# Patient Record
Sex: MALE | Race: WHITE | NOT HISPANIC OR LATINO | Employment: FULL TIME | URBAN - METROPOLITAN AREA
[De-identification: names, ages, dates, MRNs, and addresses within clinical notes are randomized per-mention and may not be internally consistent; named-entity substitution may affect disease eponyms.]

---

## 2018-05-08 ENCOUNTER — TRANSFERRED RECORDS (OUTPATIENT)
Dept: HEALTH INFORMATION MANAGEMENT | Facility: CLINIC | Age: 38
End: 2018-05-08

## 2018-05-10 ENCOUNTER — TRANSFERRED RECORDS (OUTPATIENT)
Dept: HEALTH INFORMATION MANAGEMENT | Facility: CLINIC | Age: 38
End: 2018-05-10

## 2018-10-18 ENCOUNTER — TRANSFERRED RECORDS (OUTPATIENT)
Dept: HEALTH INFORMATION MANAGEMENT | Facility: CLINIC | Age: 38
End: 2018-10-18

## 2019-01-24 ENCOUNTER — TRANSFERRED RECORDS (OUTPATIENT)
Dept: HEALTH INFORMATION MANAGEMENT | Facility: CLINIC | Age: 39
End: 2019-01-24

## 2019-06-19 ENCOUNTER — OFFICE VISIT (OUTPATIENT)
Dept: URGENT CARE | Facility: URGENT CARE | Age: 39
End: 2019-06-19
Payer: COMMERCIAL

## 2019-06-19 VITALS
RESPIRATION RATE: 17 BRPM | OXYGEN SATURATION: 97 % | HEART RATE: 104 BPM | BODY MASS INDEX: 26.06 KG/M2 | WEIGHT: 166 LBS | HEIGHT: 67 IN | SYSTOLIC BLOOD PRESSURE: 127 MMHG | DIASTOLIC BLOOD PRESSURE: 90 MMHG | TEMPERATURE: 98.9 F

## 2019-06-19 DIAGNOSIS — J06.9 VIRAL URI: Primary | ICD-10-CM

## 2019-06-19 DIAGNOSIS — J02.9 SORE THROAT: ICD-10-CM

## 2019-06-19 LAB
DEPRECATED S PYO AG THROAT QL EIA: NORMAL
SPECIMEN SOURCE: NORMAL

## 2019-06-19 PROCEDURE — 87880 STREP A ASSAY W/OPTIC: CPT | Performed by: PHYSICIAN ASSISTANT

## 2019-06-19 PROCEDURE — 99213 OFFICE O/P EST LOW 20 MIN: CPT | Performed by: PHYSICIAN ASSISTANT

## 2019-06-19 PROCEDURE — 87081 CULTURE SCREEN ONLY: CPT | Performed by: PHYSICIAN ASSISTANT

## 2019-06-19 ASSESSMENT — MIFFLIN-ST. JEOR: SCORE: 1631.72

## 2019-06-20 LAB
BACTERIA SPEC CULT: NORMAL
SPECIMEN SOURCE: NORMAL

## 2019-06-20 NOTE — PATIENT INSTRUCTIONS
Patient Education     Self-Care for Sore Throats    Sore throats happen for many reasons, such as colds, allergies, and infections caused by viruses or bacteria. In any case, your throat becomes red and sore. Your goal for self-care is to reduce your discomfort while giving your throat a chance to heal.  Moisten and soothe your throat  Tips include the following:    Try a sip of water first thing after waking up.    Keep your throat moist by drinking 6 or more glasses of clear liquids every day.    Run a cool-air humidifier in your room overnight.    Avoid cigarette smoke.     Suck on throat lozenges, cough drops, hard candy, ice chips, or frozen fruit-juice bars. Use the sugar-free versions if your diet or medical condition requires them.  Gargle to ease irritation  Gargling every hour or 2 can ease irritation. Try gargling with 1 of these solutions:    1/4 teaspoon of salt in 1/2 cup of warm water    An over-the-counter anesthetic gargle  Use medicine for more relief  Over-the-counter medicine can reduce sore throat symptoms. Ask your pharmacist if you have questions about which medicine to use:    Ease pain with anesthetic sprays. Aspirin or an aspirin substitute also helps. Remember, never give aspirin to anyone 18 or younger, or if you are already taking blood thinners.     For sore throats caused by allergies, try antihistamines to block the allergic reaction.    Remember: unless a sore throat is caused by a bacterial infection, antibiotics won t help you.  Prevent future sore throats  Prevention tips include the following:    Stop smoking or reduce contact with secondhand smoke. Smoke irritates the tender throat lining.    Limit contact with pets and with allergy-causing substances, such as pollen and mold.    When you re around someone with a sore throat or cold, wash your hands often to keep viruses or bacteria from spreading.    Don t strain your vocal cords.  Call your healthcare provider  Contact your  healthcare provider if you have:    A temperature over 101 F (38.3 C)    White spots on the throat    Great difficulty swallowing    Trouble breathing    A skin rash    Recent exposure to someone else with strep bacteria    Severe hoarseness and swollen glands in the neck or jaw   Date Last Reviewed: 8/1/2016 2000-2018 [employer]. [address] [place], [place] [id number]. All rights reserved. This information is not intended as a substitute for professional medical care. Always follow your healthcare professional's instructions.

## 2019-06-20 NOTE — PROGRESS NOTES
"Subjective     Alfredo Brewer is a 39 year old male who presents to clinic today for the following health issues:    HPI     Sore throat for 3 days. Staying the same. Painful to talk. Not drooling, swallowing well. No difficulty opening or closing his mouth. No fever. Mild body ache yesterday. Feels that the ears are full. He is getting more nasal drainage.          Review of Systems   ROS COMP: Constitutional, HEENT, cardiovascular, pulmonary, gi and gu systems are negative, except as otherwise noted.      Objective    /90   Pulse 104   Temp 98.9  F (37.2  C) (Oral)   Resp 17   Ht 1.71 m (5' 7.32\")   Wt 75.3 kg (166 lb)   SpO2 97%   BMI 25.75 kg/m    Body mass index is 25.75 kg/m .  Physical Exam   GENERAL: healthy, alert and no distress  HENT: normal cephalic/atraumatic, both ears: clear effusion, nose and mouth without ulcers or lesions, oropharynx clear, oral mucous membranes moist and tonsillar erythema  NECK: no adenopathy, no asymmetry, masses, or scars and thyroid normal to palpation  RESP: lungs clear to auscultation - no rales, rhonchi or wheezes  CV: regular rate and rhythm, normal S1 S2, no S3 or S4, no murmur, click or rub, no peripheral edema and peripheral pulses strong  MS: no gross musculoskeletal defects noted, no edema  SKIN: no suspicious lesions or rashes  NEURO: Normal strength and tone, mentation intact and speech normal    Diagnostic Test Results:  Results for orders placed or performed in visit on 06/19/19 (from the past 24 hour(s))   Strep, Rapid Screen   Result Value Ref Range    Specimen Description Throat     Rapid Strep A Screen       NEGATIVE: No Group A streptococcal antigen detected by immunoassay, await culture report.           Assessment & Plan     Alfredo was seen today for urgent care.    Diagnoses and all orders for this visit:    Viral URI    Sore throat  -     Strep, Rapid Screen  -     Beta strep group A culture    Home supportive care with warm saline gargle, " Tylenol/ibuprofen for pain as recommended dose without exceeding the recommended dose.  Mucinex for nasal congestion.  May use Flonase nasal spray once daily for nasal congestion.    I have discussed the patient's diagnosis and my plan of treatment with the patient. We went over any labs or imaging. Patient is aware to come back in with worsening symptoms or if no relief despite treatment plan.  Patient verbalizes understanding. All questions were addressed and answered.     Return in about 1 week (around 6/26/2019).    Moon Trimble PA-C  [facility]

## 2019-07-08 DIAGNOSIS — Z31.41 FERTILITY TESTING: Primary | ICD-10-CM

## 2019-07-12 DIAGNOSIS — Z31.41 FERTILITY TESTING: ICD-10-CM

## 2019-07-12 LAB
ABNORMAL SPERM: 97 MORPHOLOGY
ABSTINENCE DAYS: 5 DAYS (ref 2–7)
AGGLUTINATION: NO YES/NO
ANALYSIS TEMP - CENTIGRADE: 23.5 CENTIGRADE
CELL FRAGMENTS: ABNORMAL %
COLLECTION METHOD: ABNORMAL
COLLECTION SITE: ABNORMAL
CONSENT TO RELEASE TO PARTNER: YES
HEAD DEFECT: 97
IMMATURE SPERM: ABNORMAL %
IMMOTILE: 36 %
LAB RECEIPT TIME: ABNORMAL
LIQUEFIED: YES YES/NO
MIDPIECE DEFECT: 51
NON-PROGRESSIVE MOTILITY: 9 %
NORMAL SPERM: 3 % NORMAL FORMS (ref 4–?)
PROGRESSIVE MOTILITY: 55 % (ref 32–?)
ROUND CELLS: 0.4 MILLION/ML (ref ?–2)
SPECIMEN CONCENTRATION: 26 MILLION/ML (ref 15–?)
SPECIMEN PH: 8 PH (ref 7.2–?)
SPECIMEN TYPE: ABNORMAL
SPECIMEN VOL UR: 2.2 ML (ref 1.5–?)
TAIL DEFECT: 18
TIME OF ANALYSIS: ABNORMAL
TOTAL NUMBER: 57 MILLION (ref 39–?)
TOTAL PROGRESSIVE MOTILE: 31 MILLION (ref 15.6–?)
VISCOUS: NO YES/NO
VITALITY: ABNORMAL % (ref 58–?)
WBC SPECIMEN: ABNORMAL %

## 2019-07-12 PROCEDURE — 89322 SEMEN ANAL STRICT CRITERIA: CPT

## 2019-09-29 ENCOUNTER — HEALTH MAINTENANCE LETTER (OUTPATIENT)
Age: 39
End: 2019-09-29

## 2021-01-14 ENCOUNTER — HEALTH MAINTENANCE LETTER (OUTPATIENT)
Age: 41
End: 2021-01-14

## 2021-01-28 ENCOUNTER — TRANSFERRED RECORDS (OUTPATIENT)
Dept: HEALTH INFORMATION MANAGEMENT | Facility: CLINIC | Age: 41
End: 2021-01-28

## 2021-03-30 ENCOUNTER — TRANSCRIBE ORDERS (OUTPATIENT)
Dept: OTHER | Age: 41
End: 2021-03-30

## 2021-03-30 DIAGNOSIS — C92.00 ACUTE MYELOID LEUKEMIA NOT HAVING ACHIEVED REMISSION (H): Primary | ICD-10-CM

## 2021-04-06 ENCOUNTER — DOCUMENTATION ONLY (OUTPATIENT)
Dept: ONCOLOGY | Facility: CLINIC | Age: 41
End: 2021-04-06

## 2021-04-06 NOTE — PROGRESS NOTES
Action    Action Taken 4/6/2021 10:34AM   I called pt Alfredo - his records at [facility] are from the 1980s. Alfredo is going to work on signing an BELEN for his historical records. I sent an BELEN to his email: [email or web address]    4/8/2021 11:32AM   I called pt Alfredo to follow up on his BELEN - he is working on the BELEN right now.     I called the [facility] Phone: [phone]    Release of Info  Ph: [phone]  Fax: [phone]    4/14/2021 8:14AM   I called the [facility] to follow up on the fax requests. I left a detailed vm requesting a call back.     I also faxed over a second urgent request along with the BELEN Alfredo signed.     4/14/2021 10:24AM   I called [facility] again - they are waiting on the pt's paper chart. I asked them to fax over his recent records and they will do that soon.     4/15/2021 3:02pm   I called [facility] again - They have two different individuals named Alfredo in their system and the records are mixed together. They are trying to sort through the records to make sure they are sending us the correct info.     9:19AM  I checked in with nurse Marita- she got some records from a nurse at [facility].    I called [facility] again - they are having system issues and still have to sort through the records. They are aware of the request for records and are trying to send them over soon.

## 2021-04-14 ENCOUNTER — TRANSFERRED RECORDS (OUTPATIENT)
Dept: HEALTH INFORMATION MANAGEMENT | Facility: CLINIC | Age: 41
End: 2021-04-14

## 2021-05-07 ENCOUNTER — TELEPHONE (OUTPATIENT)
Dept: OTOLARYNGOLOGY | Facility: CLINIC | Age: 41
End: 2021-05-07

## 2021-05-07 ENCOUNTER — VIRTUAL VISIT (OUTPATIENT)
Dept: ONCOLOGY | Facility: CLINIC | Age: 41
End: 2021-05-07
Attending: PEDIATRICS
Payer: COMMERCIAL

## 2021-05-07 DIAGNOSIS — Z86.39 HISTORY OF HYPERLIPIDEMIA: ICD-10-CM

## 2021-05-07 DIAGNOSIS — T66.XXXS RADIATION INJURY OF BRAIN, SEQUELA: ICD-10-CM

## 2021-05-07 DIAGNOSIS — G62.0 DRUG-INDUCED POLYNEUROPATHY (H): ICD-10-CM

## 2021-05-07 DIAGNOSIS — G43.109 MIGRAINE WITH TYPICAL AURA: Primary | ICD-10-CM

## 2021-05-07 DIAGNOSIS — Z92.21 STATUS POST CHEMOTHERAPY: ICD-10-CM

## 2021-05-07 DIAGNOSIS — Z85.6 HISTORY OF MYELOID LEUKEMIA: ICD-10-CM

## 2021-05-07 DIAGNOSIS — E78.5 HYPERLIPIDEMIA, UNSPECIFIED HYPERLIPIDEMIA TYPE: ICD-10-CM

## 2021-05-07 DIAGNOSIS — Z92.3 HISTORY OF RADIATION TO HEAD AND NECK REGION: ICD-10-CM

## 2021-05-07 DIAGNOSIS — C92.01 AML (ACUTE MYELOID LEUKEMIA) IN REMISSION (H): ICD-10-CM

## 2021-05-07 DIAGNOSIS — R42 VERTIGO: ICD-10-CM

## 2021-05-07 DIAGNOSIS — Z86.79 HISTORY OF CARDIAC DISORDER: ICD-10-CM

## 2021-05-07 DIAGNOSIS — Z85.828 HISTORY OF BASAL CELL CARCINOMA: ICD-10-CM

## 2021-05-07 PROBLEM — R29.898 TMJ CLICK: Status: ACTIVE | Noted: 2021-05-07

## 2021-05-07 PROBLEM — R32 URINARY INCONTINENCE: Status: ACTIVE | Noted: 2021-05-07

## 2021-05-07 PROBLEM — Z98.890 H/O BASAL CELL CARCINOMA EXCISION: Status: ACTIVE | Noted: 2018-08-25

## 2021-05-07 PROCEDURE — 99205 OFFICE O/P NEW HI 60 MIN: CPT | Mod: GT | Performed by: PEDIATRICS

## 2021-05-07 PROCEDURE — 99417 PROLNG OP E/M EACH 15 MIN: CPT | Mod: GT | Performed by: PEDIATRICS

## 2021-05-07 PROCEDURE — 999N001193 HC VIDEO/TELEPHONE VISIT; NO CHARGE

## 2021-05-07 RX ORDER — ROSUVASTATIN CALCIUM 20 MG/1
20 TABLET, COATED ORAL
COMMUNITY
Start: 2020-05-22 | End: 2022-05-20

## 2021-05-07 NOTE — LETTER
"  5/7/2021      RE: Alfredo Brewer  [id number] [facility] [address]       Dear Dr. Purcell,    Below is a copy of my most recent visit note with Alfredo in our childhood Cancer Survivor Program (cCSP).  His Survivor Care Plan (SCP) is included below. Please let us know if there is anything that we can do to help and thank you for collaborating with us in his survivorship care.    Arti Pimentel MD, MPH, MSE  Director, Cancer Survivor Program  Pediatric Hematology/Oncology  [facility]    Childhood Cancer Survivor Program (cCS) Progress Note  Pediatric Oncology     Alfredo is a 41 year old who is being evaluated via a billable video visit.      How would you like to obtain your AVS? MyChart     If the video visit is dropped, the invitation should be resent by: Send to e-mail at: [email or web address]     Will anyone else be joining your video visit? No          Vitals - Patient Reported  Weight (Patient Reported): 76.7 kg (169 lb)  Height (Patient Reported): 171.5 cm (5' 7.5\")  BMI (Based on Pt Reported Ht/Wt): 26.08  Pain Score: No Pain (0)    Tai Sanford LPN    Video Start Time: 7:15  End Time: 8:05am    Video-Visit Details    Type of service:  Video Visit    Video End Time:8:59 AM    Originating Location (pt. Location): Home    Distant Location (provider location):  [facility]     Platform used for Video Visit: [email or web address]     Pediatric Hematology/Oncology Progress Note    Alfredo Brewer is a 41 year old male referred to establish care in our childhood Cancer Survivor Program (cCSP)      Review of systems: A complete and comprehensive review of systems was performed and was negative other than what is listed above in the HPI.    PMHx: Appendicitis (2006), Herniated cervical disc (C5/C6), Degenerative disc (C6/C7), Chronic pain, Eczema.  SurgHx: Appendectomy (12/11/2006) and Endoscopy with biopsy (12/26/2013)  FamHx: non-contributory "   SocialHx: works full time,  and interested in starting a family. More to be detailed in separate documentation by [facility] Senthil Bush.    Current Outpatient Medications   Medication     AMITRIPTYLINE HCL PO     meclizine (ANTIVERT) 25 MG tablet     Omeprazole (PRILOSEC PO)     rosuvastatin (CRESTOR) 20 MG tablet     No current facility-administered medications for this visit.        Physical Exam:   GENERAL: Healthy, alert and no distress  EYES: Eyes grossly normal to inspection.  No discharge or erythema, or obvious scleral/conjunctival abnormalities.  HENT: Normal cephalic/atraumatic.  External ears, nose and mouth without ulcers or lesions.  No nasal drainage visible.  NECK: No asymmetry, visible masses or scars  RESP: No audible wheeze, cough, or visible cyanosis.  No visible retractions or increased work of breathing.    CV: no peripheral edema  MS: No gross musculoskeletal defects noted.  Normal range of motion.  No visible edema.  SKIN: Visible skin clear. No significant rash, abnormal pigmentation or lesions.  NEURO: Cranial nerves grossly intact.  Mentation and speech appropriate for age.  PSYCH: Mentation appears normal, affect normal/bright, judgement and insight intact, normal speech and appearance well-groomed.    Labs: none    SURVIVOR CARE PLAN  Treatment History  Diagnosis: Acute Myeloid Leukemia (AML)  Date of Diagnosis: 8/28/80  Date Therapy Completed: 3/2/1983  Treatment:  1) Chemo: Doxorubicin (500mg/m2), Cyclophosphamide (4.7g/m2), 5-Azacytidine, Methotrexate, Carmustine (BCNU), 6-Mercaptopurine, 6-Thioguanine, Vincristine and Cytarabine.  2) Radiation: Whole Brain (1800 cGy, initiated on 1980)  3) Surgery: Skin and testicular biopsy (1980), testicular biopsy (3/2/1983)  4) Bone Marrow Transplant: None  5) Immunotherapy: None  Known Late-Effects  1. Urinary Incontinence (2007)  2. Chronic Pelvic Pain (2007)  3. Migraine Headaches  4. Vertigo  5. Heart Issues - now all  better/resolved (left ventricular dysfunction)  6. Hypertension (high blood pressure)  7. Hyperlipidemia (high cholesterol)  8. Basal Cell Carcinoma (head)  9. Pre cancerous skin lesions (multiple on/and around the head/scalp and R upper anterior shoulder)   New Late-Effects  1. None  Surveillance Plan  1. Second Cancers: There was a risk for second cancers of the blood such as AML or MDS due to chemo drugs such as Cyclophosphamide and Doxorubicin.  However, this risk was highest in the first 5-10 years after the exposure thus are no longer a concern.  In addition, close attention will be paid to this during our yearly history and physical for any concerning signs or symptoms (such as unexplained fevers, bleeding or bruising, extreme fatigue or enlarged glands/swollen lymph nodes). There is a risk of second cancers to the skin, soft tissues and bones within the field of prior radiation.  Thus any new skin findings in the field of radiation should be reported to your dermatologist immediately and any new and unexplained lumps or bumps in the field of radiation should be brought to the attention of a medical provider immediately. Close attention to sun protection is also strongly recommended (so please continue your regular follow-up with dermatology yearly). Any sudden or dramatic change in vision, hearing, taste or smell should be reported immediately to a health care provider who may evaluate for a second cancer of the brain.  This includes unexplained or persistently worsening of your headaches as well as any neurological change.  Awareness of the risk of thyroid cancer is also important in case the whole brain radiation scattered and effected your thyroid which is in your neck.    2. Liver, Kidney and Bladder Health: This risk is low but a possibility after 6-mercaptopurine exposure.  This will be evaluated with baseline labs (hepatic panel) in 2021.  If normal, these lab tests will only be needed in the future  on an as needed basis if you develop symptoms or signs of liver dysfunction. Due to the exposure to chemotherapy drugs that can affect the kidney and bladder, it will be necessary to obtain [facility] kidney health lab (BUN/Cr) at entry into the [facility] but thereafter, if normal, needs to be only sent on an as needed basis.  3. Heart Health: As a result of your exposure to a chemotherapy class of drugs called anthracyclines (such as Doxorubicin) it will be necessary to obtain echos every 2 years. These can be done on the same day as routine appointments with us in the cCSP. This was last done in 2013.  If you notice any new, changed or severe chest pain, shortness of breath or unexplained swelling, please notify a medical provider immediately. Encouraged regular physical activity and discussed the importance of maintaining a healthy weight/BMI and avoiding unhealthy lifestyle choices, such as smoking or excessive alcohol consumption.  4. Bone Health: The exposure to chemotherapy as a child (particularly methotrexate) results in a risk for long-term poor bone health such as decreased bone mineral density. This will need to be assessed with a baseline DEXA scan in the future which then can be repeated only on an as needed basis. We will check a yearly Vitamin D level as well.  5. Hormonal Health: As a result of the exposure to brain radiation and chemo such as cycophosphamide, there is a risk for hormonal abnormalities.  These will be screened for during our yearly cCSP visit with a thorough history and physical. In addition, yearly thyroid function tests should be ordered as well.  6. Mental Health: Many childhood cancer survivors are at risk for neurocognitive deficits as a result of the disruption to their early development for their necessary oncology treatments.  If any changes are noted regarding neurocognitive function (memory issues, information processing, comprehension, etc) or psychological health please let us  and your primary care provider know immediately.  7. Dental Health: As a result of the radiation and chemo exposure, there is risk for increased cavities.  Therefore, it is critical to have routine preventative dental care every 6 months.    8. Eye Health: As a result of the radiation to the brain, there is risk for scatter to the eyes leading to eye late-effects such as cataracts and vision changes.  Therefore, it is critical to have eyes examined yearly by an eye specialist who is aware of the radiation history.  9. Metabolic Health: There is increasing evidence in the childhood cancer survivor literature to suggest that survivors are at increased risk for things related to metabolic health such as obesity, high cholesterol, hormonal imbalance, high BP, poor renal function, etc.  Thus, a lipid panel for cholesterol and triglyceride screening should take place every 1-2 years and if any abnormality is found requiring intervention, we would recommend that the intervention occur immediately and aggressively.  10. Vaccine Health: Please confirm with your PCP that your vaccines are up to date, including HPV. You can now receive any vaccine that he/she recommends, there are no restrictions.  11. Hearing Health: As a result of brain radiation that could scatter to the inner ear, there is a risk for further hearing loss.   Therefore, it is critical to have your hearing checked if you notice a change.      Assessment and Plan:  42 yo M w/ h/o AML treated with chemo and whole brain radiation doing well with stable late -effects that need updated evaluations and treatments    PLAN:  1) CBC as per # 1 above - ordered as future lab to be done locally through lab-only visit  2) CMP as per #2 above - ordered as future lab to be done locally through lab-only visit  3) Echo as per #3 above - ordered as future test to be done locally  4) Lipid panel (cholesterol) and HgA1c (blood sugar) as per # 9 above - ordered as future lab to  be done locally through lab-only visit  5) TSH and free T4 as per # 5 above - ordered as future lab to be done locally through lab-only visit  6) Vit D level as per #4 above - ordered as future lab to be done locally through lab-only visit  7) ENT and PT (balance/vesitublar group) referrals for vertigo  8) Neurology referral for updated evaluation and treatment of migraines and input on how frequently he needs brain MRIs and thoughts on peripheral neuropathy evaluation and treatment  9) PM&R - Cancer Rehab referral with Dr. 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      Total time spent on the following services on the date of the encounter:  Preparing to see patient, chart review, review of outside records, Ordering medications, test, procedures, chemotherapy, Referring or communicating with other healthcare professionals, Performing a medically appropriate examination , Counseling and educating the patient/family/caregiver , Documenting clinical information in the electronic or other health record , Communicating results to the patient/family/caregiver , Care coordination  and Total time spent: 110 mins          Arti Pimentel MD

## 2021-05-07 NOTE — PROGRESS NOTES
"Alfredo is a 41 year old who is being evaluated via a billable video visit.      How would you like to obtain your AVS? MyChart     If the video visit is dropped, the invitation should be resent by: Send to e-mail at: [email or web address]     Will anyone else be joining your video visit? No          Vitals - Patient Reported  Weight (Patient Reported): 76.7 kg (169 lb)  Height (Patient Reported): 171.5 cm (5' 7.5\")  BMI (Based on Pt Reported Ht/Wt): 26.08  Pain Score: No Pain (0)    Tai Roman Reid LPN    Video Start Time: 7:15  End Time: 8:05am    Video-Visit Details    Type of service:  Video Visit    Video End Time:8:59 AM    Originating Location (pt. Location): Home    Distant Location (provider location):  [facility] CANCER [facility]     Platform used for Video Visit: LiveQoS     Pediatric Hematology/Oncology Progress Note    Alfredo Brewer is a 41 year old male referred to establish care in our childhood Cancer Survivor Program (cCSP)      Review of systems: A complete and comprehensive review of systems was performed and was negative other than what is listed above in the HPI.    PMHx: Appendicitis (2006), Herniated cervical disc (C5/C6), Degenerative disc (C6/C7), Chronic pain, Eczema.  SurgHx: Appendectomy (12/11/2006) and Endoscopy with biopsy (12/26/2013)  FamHx: non-contributory   SocialHx: works full time,  and interested in starting a family. More to be detailed in separate documentation by cCSP Senthil Bush.    Current Outpatient Medications   Medication     AMITRIPTYLINE HCL PO     meclizine (ANTIVERT) 25 MG tablet     Omeprazole (PRILOSEC PO)     rosuvastatin (CRESTOR) 20 MG tablet     No current facility-administered medications for this visit.        Physical Exam:   GENERAL: Healthy, alert and no distress  EYES: Eyes grossly normal to inspection.  No discharge or erythema, or obvious scleral/conjunctival abnormalities.  HENT: Normal cephalic/atraumatic.  External ears, nose " and mouth without ulcers or lesions.  No nasal drainage visible.  NECK: No asymmetry, visible masses or scars  RESP: No audible wheeze, cough, or visible cyanosis.  No visible retractions or increased work of breathing.    CV: no peripheral edema  MS: No gross musculoskeletal defects noted.  Normal range of motion.  No visible edema.  SKIN: Visible skin clear. No significant rash, abnormal pigmentation or lesions.  NEURO: Cranial nerves grossly intact.  Mentation and speech appropriate for age.  PSYCH: Mentation appears normal, affect normal/bright, judgement and insight intact, normal speech and appearance well-groomed.    Labs: none    SURVIVOR CARE PLAN  Treatment History  Diagnosis: Acute Myeloid Leukemia (AML)  Date of Diagnosis: 8/28/80  Date Therapy Completed: 3/2/1983  Treatment:  1) Chemo: Doxorubicin (500mg/m2), Cyclophosphamide (4.7g/m2), 5-Azacytidine, Methotrexate, Carmustine (BCNU), 6-Mercaptopurine, 6-Thioguanine, Vincristine and Cytarabine.  2) Radiation: Whole Brain (1800 cGy, initiated on 1980)  3) Surgery: Skin and testicular biopsy (1980), testicular biopsy (3/2/1983)  4) Bone Marrow Transplant: None  5) Immunotherapy: None  Known Late-Effects  1. Urinary Incontinence (2007)  2. Chronic Pelvic Pain (2007)  3. Migraine Headaches  4. Vertigo  5. Heart Issues - now all better/resolved (left ventricular dysfunction)  6. Hypertension (high blood pressure)  7. Hyperlipidemia (high cholesterol)  8. Basal Cell Carcinoma (head)  9. Pre cancerous skin lesions (multiple on/and around the head/scalp and R upper anterior shoulder)   New Late-Effects  1. None  Surveillance Plan  1. Second Cancers: There was a risk for second cancers of the blood such as AML or MDS due to chemo drugs such as Cyclophosphamide and Doxorubicin.  However, this risk was highest in the first 5-10 years after the exposure thus are no longer a concern.  In addition, close attention will be paid to this during our yearly  history and physical for any concerning signs or symptoms (such as unexplained fevers, bleeding or bruising, extreme fatigue or enlarged glands/swollen lymph nodes). There is a risk of second cancers to the skin, soft tissues and bones within the field of prior radiation.  Thus any new skin findings in the field of radiation should be reported to your dermatologist immediately and any new and unexplained lumps or bumps in the field of radiation should be brought to the attention of a medical provider immediately. Close attention to sun protection is also strongly recommended (so please continue your regular follow-up with dermatology yearly). Any sudden or dramatic change in vision, hearing, taste or smell should be reported immediately to a health care provider who may evaluate for a second cancer of the brain.  This includes unexplained or persistently worsening of your headaches as well as any neurological change.  Awareness of the risk of thyroid cancer is also important in case the whole brain radiation scattered and effected your thyroid which is in your neck.    2. Liver, Kidney and Bladder Health: This risk is low but a possibility after 6-mercaptopurine exposure.  This will be evaluated with baseline labs (hepatic panel) in 2021.  If normal, these lab tests will only be needed in the future on an as needed basis if you develop symptoms or signs of liver dysfunction. Due to the exposure to chemotherapy drugs that can affect the kidney and bladder, it will be necessary to obtain [facility] kidney health lab (BUN/Cr) at entry into the [facility] but thereafter, if normal, needs to be only sent on an as needed basis.  3. Heart Health: As a result of your exposure to a chemotherapy class of drugs called anthracyclines (such as Doxorubicin) it will be necessary to obtain echos every 2 years. These can be done on the same day as routine appointments with us in the [facility]. This was last done in 2013.  If you notice any new,  changed or severe chest pain, shortness of breath or unexplained swelling, please notify a medical provider immediately. Encouraged regular physical activity and discussed the importance of maintaining a healthy weight/BMI and avoiding unhealthy lifestyle choices, such as smoking or excessive alcohol consumption.  4. Bone Health: The exposure to chemotherapy as a child (particularly methotrexate) results in a risk for long-term poor bone health such as decreased bone mineral density. This will need to be assessed with a baseline DEXA scan in the future which then can be repeated only on an as needed basis. We will check a yearly Vitamin D level as well.  5. Hormonal Health: As a result of the exposure to brain radiation and chemo such as cycophosphamide, there is a risk for hormonal abnormalities.  These will be screened for during our yearly cCSP visit with a thorough history and physical. In addition, yearly thyroid function tests should be ordered as well.  6. Mental Health: Many childhood cancer survivors are at risk for neurocognitive deficits as a result of the disruption to their early development for their necessary oncology treatments.  If any changes are noted regarding neurocognitive function (memory issues, information processing, comprehension, etc) or psychological health please let us and your primary care provider know immediately.  7. Dental Health: As a result of the radiation and chemo exposure, there is risk for increased cavities.  Therefore, it is critical to have routine preventative dental care every 6 months.    8. Eye Health: As a result of the radiation to the brain, there is risk for scatter to the eyes leading to eye late-effects such as cataracts and vision changes.  Therefore, it is critical to have eyes examined yearly by an eye specialist who is aware of the radiation history.  9. Metabolic Health: There is increasing evidence in the childhood cancer survivor literature to suggest  that survivors are at increased risk for things related to metabolic health such as obesity, high cholesterol, hormonal imbalance, high BP, poor renal function, etc.  Thus, a lipid panel for cholesterol and triglyceride screening should take place every 1-2 years and if any abnormality is found requiring intervention, we would recommend that the intervention occur immediately and aggressively.  10. Vaccine Health: Please confirm with your PCP that your vaccines are up to date, including HPV. You can now receive any vaccine that he/she recommends, there are no restrictions.  11. Hearing Health: As a result of brain radiation that could scatter to the inner ear, there is a risk for further hearing loss.   Therefore, it is critical to have your hearing checked if you notice a change.      Assessment and Plan:  42 yo M w/ h/o AML treated with chemo and whole brain radiation doing well with stable late -effects that need updated evaluations and treatments    PLAN:  1) CBC as per # 1 above - ordered as future lab to be done locally through lab-only visit  2) CMP as per #2 above - ordered as future lab to be done locally through lab-only visit  3) Echo as per #3 above - ordered as future test to be done locally  4) Lipid panel (cholesterol) and HgA1c (blood sugar) as per # 9 above - ordered as future lab to be done locally through lab-only visit  5) TSH and free T4 as per # 5 above - ordered as future lab to be done locally through lab-only visit  6) Vit D level as per #4 above - ordered as future lab to be done locally through lab-only visit  7) ENT and PT (balance/vesitublar group) referrals for vertigo  8) Neurology referral for updated evaluation and treatment of migraines and input on how frequently he needs brain MRIs and thoughts on peripheral neuropathy evaluation and treatment  9) PM&R - Cancer Rehab referral with Dr. 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    Participation in the childhood Cancer Survivor Program database (IRB study # [id number] - Late Effects in Survivors of Cancer, Stem Cell Transplantation, or Blood Disorders) was reviewed with the participant and all risks and benefits were explained. I answered all questions and reviewed all information as listed on our written consent document. Participant is happy to consent and has no further questions.      Total time spent on the following services on the date of the encounter:  Preparing to see patient, chart review, review of outside records, Ordering medications, test, procedures, chemotherapy, Referring or communicating with other healthcare professionals, Performing a medically appropriate examination , Counseling and educating the patient/family/caregiver , Documenting clinical information in the electronic or other health record , Communicating results to the patient/family/caregiver , Care coordination  and Total time spent: 110 mins

## 2021-05-07 NOTE — TELEPHONE ENCOUNTER
[facility] Call Center    Phone Message    May a detailed message be left on voicemail: no     Reason for Call: Appointment Intake    Referring Provider Name: Arti Pimentel MD in  [facility] LONG TERM F/U  Diagnosis and/or Symptoms:   Migraine with typical aura [G43.109]  History of myeloid leukemia [Z85.6]  Hyperlipidemia, unspecified hyperlipidemia type [E78.5]  AML (acute myeloid leukemia) in remission (H) [C92.01]  Status post chemotherapy [Z92.21]  History of radiation to head and neck region [Z92.3]  Drug-induced polyneuropathy (H) [G62.0]  History of cardiac disorder [Z86.79]  History of basal cell carcinoma [Z85.828]  History of hyperlipidemia [Z86.39]  Vertigo [R42]  Radiation injury of brain, 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

## 2021-05-07 NOTE — LETTER
5/7/2021      RE: Alfredo Brewer  [id number] [address] [id number]     SURVIVOR CARE PLAN  Treatment History  Diagnosis: Acute Myeloid Leukemia (AML)  Date of Diagnosis: 8/28/80  Date Therapy Completed: 3/2/1983  Treatment:  1) Chemo: Doxorubicin (500mg/m2), Cyclophosphamide (4.7g/m2), 5-Azacytidine, Methotrexate, Carmustine (BCNU), 6-Mercaptopurine, 6-Thioguanine, Vincristine and Cytarabine.  2) Radiation: Whole Brain (1800 cGy, initiated on 1980)  3) Surgery: Skin and testicular biopsy (1980), testicular biopsy (3/2/1983)  4) Bone Marrow Transplant: None  5) Immunotherapy: None  Known Late-Effects  1. Urinary Incontinence (2007)  2. Chronic Pelvic Pain (2007)  3. Migraine Headaches  4. Vertigo  5. Heart Issues - now all better/resolved (left ventricular dysfunction)  6. Hypertension (high blood pressure)  7. Hyperlipidemia (high cholesterol)  8. Basal Cell Carcinoma (head)  9. Pre cancerous skin lesions (multiple on/and around the head/scalp and R upper anterior shoulder)   New Late-Effects  1. None  Surveillance Plan  1. Second Cancers: There was a risk for second cancers of the blood such as AML or MDS due to chemo drugs such as Cyclophosphamide and Doxorubicin.  However, this risk was highest in the first 5-10 years after the exposure thus are no longer a concern.  In addition, close attention will be paid to this during our yearly history and physical for any concerning signs or symptoms (such as unexplained fevers, bleeding or bruising, extreme fatigue or enlarged glands/swollen lymph nodes). There is a risk of second cancers to the skin, soft tissues and bones within the field of prior radiation.  Thus any new skin findings in the field of radiation should be reported to your dermatologist immediately and any new and unexplained lumps or bumps in the field of radiation should be brought to the attention of a medical provider immediately. Close attention to sun protection is also strongly  recommended (so please continue your regular follow-up with dermatology yearly). Any sudden or dramatic change in vision, hearing, taste or smell should be reported immediately to a health care provider who may evaluate for a second cancer of the brain.  This includes unexplained or persistently worsening of your headaches as well as any neurological change.  Awareness of the risk of thyroid cancer is also important in case the whole brain radiation scattered and effected your thyroid which is in your neck.    2. Liver, Kidney and Bladder Health: This risk is low but a possibility after 6-mercaptopurine exposure.  This will be evaluated with baseline labs (hepatic panel) in 2021.  If normal, these lab tests will only be needed in the future on an as needed basis if you develop symptoms or signs of liver dysfunction. Due to the exposure to chemotherapy drugs that can affect the kidney and bladder, it will be necessary to obtain [facility] kidney health lab (BUN/Cr) at entry into the [facility] but thereafter, if normal, needs to be only sent on an as needed basis.  3. Heart Health: As a result of your exposure to a chemotherapy class of drugs called anthracyclines (such as Doxorubicin) it will be necessary to obtain echos every 2 years. These can be done on the same day as routine appointments with us in the [facility]. This was last done in 2013.  If you notice any new, changed or severe chest pain, shortness of breath or unexplained swelling, please notify a medical provider immediately. Encouraged regular physical activity and discussed the importance of maintaining a healthy weight/BMI and avoiding unhealthy lifestyle choices, such as smoking or excessive alcohol consumption.  4. Bone Health: The exposure to chemotherapy as a child (particularly methotrexate) results in a risk for long-term poor bone health such as decreased bone mineral density. This will need to be assessed with a baseline DEXA scan in the future which then  can be repeated only on an as needed basis. We will check a yearly Vitamin D level as well.  5. Hormonal Health: As a result of the exposure to brain radiation and chemo such as cycophosphamide, there is a risk for hormonal abnormalities.  These will be screened for during our yearly cCSP visit with a thorough history and physical. In addition, yearly thyroid function tests should be ordered as well.  6. Mental Health: Many childhood cancer survivors are at risk for neurocognitive deficits as a result of the disruption to their early development for their necessary oncology treatments.  If any changes are noted regarding neurocognitive function (memory issues, information processing, comprehension, etc) or psychological health please let us and your primary care provider know immediately.  7. Dental Health: As a result of the radiation and chemo exposure, there is risk for increased cavities.  Therefore, it is critical to have routine preventative dental care every 6 months.    8. Eye Health: As a result of the radiation to the brain, there is risk for scatter to the eyes leading to eye late-effects such as cataracts and vision changes.  Therefore, it is critical to have eyes examined yearly by an eye specialist who is aware of the radiation history.  9. Metabolic Health: There is increasing evidence in the childhood cancer survivor literature to suggest that survivors are at increased risk for things related to metabolic health such as obesity, high cholesterol, hormonal imbalance, high BP, poor renal function, etc.  Thus, a lipid panel for cholesterol and triglyceride screening should take place every 1-2 years and if any abnormality is found requiring intervention, we would recommend that the intervention occur immediately and aggressively.  10. Vaccine Health: Please confirm with your PCP that your vaccines are up to date, including HPV. You can now receive any vaccine that he/she recommends, there are no  restrictions.  11. Hearing Health: As a result of brain radiation that could scatter to the inner ear, there is a risk for further hearing loss.   Therefore, it is critical to have your hearing checked if you notice a change.    PLAN:  1) CBC as per # 1 above - ordered as future lab to be done locally through lab-only visit  2) CMP as per #2 above - ordered as future lab to be done locally through lab-only visit  3) Echo as per #3 above - ordered as future test to be done locally  4) Lipid panel (cholesterol) and HgA1c (blood sugar) as per # 9 above - ordered as future lab to be done locally through lab-only visit  5) TSH and free T4 as per # 5 above - ordered as future lab to be done locally through lab-only visit  6) Vit D level as per #4 above - ordered as future lab to be done locally through lab-only visit  7) ENT and PT (balance/vesitublar group) referrals for vertigo  8) Neurology referral for updated evaluation and treatment of migraines and input on how frequently he needs brain MRIs and thoughts on peripheral neuropathy evaluation and treatment  9) PM&R - Cancer Rehab referral with Dr. 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

## 2021-05-07 NOTE — LETTER
"5/7/2021      RE: Alfredo Brewer  [address] [facility] [address]       Alfredo is a 41 year old who is being evaluated via a billable video visit.      How would you like to obtain your AVS? MyChart     If the video visit is dropped, the invitation should be resent by: Send to e-mail at: [email or web address]     Will anyone else be joining your video visit? No          Vitals - Patient Reported  Weight (Patient Reported): 76.7 kg (169 lb)  Height (Patient Reported): 171.5 cm (5' 7.5\")  BMI (Based on Pt Reported Ht/Wt): 26.08  Pain Score: No Pain (0)    Tai Sanford LPN    Video Start Time: 7:15  End Time: 8:05am    Video-Visit Details    Type of service:  Video Visit    Video End Time:8:59 AM    Originating Location (pt. Location): Home    Distant Location (provider location):  [facility]     Platform used for Video Visit: [facility]     Pediatric Hematology/Oncology Progress Note    Alfredo Brewer is a 41 year old male referred to establish care in our childhood Cancer Survivor Program (cCSP)      Review of systems: A complete and comprehensive review of systems was performed and was negative other than what is listed above in the HPI.    PMHx: Appendicitis (2006), Herniated cervical disc (C5/C6), Degenerative disc (C6/C7), Chronic pain, Eczema.  SurgHx: Appendectomy (12/11/2006) and Endoscopy with biopsy (12/26/2013)  FamHx: non-contributory   SocialHx: works full time,  and interested in starting a family. More to be detailed in separate documentation by cCSP Senthil Bush.    Current Outpatient Medications   Medication     AMITRIPTYLINE HCL PO     meclizine (ANTIVERT) 25 MG tablet     Omeprazole (PRILOSEC PO)     rosuvastatin (CRESTOR) 20 MG tablet     No current facility-administered medications for this visit.        Physical Exam:   GENERAL: Healthy, alert and no distress  EYES: Eyes grossly normal to inspection.  No discharge or erythema, or obvious " scleral/conjunctival abnormalities.  HENT: Normal cephalic/atraumatic.  External ears, nose and mouth without ulcers or lesions.  No nasal drainage visible.  NECK: No asymmetry, visible masses or scars  RESP: No audible wheeze, cough, or visible cyanosis.  No visible retractions or increased work of breathing.    CV: no peripheral edema  MS: No gross musculoskeletal defects noted.  Normal range of motion.  No visible edema.  SKIN: Visible skin clear. No significant rash, abnormal pigmentation or lesions.  NEURO: Cranial nerves grossly intact.  Mentation and speech appropriate for age.  PSYCH: Mentation appears normal, affect normal/bright, judgement and insight intact, normal speech and appearance well-groomed.    Labs: none    SURVIVOR CARE PLAN  Treatment History  Diagnosis: Acute Myeloid Leukemia (AML)  Date of Diagnosis: [birth year]/28[birth year]  Date Therapy Completed: 3/2/1983  Treatment:  1) Chemo: Doxorubicin (500mg/m2), Cyclophosphamide (4.7g/m2), 5-Azacytidine, Methotrexate, Carmustine (BCNU), 6-Mercaptopurine, 6-Thioguanine, Vincristine and Cytarabine.  2) Radiation: Whole Brain (1800 cGy, initiated on 1980)  3) Surgery: Skin and testicular biopsy (1980), testicular biopsy (3/2/1983)  4) Bone Marrow Transplant: None  5) Immunotherapy: None  Known Late-Effects  1. Urinary Incontinence (2007)  2. Chronic Pelvic Pain (2007)  3. Migraine Headaches  4. Vertigo  5. Heart Issues - now all better/resolved (left ventricular dysfunction)  6. Hypertension (high blood pressure)  7. Hyperlipidemia (high cholesterol)  8. Basal Cell Carcinoma (head)  9. Pre cancerous skin lesions (multiple on/and around the head/scalp and R upper anterior shoulder)   New Late-Effects  1. None  Surveillance Plan  1. Second Cancers: There was a risk for second cancers of the blood such as AML or MDS due to chemo drugs such as Cyclophosphamide and Doxorubicin.  However, this risk was highest in the first 5-10 years after the exposure thus are no  longer a concern.  In addition, close attention will be paid to this during our yearly history and physical for any concerning signs or symptoms (such as unexplained fevers, bleeding or bruising, extreme fatigue or enlarged glands/swollen lymph nodes). There is a risk of second cancers to the skin, soft tissues and bones within the field of prior radiation.  Thus any new skin findings in the field of radiation should be reported to your dermatologist immediately and any new and unexplained lumps or bumps in the field of radiation should be brought to the attention of a medical provider immediately. Close attention to sun protection is also strongly recommended (so please continue your regular follow-up with dermatology yearly). Any sudden or dramatic change in vision, hearing, taste or smell should be reported immediately to a health care provider who may evaluate for a second cancer of the brain.  This includes unexplained or persistently worsening of your headaches as well as any neurological change.  Awareness of the risk of thyroid cancer is also important in case the whole brain radiation scattered and effected your thyroid which is in your neck.    2. Liver, Kidney and Bladder Health: This risk is low but a possibility after 6-mercaptopurine exposure.  This will be evaluated with baseline labs (hepatic panel) in 2021.  If normal, these lab tests will only be needed in the future on an as needed basis if you develop symptoms or signs of liver dysfunction. Due to the exposure to chemotherapy drugs that can affect the kidney and bladder, it will be necessary to obtain [facility] kidney health lab (BUN/Cr) at entry into the [facility] but thereafter, if normal, needs to be only sent on an as needed basis.  3. Heart Health: As a result of your exposure to a chemotherapy class of drugs called anthracyclines (such as Doxorubicin) it will be necessary to obtain echos every 2 years. These can be done on the same day as routine  appointments with us in the cCSP. This was last done in 2013.  If you notice any new, changed or severe chest pain, shortness of breath or unexplained swelling, please notify a medical provider immediately. Encouraged regular physical activity and discussed the importance of maintaining a healthy weight/BMI and avoiding unhealthy lifestyle choices, such as smoking or excessive alcohol consumption.  4. Bone Health: The exposure to chemotherapy as a child (particularly methotrexate) results in a risk for long-term poor bone health such as decreased bone mineral density. This will need to be assessed with a baseline DEXA scan in the future which then can be repeated only on an as needed basis. We will check a yearly Vitamin D level as well.  5. Hormonal Health: As a result of the exposure to brain radiation and chemo such as cycophosphamide, there is a risk for hormonal abnormalities.  These will be screened for during our yearly cCSP visit with a thorough history and physical. In addition, yearly thyroid function tests should be ordered as well.  6. Mental Health: Many childhood cancer survivors are at risk for neurocognitive deficits as a result of the disruption to their early development for their necessary oncology treatments.  If any changes are noted regarding neurocognitive function (memory issues, information processing, comprehension, etc) or psychological health please let us and your primary care provider know immediately.  7. Dental Health: As a result of the radiation and chemo exposure, there is risk for increased cavities.  Therefore, it is critical to have routine preventative dental care every 6 months.    8. Eye Health: As a result of the radiation to the brain, there is risk for scatter to the eyes leading to eye late-effects such as cataracts and vision changes.  Therefore, it is critical to have eyes examined yearly by an eye specialist who is aware of the radiation history.  9. Metabolic Health:  There is increasing evidence in the childhood cancer survivor literature to suggest that survivors are at increased risk for things related to metabolic health such as obesity, high cholesterol, hormonal imbalance, high BP, poor renal function, etc.  Thus, a lipid panel for cholesterol and triglyceride screening should take place every 1-2 years and if any abnormality is found requiring intervention, we would recommend that the intervention occur immediately and aggressively.  10. Vaccine Health: Please confirm with your PCP that your vaccines are up to date, including HPV. You can now receive any vaccine that he/she recommends, there are no restrictions.  11. Hearing Health: As a result of brain radiation that could scatter to the inner ear, there is a risk for further hearing loss.   Therefore, it is critical to have your hearing checked if you notice a change.      Assessment and Plan:  42 yo M w/ h/o AML treated with chemo and whole brain radiation doing well with stable late -effects that need updated evaluations and treatments    PLAN:  1) CBC as per # 1 above - ordered as future lab to be done locally through lab-only visit  2) CMP as per #2 above - ordered as future lab to be done locally through lab-only visit  3) Echo as per #3 above - ordered as future test to be done locally  4) Lipid panel (cholesterol) and HgA1c (blood sugar) as per # 9 above - ordered as future lab to be done locally through lab-only visit  5) TSH and free T4 as per # 5 above - ordered as future lab to be done locally through lab-only visit  6) Vit D level as per #4 above - ordered as future lab to be done locally through lab-only visit  7) ENT and PT (balance/vesitublar group) referrals for vertigo  8) Neurology referral for updated evaluation and treatment of migraines and input on how frequently he needs brain MRIs and thoughts on peripheral neuropathy evaluation and treatment  9) PM&R - Cancer Rehab referral with Dr. Lackey for  caner-related peripheral neuropathy evaluation and treatment  10) Psychosocial recs per our cCSP  Senthil Oliva  11) return to see me in one year and in the meantime, as your test results come back in I'll send you a Sangart message with the results, what they mean and next steps.      Total time spent on the following services on the date of the encounter:  Preparing to see patient, chart review, review of outside records, Ordering medications, test, procedures, chemotherapy, Referring or communicating with other healthcare professionals, Performing a medically appropriate examination , Counseling and educating the patient/family/caregiver , Documenting clinical information in the electronic or other health record , Communicating results to the patient/family/caregiver , Care coordination  and Total time spent: 110 mins    Arti Pimentel MD

## 2021-05-10 ENCOUNTER — PATIENT OUTREACH (OUTPATIENT)
Dept: CARE COORDINATION | Facility: CLINIC | Age: 41
End: 2021-05-10

## 2021-05-10 NOTE — PROGRESS NOTES
Social Work Intervention  [facility]    Data/Intervention:    Patient Name:  Alfredo Brewer  /Age:  1980 (41 year old)    Visit Type: telephone  Referral Source: Dr. Pimentel   Reason for Referral:  Check in/ Brief Psychosocial     Collaborated With:    -pt    Intervention/Education/Resources Provided:  SW was asked to contact pt and complete a brief psychosocial assessment. SW called pt and introduced self, and explained the reason for calling. Pt asked if they can contact SW sometime tomorrow. SW provided pt with contact information and will wait for them to call.    Assessment/Plan:  SW will remain available as needed.  Provided patient/family with contact information and availability.    SERGE Titus,SADIE  Hematology/Oncology Social Worker  Phone:[phone] Pager: [phone]

## 2021-05-11 ENCOUNTER — TELEPHONE (OUTPATIENT)
Dept: OTOLARYNGOLOGY | Facility: CLINIC | Age: 41
End: 2021-05-11

## 2021-05-25 ENCOUNTER — HOSPITAL ENCOUNTER (OUTPATIENT)
Dept: PHYSICAL THERAPY | Facility: CLINIC | Age: 41
Setting detail: THERAPIES SERIES
End: 2021-05-25
Attending: PEDIATRICS
Payer: COMMERCIAL

## 2021-05-25 DIAGNOSIS — R42 VERTIGO: ICD-10-CM

## 2021-05-25 DIAGNOSIS — Z86.79 HISTORY OF CARDIAC DISORDER: ICD-10-CM

## 2021-05-25 DIAGNOSIS — Z92.21 STATUS POST CHEMOTHERAPY: ICD-10-CM

## 2021-05-25 DIAGNOSIS — Z85.6 HISTORY OF MYELOID LEUKEMIA: ICD-10-CM

## 2021-05-25 DIAGNOSIS — G43.109 MIGRAINE WITH TYPICAL AURA: ICD-10-CM

## 2021-05-25 DIAGNOSIS — Z86.39 HISTORY OF HYPERLIPIDEMIA: ICD-10-CM

## 2021-05-25 DIAGNOSIS — C92.01 AML (ACUTE MYELOID LEUKEMIA) IN REMISSION (H): ICD-10-CM

## 2021-05-25 DIAGNOSIS — E78.5 HYPERLIPIDEMIA, UNSPECIFIED HYPERLIPIDEMIA TYPE: ICD-10-CM

## 2021-05-25 DIAGNOSIS — T66.XXXS RADIATION INJURY OF BRAIN, SEQUELA: ICD-10-CM

## 2021-05-25 DIAGNOSIS — Z85.828 HISTORY OF BASAL CELL CARCINOMA: ICD-10-CM

## 2021-05-25 DIAGNOSIS — Z92.3 HISTORY OF RADIATION TO HEAD AND NECK REGION: ICD-10-CM

## 2021-05-25 DIAGNOSIS — G62.0 DRUG-INDUCED POLYNEUROPATHY (H): ICD-10-CM

## 2021-05-25 PROCEDURE — 97161 PT EVAL LOW COMPLEX 20 MIN: CPT | Mod: GP | Performed by: PHYSICAL THERAPIST

## 2021-05-25 PROCEDURE — 97112 NEUROMUSCULAR REEDUCATION: CPT | Mod: GP | Performed by: PHYSICAL THERAPIST

## 2021-05-25 NOTE — TELEPHONE ENCOUNTER
RECORDS STATUS - ALL OTHER DIAGNOSIS      RECORDS RECEIVED FROM: [facility]   DATE RECEIVED: 6/8/2021   NOTES STATUS DETAILS   OFFICE NOTE from referring provider Arti Thomas MD   OFFICE NOTE from medical oncologist Complete 5/7/2021 Virtual Visit- Migraine with typical aura (Primary Dx)     DISCHARGE SUMMARY from hospital N/A    DISCHARGE REPORT from the ER     OPERATIVE REPORT N/A    MEDICATION LIST Complete [facility]   CLINICAL TRIAL TREATMENTS TO DATE     LABS     PATHOLOGY REPORTS N/A    ANYTHING RELATED TO DIAGNOSIS Complete Labs last updated on 5/7/2021   GENONOMIC TESTING     TYPE:     IMAGING (NEED IMAGES & REPORT)     CT SCANS     MRI     MAMMO     ULTRASOUND     PET

## 2021-06-03 DIAGNOSIS — E78.5 HYPERLIPIDEMIA, UNSPECIFIED HYPERLIPIDEMIA TYPE: ICD-10-CM

## 2021-06-03 DIAGNOSIS — C92.01 AML (ACUTE MYELOID LEUKEMIA) IN REMISSION (H): ICD-10-CM

## 2021-06-03 DIAGNOSIS — G62.0 DRUG-INDUCED POLYNEUROPATHY (H): ICD-10-CM

## 2021-06-03 DIAGNOSIS — G43.109 MIGRAINE WITH TYPICAL AURA: ICD-10-CM

## 2021-06-03 DIAGNOSIS — Z86.39 HISTORY OF HYPERLIPIDEMIA: ICD-10-CM

## 2021-06-03 DIAGNOSIS — Z92.21 STATUS POST CHEMOTHERAPY: ICD-10-CM

## 2021-06-03 DIAGNOSIS — T66.XXXS RADIATION INJURY OF BRAIN, SEQUELA: ICD-10-CM

## 2021-06-03 DIAGNOSIS — R42 VERTIGO: ICD-10-CM

## 2021-06-03 DIAGNOSIS — Z86.79 HISTORY OF CARDIAC DISORDER: ICD-10-CM

## 2021-06-03 DIAGNOSIS — Z92.3 HISTORY OF RADIATION TO HEAD AND NECK REGION: ICD-10-CM

## 2021-06-03 DIAGNOSIS — Z85.6 HISTORY OF MYELOID LEUKEMIA: ICD-10-CM

## 2021-06-03 DIAGNOSIS — Z85.828 HISTORY OF BASAL CELL CARCINOMA: ICD-10-CM

## 2021-06-03 LAB
ALBUMIN SERPL-MCNC: 4.1 G/DL (ref 3.4–5)
ALP SERPL-CCNC: 71 U/L (ref 40–150)
ALT SERPL W P-5'-P-CCNC: 34 U/L (ref 0–70)
ANION GAP SERPL CALCULATED.3IONS-SCNC: 3 MMOL/L (ref 3–14)
AST SERPL W P-5'-P-CCNC: 23 U/L (ref 0–45)
BASOPHILS # BLD AUTO: 0.1 10E9/L (ref 0–0.2)
BASOPHILS NFR BLD AUTO: 0.9 %
BILIRUB SERPL-MCNC: 0.4 MG/DL (ref 0.2–1.3)
BUN SERPL-MCNC: 21 MG/DL (ref 7–30)
CALCIUM SERPL-MCNC: 9.1 MG/DL (ref 8.5–10.1)
CHLORIDE SERPL-SCNC: 104 MMOL/L (ref 94–109)
CHOLEST SERPL-MCNC: 187 MG/DL
CO2 SERPL-SCNC: 28 MMOL/L (ref 20–32)
CREAT SERPL-MCNC: 1.12 MG/DL (ref 0.66–1.25)
DEPRECATED CALCIDIOL+CALCIFEROL SERPL-MC: 33 UG/L (ref 20–75)
DIFFERENTIAL METHOD BLD: ABNORMAL
EOSINOPHIL # BLD AUTO: 0.3 10E9/L (ref 0–0.7)
EOSINOPHIL NFR BLD AUTO: 6.4 %
ERYTHROCYTE [DISTWIDTH] IN BLOOD BY AUTOMATED COUNT: 12.3 % (ref 10–15)
GFR SERPL CREATININE-BSD FRML MDRD: 81 ML/MIN/{1.73_M2}
GLUCOSE SERPL-MCNC: 91 MG/DL (ref 70–99)
HBA1C MFR BLD: 5.1 % (ref 0–5.6)
HCT VFR BLD AUTO: 39 % (ref 40–53)
HDLC SERPL-MCNC: 52 MG/DL
HGB BLD-MCNC: 13 G/DL (ref 13.3–17.7)
LDLC SERPL CALC-MCNC: 78 MG/DL
LYMPHOCYTES # BLD AUTO: 2.1 10E9/L (ref 0.8–5.3)
LYMPHOCYTES NFR BLD AUTO: 38.6 %
MCH RBC QN AUTO: 30.1 PG (ref 26.5–33)
MCHC RBC AUTO-ENTMCNC: 33.3 G/DL (ref 31.5–36.5)
MCV RBC AUTO: 90 FL (ref 78–100)
MONOCYTES # BLD AUTO: 0.6 10E9/L (ref 0–1.3)
MONOCYTES NFR BLD AUTO: 11.3 %
NEUTROPHILS # BLD AUTO: 2.3 10E9/L (ref 1.6–8.3)
NEUTROPHILS NFR BLD AUTO: 42.8 %
NONHDLC SERPL-MCNC: 135 MG/DL
PLATELET # BLD AUTO: 260 10E9/L (ref 150–450)
POTASSIUM SERPL-SCNC: 4.1 MMOL/L (ref 3.4–5.3)
PROT SERPL-MCNC: 7.9 G/DL (ref 6.8–8.8)
RBC # BLD AUTO: 4.32 10E12/L (ref 4.4–5.9)
SODIUM SERPL-SCNC: 135 MMOL/L (ref 133–144)
TRIGL SERPL-MCNC: 283 MG/DL
TSH SERPL DL<=0.005 MIU/L-ACNC: 1.93 MU/L (ref 0.4–4)
WBC # BLD AUTO: 5.3 10E9/L (ref 4–11)

## 2021-06-03 PROCEDURE — 80061 LIPID PANEL: CPT | Performed by: PEDIATRICS

## 2021-06-03 PROCEDURE — 36415 COLL VENOUS BLD VENIPUNCTURE: CPT | Performed by: PEDIATRICS

## 2021-06-03 PROCEDURE — 80050 GENERAL HEALTH PANEL: CPT | Performed by: PEDIATRICS

## 2021-06-03 PROCEDURE — 82306 VITAMIN D 25 HYDROXY: CPT | Performed by: PEDIATRICS

## 2021-06-03 PROCEDURE — 83036 HEMOGLOBIN GLYCOSYLATED A1C: CPT | Performed by: PEDIATRICS

## 2021-06-08 ENCOUNTER — PRE VISIT (OUTPATIENT)
Dept: ONCOLOGY | Facility: CLINIC | Age: 41
End: 2021-06-08

## 2021-06-08 ENCOUNTER — ONCOLOGY VISIT (OUTPATIENT)
Dept: ONCOLOGY | Facility: CLINIC | Age: 41
End: 2021-06-08
Attending: PEDIATRICS
Payer: COMMERCIAL

## 2021-06-08 VITALS
RESPIRATION RATE: 16 BRPM | BODY MASS INDEX: 26.07 KG/M2 | HEIGHT: 68 IN | SYSTOLIC BLOOD PRESSURE: 149 MMHG | WEIGHT: 172 LBS | HEART RATE: 101 BPM | DIASTOLIC BLOOD PRESSURE: 102 MMHG

## 2021-06-08 DIAGNOSIS — G62.0 DRUG-INDUCED POLYNEUROPATHY (H): ICD-10-CM

## 2021-06-08 DIAGNOSIS — C92.01 AML (ACUTE MYELOID LEUKEMIA) IN REMISSION (H): ICD-10-CM

## 2021-06-08 DIAGNOSIS — Z92.21 STATUS POST CHEMOTHERAPY: ICD-10-CM

## 2021-06-08 PROCEDURE — G0463 HOSPITAL OUTPT CLINIC VISIT: HCPCS

## 2021-06-08 PROCEDURE — 99205 OFFICE O/P NEW HI 60 MIN: CPT | Performed by: STUDENT IN AN ORGANIZED HEALTH CARE EDUCATION/TRAINING PROGRAM

## 2021-06-08 RX ORDER — LACTOBACILLUS RHAMNOSUS GG 10B CELL
1 CAPSULE ORAL DAILY
COMMUNITY

## 2021-06-08 ASSESSMENT — MIFFLIN-ST. JEOR: SCORE: 1651.75

## 2021-06-08 ASSESSMENT — PAIN SCALES - GENERAL: PAINLEVEL: NO PAIN (0)

## 2021-06-08 NOTE — LETTER
2021         RE: Alfredo Brewer  [address] [facility] [address]        Dear Colleague,    Thank you for referring your patient, Alfredo Brewer, to the [facility] [place]. Please see a copy of my visit note below.           PM&R Clinic Note     Patient Name: Alfredo Brewer : 1980 Medical Record: [id number]     Requesting Physician/clinician: Arti Pimentel MD           History of Present Illness:     Alfredo Brewer is a 41 year old male with history of AML (managed with chemotherapy and whole brain radiation and part of the Childhood Cancer Survivor Program) who presents to PM&R Clinic for evaluation and management of peripheral neuropathy symptoms.        Symptoms,  Patient was seen this morning in clinic, and complains of ongoing episodic peripheral neuropathy symptoms that have been present for several years.     He complains of burning, aching pain from his hip level bilaterally down to his ankles. He has these burning, aching episodes roughly once per month and they last a day or two when he has them. He has been taking amitriptyline for several years for management of migraines, and felt like at first this medication helped with his neuropathy symptoms, though with limited relief. He has also been prescribed gabapentin in the past, and takes this medication prn with good relief when he has symptoms.     He states that his neuropathy symptoms started around , and were originally started in his trunk radiating bilaterally to his knees. He has never had the burning, aching symptoms in his feet, and does complain of bilateral foot numbness, mainly over the plantar surface of his feet. He does not complain of ever having low back pain that radiates down his legs. And at the time his symptoms started, he was not on any chemotherapy regimen or maintenance medication regimen (chemotherapy was in ). He also denies having similar symptoms in childhood in the  when  he was receiving his chemotherapy.    Coincidentally, patient was diagnosed with a herniated disc at the C5-C6 level around 2007, and has undergone PT, chiropractic care, home traction and periodic epidural corticosteroid injections at [facility] for several years. His neck symptoms are still ongoing.     He denies bladder symptoms currently, however does endorse urinary incontinence from [id number] at the time he developed his neuropathic symptoms, and his providers at the time did not come to a definite diagnosis for his peripheral neuropathy symptoms or bladder incontinence. He reports having a few EMGs (one at [facility] and one with a provider in [place]) at that time which were negative. He also had a lumbar MRI done at that time which was negative for root or cord findings.    Patient complains of chronic constipation which is currently well managed with occasional senna and dietary modifications, and has had no other issues with bowel management.         Therapies/[facility],  Had PT for cervical herniated disc, has also had chiropractic care and does home traction for cervical pain.       Functionally,   Independent with mobilty, ADLs and IADLs             Past Medical and Surgical History:     No past medical history on file.  No past surgical history on file.         Social History:     Social History     Tobacco Use     Smoking status: Never Smoker     Smokeless tobacco: Never Used   Substance Use Topics     Alcohol use: Not on file       Living situation: Lives in [place], [place]  Vocational History: Works in insurance           Functional history:     Alfredo Brewer is independent with all aspects of his life.             Family History:     No family history on file.         Medications:     Current Outpatient Medications   Medication Sig Dispense Refill     AMITRIPTYLINE HCL PO Take by mouth At Bedtime       meclizine (ANTIVERT) 25 MG tablet Take 1 tablet (25 mg) by mouth every 6 hours as needed for dizziness  "30 tablet 1     Omeprazole (PRILOSEC PO) Take by mouth every morning       rosuvastatin (CRESTOR) 20 MG tablet Take 20 mg by mouth              Allergies:     Allergies   Allergen Reactions     Codeine      Nausea and vomiting     Erythromycin      Stomach pain     Prednisone      Stomach pain     Vicodin [Acetaminophen]      Stomach pain     Wheat Bran GI Disturbance              ROS:     A focused ROS is negative other than the symptoms noted above in the HPI.             Physical Examiniation:     VITAL SIGNS: There were no vitals taken for this visit.  BMI: Estimated body mass index is 25.75 kg/m  as calculated from the following:    Height as of 6/19/19: 1.71 m (5' 7.32\").    Weight as of 6/19/19: 75.3 kg (166 lb).    Gen: NAD, pleasant and cooperative   HEENT: Normocephalic, atraumatic, extraocular movements appear intact  Pulm: non-labored breathing in room air  Ext: no edema in BLE, no tenderness in calves  Neuro/MSK:   Orientation: Patient is oriented to person, place, time and situation. Exhibits good insight into his medical history and current treatments.  Motor: LE MMT- 4/5 with right hip flexion, 5/5 with right knee flexion/extension and ankle dorsiflexion, plantar flexion. 5/5 with left hip flexion, knee flexion/extension, ankle dorsiflexion/plantar flexion.  Sensory: Decreased sensation to light touch over L2, L3 dermatomes on right, otherwise sensation is intact bilaterally in all other dermatomes of bilateral lower extremities.  Reflexes: Patellar and Achilles are 2+ and symmetric bilaterally.  Back Exam: tenderness to palpation over left lower lumbar paraspinals, No tenderness to palpation over thoracic/lumbar spinous processes, bilateral SI joints or greater trochanters bilaterally. No exacerbation of pain with lumbar extension and lateral rotation or lumbar flexion.   Straight leg raise test negative bilaterally.            Laboratory/Imaging:     MR Cervical Spine (w/o Contrast) " 3/9/2019):  IMPRESSION:  1. There is degenerative change with right paracentral disc protrusion with annular tear slightly flattening the right hemicord at the C5-6 level. There may be some slight mass effect on the associated right nerve root.  2. At the C6-7 level there is probable moderate central spinal stenosis with potential moderately severe bilateral neural foraminal narrowing. Other degenerative changes are as described above.    MR Lumbar Spine (w/o Contrast 2/7/2009):  IMPRESSION:  1. No significant spinal canal or neural foraminal  stenosis.  2. Scattered foci of fatty marrow infiltration seen  throughout the lumbar spine and sacrum.         Assessment/Plan:   Alfredo Brewer is a 41 year old male with history of AML (managed with chemotherapy and whole brain radiation and part of the Childhood Cancer Survivor Program) who presents to PM&R Clinic for evaluation and management of peripheral neuropathy symptoms. As discussed with Alfredo, with his ongoing bilateral lower extremity neuropathic sensory symptoms and findings of asymmetric weakness with right hip flexion and decreased sensation in his right lower extremity on exam, it would be pertinent to further investigate for potential causes of his neuropathy. This will include imaging, labs and an EMG as he has not had any of these tests done in over a decade. Patient is in agreement with this plan. In regards to his symptoms, he is currently getting adequate relief with amitriptyline and gabapentin, and we also discussed non-medication modalities for treatment options with OT, though patient would like to wait on this with the number of appointments that he has.    1. Patient education: In depth discussion and education was provided about the assessment and implications of each of the below recommendations for management. Patient indicated readiness to learn, all questions were answered and understanding of material presented was  confirmed.  2. Work-up:  1. Labs ordered, further peripheral neuropathy work up- B6, B12, SPEP, folate, CK. TSH and HbA1c ordered by Dr. Pimentel are WNL.  2. Lumbar MRI ordered to further evaluate potential lumbar etiology for symptoms with lumbar pain, RLE weakness and RLE sensory findings on exam.  3. Repeat EMG ordered (last EMG was on 2007), with new weakness in RLE on exam and ongoing neuropathic symptoms.  3. Therapy/equipment/braces:  1. OT management modalities were discussed, however patient would like to hold off for now and we can re-explore these at a future visit if needed.  4. Medications:  1. Continue amitriptyline and gabapentin, as these have been giving adequate relief of symptoms.  5. Follow up: 2-3 months    Allyson Lackey MD  Physical Medicine & Rehabilitation    I appreciate the opportunity to participate in the care of your patient.                     Again, thank you for allowing me to participate in the care of your patient.        Sincerely,        Allyson Lackey MD

## 2021-06-08 NOTE — PATIENT INSTRUCTIONS
1. Labs have been ordered to further evaluate your peripheral neuropathy symptoms.  2. A Lumbar MRI has been ordered to further evaluate your ongoing symptoms and weakness on exam.  3. An repeat EMG has been ordered.  4. Continue amitriptyline and gabapentin for symptoms relief.  5. Follow up with Dr. Lackey in clinic in 2-3 months.

## 2021-06-08 NOTE — PROGRESS NOTES
PM&R Clinic Note     Patient Name: Alfredo Brewer : 1980 Medical Record: [id number]     Requesting Physician/clinician: Arti Pimentel MD           History of Present Illness:     Alfredo Brewer is a 41 year old male with history of AML (managed with chemotherapy and whole brain radiation and part of the Childhood Cancer Survivor Program) who presents to [facility] for evaluation and management of peripheral neuropathy symptoms.        Symptoms,  Patient was seen this morning in clinic, and complains of ongoing episodic peripheral neuropathy symptoms that have been present for several years.     He complains of burning, aching pain from his hip level bilaterally down to his ankles. He has these burning, aching episodes roughly once per month and they last a day or two when he has them. He has been taking amitriptyline for several years for management of migraines, and felt like at first this medication helped with his neuropathy symptoms, though with limited relief. He has also been prescribed gabapentin in the past, and takes this medication prn with good relief when he has symptoms.     He states that his neuropathy symptoms started around , and were originally started in his trunk radiating bilaterally to his knees. He has never had the burning, aching symptoms in his feet, and does complain of bilateral foot numbness, mainly over the plantar surface of his feet. He does not complain of ever having low back pain that radiates down his legs. And at the time his symptoms started, he was not on any chemotherapy regimen or maintenance medication regimen (chemotherapy was in ). He also denies having similar symptoms in childhood in the  when he was receiving his chemotherapy.    Coincidentally, patient was diagnosed with a herniated disc at the C5-C6 level around , and has undergone PT, chiropractic care, home traction and periodic epidural corticosteroid injections at [facility] for  several years. His neck symptoms are still ongoing.     He denies bladder symptoms currently, however does endorse urinary incontinence from [id number] at the time he developed his neuropathic symptoms, and his providers at the time did not come to a definite diagnosis for his peripheral neuropathy symptoms or bladder incontinence. He reports having a few EMGs (one at [facility] and one with a provider in [place]) at that time which were negative. He also had a lumbar MRI done at that time which was negative for root or cord findings.    Patient complains of chronic constipation which is currently well managed with occasional senna and dietary modifications, and has had no other issues with bowel management.         Therapies/HEP,  Had PT for cervical herniated disc, has also had chiropractic care and does home traction for cervical pain.       Functionally,   Independent with mobilty, ADLs and IADLs             Past Medical and Surgical History:     No past medical history on file.  No past surgical history on file.         Social History:     Social History     Tobacco Use     Smoking status: Never Smoker     Smokeless tobacco: Never Used   Substance Use Topics     Alcohol use: Not on file       Living situation: Lives in [place], [place]  Vocational History: Works in [employer]           Functional history:     Alfredo Brewer is independent with all aspects of his life.             Family History:     No family history on file.         Medications:     Current Outpatient Medications   Medication Sig Dispense Refill     AMITRIPTYLINE HCL PO Take by mouth At Bedtime       meclizine (ANTIVERT) 25 MG tablet Take 1 tablet (25 mg) by mouth every 6 hours as needed for dizziness 30 tablet 1     Omeprazole (PRILOSEC PO) Take by mouth every morning       rosuvastatin (CRESTOR) 20 MG tablet Take 20 mg by mouth              Allergies:     Allergies   Allergen Reactions     Codeine      Nausea and vomiting     Erythromycin       "Stomach pain     Prednisone      Stomach pain     Vicodin [Acetaminophen]      Stomach pain     Wheat Bran GI Disturbance              ROS:     A focused ROS is negative other than the symptoms noted above in the HPI.             Physical Examiniation:     VITAL SIGNS: There were no vitals taken for this visit.  BMI: Estimated body mass index is 25.75 kg/m  as calculated from the following:    Height as of 6/19/19: 1.71 m (5' 7.32\").    Weight as of 6/19/19: 75.3 kg (166 lb).    Gen: NAD, pleasant and cooperative   HEENT: Normocephalic, atraumatic, extraocular movements appear intact  Pulm: non-labored breathing in room air  Ext: no edema in BLE, no tenderness in calves  Neuro/MSK:   Orientation: Patient is oriented to person, place, time and situation. Exhibits good insight into his medical history and current treatments.  Motor: LE MMT- 4/5 with right hip flexion, 5/5 with right knee flexion/extension and ankle dorsiflexion, plantar flexion. 5/5 with left hip flexion, knee flexion/extension, ankle dorsiflexion/plantar flexion.  Sensory: Decreased sensation to light touch over L2, L3 dermatomes on right, otherwise sensation is intact bilaterally in all other dermatomes of bilateral lower extremities.  Reflexes: Patellar and Achilles are 2+ and symmetric bilaterally.  Back Exam: tenderness to palpation over left lower lumbar paraspinals, No tenderness to palpation over thoracic/lumbar spinous processes, bilateral SI joints or greater trochanters bilaterally. No exacerbation of pain with lumbar extension and lateral rotation or lumbar flexion.   Straight leg raise test negative bilaterally.            Laboratory/Imaging:     MR Cervical Spine (w/o Contrast) 3/9/2019):  IMPRESSION:  1. There is degenerative change with right paracentral disc protrusion with annular tear slightly flattening the right hemicord at the C5-6 level. There may be some slight mass effect on the associated right nerve root.  2. At the C6-7 " level there is probable moderate central spinal stenosis with potential moderately severe bilateral neural foraminal narrowing. Other degenerative changes are as described above.    MR Lumbar Spine (w/o Contrast 2/7/2009):  IMPRESSION:  1. No significant spinal canal or neural foraminal  stenosis.  2. Scattered foci of fatty marrow infiltration seen  throughout the lumbar spine and sacrum.         Assessment/Plan:   Alfredo Brewer is a 41 year old male with history of AML (managed with chemotherapy and whole brain radiation and part of the Childhood Cancer Survivor Program) who presents to [facility]R Clinic for evaluation and management of peripheral neuropathy symptoms. As discussed with Alfredo, with his ongoing bilateral lower extremity neuropathic sensory symptoms and findings of asymmetric weakness with right hip flexion and decreased sensation in his right lower extremity on exam, it would be pertinent to further investigate for potential causes of his neuropathy. This will include imaging, labs and an EMG as he has not had any of these tests done in over a decade. Patient is in agreement with this plan. In regards to his symptoms, he is currently getting adequate relief with amitriptyline and gabapentin, and we also discussed non-medication modalities for treatment options with OT, though patient would like to wait on this with the number of appointments that he has.    1. Patient education: In depth discussion and education was provided about the assessment and implications of each of the below recommendations for management. Patient indicated readiness to learn, all questions were answered and understanding of material presented was confirmed.  2. Work-up:  1. Labs ordered, further peripheral neuropathy work up- B6, B12, SPEP, folate, CK. TSH and HbA1c ordered by Dr. Pimentel are WNL.  2. Lumbar MRI ordered to further evaluate potential lumbar etiology for symptoms with lumbar pain, RLE weakness and RLE sensory findings  on exam.  3. Repeat EMG ordered (last EMG was on 2007), with new weakness in RLE on exam and ongoing neuropathic symptoms.  3. Therapy/equipment/braces:  1. OT management modalities were discussed, however patient would like to hold off for now and we can re-explore these at a future visit if needed.  4. Medications:  1. Continue amitriptyline and gabapentin, as these have been giving adequate relief of symptoms.  5. Follow up: 2-3 months    Allyson Lackey MD  Physical Medicine & Rehabilitation    I appreciate the opportunity to participate in the care of your patient.

## 2021-07-02 NOTE — TELEPHONE ENCOUNTER
1. Have you noticed any changes in hearing? Yes  2. Do you have ringing, buzzing, or other sounds in your ears or head, this is also referred to as Tinnitus? Yes  3. When and where was your last [facility] test? [facility] [address]   4. Do you feel lightheaded or foggy? Yes  5. Do you have a spinning sensation? Yes  6. Is there any specific position that can bring on dizziness? random  7. Does looking up cause dizziness? Yes  8. Does getting in and our of bed cause dizziness? Sometimes: sometimes   9. Does turning over in bed increase or cause dizziness? Sometimes: sometimes   10. Does bending over cause dizziness? Sometimes: sometimes   11. Is there anything that you can do to prevent the dizziness? Being more cautious   12. Has the dizziness gotten better with time? No  13. Have you seen Physical Therapy for dizziness? (Please indicate clinic and as much of the location as possible): Yes, If yes, where? [facility] , Also [facility] if yes, who?   14. Are you being referred to a specific physician? No  15. Have you been evaluated/treated for your dizziness at any other location?  (If yes,obtian as much clinic/provider/locaiton as possible) Yes. (If yes answer the following questions:)   Have you seen any ENT, Neurology, or other providers for these symptoms?             Yes, If yes, where? [facility], [place], [facility]   if yes, who?    Have you had any balance or Audiology testing? [facility] Have you had an MRI or CT scan of your head or neck? Yes, If yes, where? [facility] ([facility] or [facility])            [facility] if yes, who?     Would you like to receive your Release of Information by mail or e-mail?  e-mail

## 2021-07-05 NOTE — TELEPHONE ENCOUNTER
FUTURE VISIT INFORMATION      FUTURE VISIT INFORMATION:    Date: 9/21/2021    Time: 3:30PM    Location: [facility]  REFERRAL INFORMATION:    Referring provider:  Arti Pimentel MD    Referring providers clinic:  [facility]     Reason for visit/diagnosis  Vertigo- Referred by  Referred by Arti Pimentel MD in  [facility] LONG TERM F/U    RECORDS REQUESTED FROM:       Clinic name Comments Records Status Imaging Status   [facility] 5/25/2021 vestibular eval with Terrie Jiménez, PT [facility]    [facility] [place]  5/8/2018 audiogram  Emailed BELEN 7/5/21 - req 7/20/21 - received     [facility]  10/18/2018 audiogram   1/24/2019, 10/31/2018 PT note from Monie Hahn PT   11/26/2018, 11/19/2018 PT note from Gina De Santiago PT  11/15/2018 PT note from Kate Camarillo PT   11/8/2018 PT note from Calista Shaw PT  10/26/2018, 9/24/2018 note from Rosanne Jeffries PA    10/8/2018 audiology testing with Marlin Berg (no raw data)   9/25/2018 PT note from Marivel Khanna PT    Emailed BELEN 7/5/21 - req 7/20/21, 7/22/21 - received 7/27/21    [facility]  *checked with [facility], no head/brain imaging, most recent MRI was done in 2016 for cervical spine N/A N/A    [facility]  5/7/2021 note and referral from Arti Pimentel MD [facility] imaging  6/22/2019 MR Pituitary  [facility]  req 7/27/21 - PACS     7/5/2021 10:22AM emailed BELEN to patient [email or web address] - Amay   7/13/2021 4:01PM patient will try to get signed BELEN to me by the end of the week - Amay   7/20/2021 9:17AM received signed ROIs from patient, sent a fax for recs - Amay   *[facility] received, sent to scan, waiting for [facility] recs - Amay   7/22/2021 11:42AM sent a 2nd request to [facility] for recs - Amay   7/26/2021 9AM called and left [facility] med recs a voicemail with my direct number and fax on VM. Notified [facility] that we have not received recs from req sent on 7/20 and 7/22. If I do not hear from them today, I will call them back tomorrow - Amay    7/27/2021 9:48AM called [facility], spoke with Savannah from medical records to check on request status. Savannah will fax the records today - Amay   *10:44AM recs received from [facility], sent to scan. SEnt a fax to [facility] for images.  Delayed message sent to audioligy to review - Amay   8/10/2021 9:34AM images received from [facility] - Amay

## 2021-07-13 NOTE — TELEPHONE ENCOUNTER
Spoke with patient who confirmed that he received the ROIs via email on 7/5. Patient will work on getting those ROIs to me before the end of the week. Relayed to patient that I will be out for the rest of the week and will work on collecting his records next week once his signed ROIs have been received.     Genevieve   Clinic      7/20/2021 9:17AM update  Received signed BELEN from patient via email - Genevieve

## 2021-07-28 NOTE — TELEPHONE ENCOUNTER
Requesting orders for hearing test and VNG/rotary chair testing prior to ENT appointment with Dr. Nissen.      Per Chart Review: Patient referred from [facility]. Patient has a history of chemotherapy/radiation treatment for acute myeloid leukemia. Per recent PT note, dizziness seems to be correlated with migraines. Gets migraines about 2x/month, no room spinning, but bobble head. On PT exam, spontaneous right beating nystagmus was noted, and nystagmus during Rody-Hallpike testing which was not consistent with BPPV.     [facility] records: VNG testing completed in 2018 reported 60% right unilateral vestibular hypofunction.    Hearing test from 2018 showed normal hearing bilaterally.      Terri Mcadams.  Licensed Audiologist  [place] # [id number]

## 2021-07-29 DIAGNOSIS — R42 DIZZINESS: Primary | ICD-10-CM

## 2021-08-19 ENCOUNTER — TELEPHONE (OUTPATIENT)
Dept: AUDIOLOGY | Facility: CLINIC | Age: 41
End: 2021-08-19

## 2021-09-08 ENCOUNTER — TELEPHONE (OUTPATIENT)
Dept: AUDIOLOGY | Facility: CLINIC | Age: 41
End: 2021-09-08

## 2021-09-08 NOTE — TELEPHONE ENCOUNTER
"\"I m calling from the Audiology and Balance Testing department at the . This is just a call to remind you of your upcoming Balance Testing appointment on [Date], and to see if you have any questions or concerns regarding the balance testing you'll be doing. You should have received an itinerary via mail or via "[employer]", if you are active, that goes over what to expect and explains the dos and don ts both 48 hours before, and the day of. There is a list of medications for you to review on the itinerary that we would like you to stop taking beforehand. If you didn t receive the itinerary or you still have questions, please give our clinic a call at [phone]. Otherwise, we will see you on [Date] starting at [Time].\"    Please send encounter if patient would like to reschedule.  "

## 2021-09-15 NOTE — PROGRESS NOTES
"AUDIOLOGY REPORT-BALANCE ASSESSMENT    SUBJECTIVE: Alfredo Brewer, 41 year old, was seen in Audiology at the [facility] on 9/17/2021, for videonystagmography (VNG) and rotational chair testing referred by Rick Nissen, M.D. Patient is accompanied to today's visit by his wife. Patient presents with chief complaints of episodic vertigo, dizziness, imbalance and headache.    Patient reports his migraine headaches onset in 2007. In 2011/2012, he began to experience episodic vertigo, some blurred vision, dizziness and imbalance with his headaches. Patient describes his vertigo as the room spinning. Episodes typically lasts hours to days in duration. He reports his longest episode occurred in 2018 and lasted 2.5 days in duration. Between his episodes, patient reports occasional dizziness following cleaning his ears with a towel. He describes this brief dizziness as a \"bobble head\" feeling which can last from minutes to approximately 1/2-hour. During his episodes, patient reports lying on his left side and closing his eyes helps calm his system. Patient reports experiencing symptoms upon rolling over in bed a few times in the past but not frequently. He denies symptoms being provoked by busy visual environments (grocery store, shopping, watching TV/scrolling on phone), looking up or bending down. He reports no symptoms while driving.     Patient reports taking amitriptyline has helped prevent his migraines. He describes his migraine headaches as head pressure localized to his forehead. Over this past summer, he reports experiencing his episodes once a month. Patient reports some increased sensitivity to bright lights. He denies increased sensitivity to loud sounds. Patient denies dizziness with coughing, sneezing, blowing his nose, lifting heavy items and bearing down. He denies autophony (eye blinks). Patient denies history of head injuries and concussions. Patient reports " history of chemotherapy/radiation treatment for acute myeloid leukemia from 1980 to the first quarter of 1983. Patient reports his balance is good day-to-day; however, will worsen during his episodes. He is unsure whether he favors or veers to one side while walking during his episodes. Patient denies any falls to date. He denies concerns for future falls.    Patient's most recent hearing evaluation performed 2018 at an outside clinic revealed normal hearing bilaterally. Currently, patient reports some slight decline in hearing in both ears. He denies fluctuations or changes in hearing with his episodes. He reports occasional tinnitus, typically in either ear. Patient reports occasional aural fullness which he contributes to possible sinus issues. He reports experiencing two sinus colds per year. Patient denies otalgia, otorrhea, and history of otologic surgeries. Family history is positive for presbycusis/possible noise induced hearing loss from power tool use (father and grandfather) and negative for migraines and vertigo. Patient denies consumption of caffeinated beverages, nicotine, alcoholic substances, or use of medications with known vestibular interactions within the past 48 hours.    OBJECTIVE:  Rotational chair testing:  Sinusoidal harmonic acceleration test: 0.01, 0.02, 0.04, 0.08, 0.16, 0.32, and 0.64 Hz.  Spontaneous nystagmus: Absent  Phase: Abnormal low-mid frequency phase lead (0.01-0.08 Hz) sloping to normal high frequency phase lead (0.16-0.64 Hz)  Gain: Normal gain  Symmetry: Normal  Spectral Purity: Good  Overall rotational chair test: Normal gain, abnormal low-mid frequency phase lead (0.01-0.08 Hz) sloping to normal high frequency phase lead (0.16-0.64 Hz), normal symmetry, and good spectral purity; pattern suggests a previous peripheral lesion and a compensated peripheral vestibular system.    Videonystagmography (VNG) testing:  Prescreening:  Tympanograms: Normal eardrum mobility bilaterally.  Note: this test is completed to determine the status of the middle ear before irrigations are completed. *Patient denies dizziness with tympanometry.   Ocular range of motion and ocular counter roll: Normal  Cross/cover: Normal  Head Thrust: Positive to right, negative to left.    Nystagmus Tests:  Gaze-Horizontal with Fixation:   Center: Normal   Right: Normal   Left: Normal  Gaze-Vertical with Fixation:   Up: Normal   Down: Normal  Gaze with Fixation Denied   Center: Normal   Right: 1 degree/s right beating nystagmus, likely endpoint\nonsignificant.   Left: 1 degree/s left beating nystagmus, likely endpoint\nonsignificant.   Up: Normal  High Frequency Headshake:   Horizontal: Positive. 7 degrees/s right beating nystagmus, no symptoms.   Vertical: Negative. Few slight 2 degree/s left beats, no symptoms.    Rody-Hallpike and Roll Test:  Rody-Hallpike Head Right: Negative for PC BPPV. No nystagmus or symptoms in supine. Patient reports mild brief dizziness upon rising to seated position, no nystagmus.  [place]-Hallpike Head Left: (following additional review) Possible Positive for PC BPPV. Slight left and up torsional nystagmus increasing to 2-3 degrees/s left and up torsional nystagmus at 20 seconds for 20 seconds before fatiguing to slight non-fatiguing 1-2 degrees/s left and up torsional nystagmus, some dizziness/spinning timed to increase of nystagmus. No reversal in seated.  Positional Head Hanging/Repeat Positional Head Hanging (Left Epley progression): Same as left [place]-Hallpike supine but possible fatigue after 60-70 seconds, no nystagmus in second position, slight right and down torsional nystagmus increasing to 3-4 degrees/s right and down torsional nystagmus with corresponding mild dizziness/spinning at 15 seconds for 20 seconds and then fatiguing to 1-2 degrees/s right and down torsional nystagmus in third position with possible full fatigue by 90 seconds. No nystagmus\no reversal or symptoms with final  position.  Repeat Rody-Hallpike Head Left: Same as initial left Thorpe-Hallpike; presentation may suggest possible BPPV or possible central indications if non-responsive to additional BPPV treatments.   Roll Test Head Right: Negative for HC BPPV. No nystagmus or symptoms.  Roll Test Head Left: Negative for HC BPPV. 1-2 degrees/s left and up beating nystagmus, unable to suppress with fixation, no symptoms.    Positional Testing:  Positionals: Supine: Intermittent 1-2 degrees/s right beating nystagmus, saccadic intrusions with fixation, no symptoms.  Positionals: Body Right: 1 degree/s right beating nystagmus, unable to fully suppress with fixation, no symptoms.  Positionals: Body Left: 10 degrees/s left and 4 degrees/s up beating nystagmus, decreases but unable to fully suppress with fixation, symptoms reported during turn to the left but none in position.  Positionals: Pre-Caloric: 2 degrees/s right beating nystagmus mixed with rotary down beating nystagmus, decreases but unable to fully suppress with fixation, no symptoms.    Oculomotor Tests:  Saccades: Essentially normal normal  Anti-saccades: Normal, patient can complete task.  Pursuit: Normal    Calorics:  (Tested at 44 degrees and 30 degrees Celsius for 30 seconds for warm and cool water, respectively):  Right Warm Eye Speed: 0 degrees per second/no response.  Left Warm Eye Speed: 16 degrees per second left beating  Right Cool Eye Speed: 6 degrees per second left beating  Left Cool Eye Speed: 10 degrees per second right beating  Difference between ear: 62% right hypofunction. (Greater than 25% considered clinically significant.) *As compared to previous 2018 VNG results from outside clinic (60% right hypofunction), no significant change today.   Fixation Index: 0.06 Normal  Overall caloric test: Abnormal     ASSESSMENT:  1. Indications of central vestibular system involvement noted on today's exam were as follows:     -Left and up beating nystagmus (10 & 4  "degrees/s), decreasing but unable to fully suppress with fixation, evident in Body Left Positional and at a lower degree (1-2 degrees/s) in Roll Test Head Left.    -Mild right mixed with down rotary nystagmus (2 degrees/s), decreasing but unable to fully suppress with fixation, evident in Pre-Caloric Positional.    2. Indications of peripheral vestibular system involvement noted on today's exam were as follows:     -Positive Right Head Thrust and Positive Horizontal High Frequency Headshake.      -Abnormal Calorics: Bithermal water irrigations revealed a significant 62% right hypofunction.    -Rotary Chair testing: pattern suggests a previous peripheral lesion and a compensated peripheral vestibular system.    -Possible Positive for Left posterior canal benign paroxysmal positional vertigo (PC BPPV); see \"Rody-Hallpike and Roll Test\" section above.    PLAN:  Recommend referral to vestibular physical therapy for BPPV recheck pending further review by the referring provider. Proceed with hearing evaluation as scheduled on 9/21/2021. Follow-up with Dr. Rick Nissen regarding today's results and for medical management on 9/21/2021. Please call this clinic at [phone] with questions regarding these results or recommendations.       Terri Vuong. CCC-A  Licensed Audiologist   [place] #[id number]    "

## 2021-09-17 ENCOUNTER — OFFICE VISIT (OUTPATIENT)
Dept: AUDIOLOGY | Facility: CLINIC | Age: 41
End: 2021-09-17
Payer: COMMERCIAL

## 2021-09-17 DIAGNOSIS — R51.9 HEADACHE: ICD-10-CM

## 2021-09-17 DIAGNOSIS — R42 DIZZINESS AND GIDDINESS: Primary | ICD-10-CM

## 2021-09-17 PROCEDURE — 92546 SINUSOIDAL ROTATIONAL TEST: CPT | Performed by: AUDIOLOGIST

## 2021-09-17 PROCEDURE — 92567 TYMPANOMETRY: CPT | Performed by: AUDIOLOGIST

## 2021-09-17 PROCEDURE — 92542 POSITIONAL NYSTAGMUS TEST: CPT | Mod: 59 | Performed by: AUDIOLOGIST

## 2021-09-17 PROCEDURE — 92541 SPONTANEOUS NYSTAGMUS TEST: CPT | Performed by: AUDIOLOGIST

## 2021-09-17 PROCEDURE — 92545 OSCILLATING TRACKING TEST: CPT | Mod: 59 | Performed by: AUDIOLOGIST

## 2021-09-17 PROCEDURE — 92537 CALORIC VSTBLR TEST W/REC: CPT | Performed by: AUDIOLOGIST

## 2021-09-20 DIAGNOSIS — R42 DIZZINESS: Primary | ICD-10-CM

## 2021-09-20 NOTE — PATIENT INSTRUCTIONS
1. You were seen in the ENT Clinic today by Dr. Nissen.  If you have any questions or concerns after your appointment, please call   - Option 1: ENT Clinic: [phone]   - Option 2: Monalisa (Dr. Nissen's Nurse): [phone]                   Bettie(Dr. Nissen's Nurse): [phone]      2.   Plan to return to clinic in 4 months with hearing test    3. Dyazide- once daily    Monalisa Johnson LPN  [facility] - Otolaryngology

## 2021-09-21 ENCOUNTER — PRE VISIT (OUTPATIENT)
Dept: OTOLARYNGOLOGY | Facility: CLINIC | Age: 41
End: 2021-09-21

## 2021-09-21 ENCOUNTER — OFFICE VISIT (OUTPATIENT)
Dept: AUDIOLOGY | Facility: CLINIC | Age: 41
End: 2021-09-21
Attending: OTOLARYNGOLOGY
Payer: COMMERCIAL

## 2021-09-21 ENCOUNTER — OFFICE VISIT (OUTPATIENT)
Dept: OTOLARYNGOLOGY | Facility: CLINIC | Age: 41
End: 2021-09-21
Payer: COMMERCIAL

## 2021-09-21 VITALS — WEIGHT: 177.25 LBS | HEIGHT: 68 IN | BODY MASS INDEX: 26.86 KG/M2

## 2021-09-21 DIAGNOSIS — H61.23 EXCESSIVE CERUMEN IN BOTH EAR CANALS: ICD-10-CM

## 2021-09-21 DIAGNOSIS — G43.009 MIGRAINE WITHOUT AURA AND WITHOUT STATUS MIGRAINOSUS, NOT INTRACTABLE: ICD-10-CM

## 2021-09-21 DIAGNOSIS — R42 DIZZINESS: Primary | ICD-10-CM

## 2021-09-21 DIAGNOSIS — R42 DIZZINESS: ICD-10-CM

## 2021-09-21 PROCEDURE — 92565 STENGER TEST PURE TONE: CPT | Performed by: AUDIOLOGIST

## 2021-09-21 PROCEDURE — 92550 TYMPANOMETRY & REFLEX THRESH: CPT | Performed by: AUDIOLOGIST

## 2021-09-21 PROCEDURE — 99204 OFFICE O/P NEW MOD 45 MIN: CPT | Mod: 25 | Performed by: OTOLARYNGOLOGY

## 2021-09-21 PROCEDURE — 92504 EAR MICROSCOPY EXAMINATION: CPT | Performed by: OTOLARYNGOLOGY

## 2021-09-21 PROCEDURE — 92557 COMPREHENSIVE HEARING TEST: CPT | Performed by: AUDIOLOGIST

## 2021-09-21 RX ORDER — TRIAMTERENE AND HYDROCHLOROTHIAZIDE 37.5; 25 MG/1; MG/1
1 CAPSULE ORAL DAILY
Qty: 30 CAPSULE | Refills: 6 | Status: SHIPPED | OUTPATIENT
Start: 2021-09-21 | End: 2021-09-21

## 2021-09-21 RX ORDER — TRIAMTERENE AND HYDROCHLOROTHIAZIDE 37.5; 25 MG/1; MG/1
1 CAPSULE ORAL DAILY
Qty: 120 CAPSULE | Refills: 2 | Status: SHIPPED | OUTPATIENT
Start: 2021-09-21 | End: 2022-05-20

## 2021-09-21 ASSESSMENT — PAIN SCALES - GENERAL: PAINLEVEL: NO PAIN (0)

## 2021-09-21 ASSESSMENT — MIFFLIN-ST. JEOR: SCORE: 1675.56

## 2021-09-21 NOTE — PROGRESS NOTES
Dear Robin Johnson:    I had the pleasure of meeting Alfredo Brewer in consultation today at the [facility] at your request.    CHIEF COMPLAINT: Dizziness    HISTORY OF PRESENT ILLNESS: Patient is a 41-year-old in today for assessment of dizziness.  He has been having dizziness for almost 9 years.  Gradually getting worse.  Said initially was getting maybe 1 or 2 episodes a year.  Over the past couple years now has been getting for 5 episodes a year.  He describes 2 types of episodes.  1 where it less severe and he describes just an off-balance feeling that he is able to work through.  More severe when he has spinning with nausea and has to go to bed for several hours.  He does not notice any tinnitus in either ear, is not aware of any hearing fluctuation.  He feels his hearing is equal and symmetrical.  There is no associated pressure or pressure feeling in his ears at all, denies any dysphagia, hoarseness, facial paresthesias.  He did have assessment in 2018 had vestibular testing at that time which showed 60% weakness in the right ear on caloric testing.  He went to physical therapy at that time no other explanation or discussion that he can remember.  Underwent a recent vestibular testing through the [place] here and had 60% weakness in the right ear.  He has been diagnosed with migraines and takes amitriptyline at bedtime since 2008 which has seemed to help that.  He has maybe 2 migraines a year, occasionally will have an attack with migraine he thinks may be 25% at times.  He is on cholesterol medication and Prilosec in addition to the amitriptyline.  Of interest is he was diagnosed with leukemia at 6 months of age and has had chemotherapy for that.  Had an excellent response and and has had occasional MRIs since then, he thinks the last one was about 2017.  He has had contrast given and although I do not have those available, would have to assume there is no  retrocochlear etiology found.    ALLERGIES:    Allergies   Allergen Reactions     Codeine      Nausea and vomiting     Erythromycin      Stomach pain     Prednisone      Stomach pain     Vicodin [Acetaminophen]      Stomach pain     Wheat Bran GI Disturbance       HABITS: Social History    Substance and Sexual Activity      Alcohol use: Not on file     History   Smoking Status     Never Smoker   Smokeless Tobacco     Never Used         PAST MEDICAL HISTORY: Please see today's intake form (for the remainder of the [facility]H) which I reviewed and signed.  History reviewed. No pertinent past medical history.    FAMILY HISTORY/SOCIAL HISTORY: History reviewed. No pertinent family history.   Social History     Socioeconomic History     Marital status:      Spouse name: Not on file     Number of children: Not on file     Years of education: Not on file     Highest education level: Not on file   Occupational History     Not on file   Tobacco Use     Smoking status: Never Smoker     Smokeless tobacco: Never Used   Substance and Sexual Activity     Alcohol use: Not on file     Drug use: Not on file     Sexual activity: Not on file   Other Topics Concern     Not on file   Social History Narrative     Not on file     Social Determinants of Health     Financial Resource Strain:      Difficulty of Paying Living Expenses:    Food Insecurity:      Worried About Running Out of Food in the Last Year:      Ran Out of Food in the Last Year:    Transportation Needs:      Lack of Transportation (Medical):      Lack of Transportation (Non-Medical):    Physical Activity:      Days of Exercise per Week:      Minutes of Exercise per Session:    Stress:      Feeling of Stress :    Social Connections:      Frequency of Communication with Friends and Family:      Frequency of Social Gatherings with Friends and Family:      Attends Mu-ism Services:      Active Member of Clubs or Organizations:      Attends Club or Organization Meetings:       Marital Status:    Intimate Partner Violence:      Fear of Current or Ex-Partner:      Emotionally Abused:      Physically Abused:      Sexually Abused:        REVIEW OF SYSTEMS: [unfilled]    The remainder of the 10 point ROS is negative    PHYSICIAL EXAMINATION:  Constitutional: The patient was well-groomed and in no acute distress.   Skin: Warm and pink.  Psychiatric: The patient's affect was calm, cooperative, and appropriate.   Respiratory: Breathing comfortably without stridor or exertion of accessory muscles.  Eyes: Pupils were equal and reactive. Extraocular movement intact.   Head: Normocephalic and atraumatic. No lesions or scars.  Ears: Patient placed under the microscope for microscopic evaluation and cleaning of cerumen which was obscuring full visualization and complete assessment of both TMs. Under high power magnification, the right ear was examined and cleaned of cerumen using curet, alligator forceps, and suction.  After cleaning, TM is fully visualized and has normal position with normal middle ear aeration. The left ear was then cleaned and inspected using microscope, instruments and similar techniques. After cleaning of cerumen, the TM has normal position with normal aeration to middle ear.  Nose: Sinuses were nontender. Anterior rhinoscopy revealed midline septum and absence of purulence or polyps.  Oral Cavity: Normal tongue, floor of mouth, buccal mucosa, and palate. No lesions or masses on inspection or palpation. No abnormal lymph tissue in the oropharynx.   Neck: The parotid is soft without masses. Supple with normal laryngeal and tracheal landmarks.   Lymphatic: There is no palpable lymphadenopathy or other masses in the neck.   Neurologic: Alert and oriented x 3. Cranial nerves III-XI within normal limits. Voice quality normal.  Cerebellar Function Tests:  Grossly normal    Audiogram: Audiogram performed shows normal hearing in both ears through all frequencies.  Excellent  discrimination at 100% in each ear.  Normal type A tympanograms bilaterally.    VNG testing completed shows 60% right hypofunction rotational chair shows compensated peripheral vestibular system.      IMPRESSION AND PLAN:   1. Dizziness: Discussed with him possible Ménière's with the description of episodic vertigo.  Does not have any unilateral hearing loss or noticed any fluctuating hearing loss.  There is no associated tinnitus or ear pressure.  Cannot give him definitive diagnosis for Ménière's but possibilities by the description of symptoms, may be just vestibular Ménière's.  Also discussed could be just migraine associated vertigo.  Multiple questions answered, long discussion.  We decided to go ahead and treat him as possible Ménière's with diuretic.  Discussed salt restrictions.  He is going to watch for any tinnitus or hearing fluctuation with any episodes.  Gave him 4 months once a day Dyazide, will follow-up in 4 months with audio and reassess.  2. Excessive cerumen: Cleaned today, no further treatment needed, monitor.  3. Migraines: Continue treatment with amitriptyline, monitor.    Thank you very much for the opportunity to participate in the care of your patient.    Rick L Nissen MD

## 2021-09-21 NOTE — LETTER
9/21/2021       RE: Alfredo Brewer  [id number] [facility] [id number]     Dear Colleague,    Thank you for referring your patient, Alfredo Brewer, to the [facility] [place] [facility]. Please see a copy of my visit note below.    Dear Robin Johnson:    I had the pleasure of meeting Alfredo Brewer in consultation today at the [facility] at your request.    CHIEF COMPLAINT: Dizziness    HISTORY OF PRESENT ILLNESS: Patient is a 41-year-old in today for assessment of dizziness.  He has been having dizziness for almost 9 years.  Gradually getting worse.  Said initially was getting maybe 1 or 2 episodes a year.  Over the past couple years now has been getting for 5 episodes a year.  He describes 2 types of episodes.  1 where it less severe and he describes just an off-balance feeling that he is able to work through.  More severe when he has spinning with nausea and has to go to bed for several hours.  He does not notice any tinnitus in either ear, is not aware of any hearing fluctuation.  He feels his hearing is equal and symmetrical.  There is no associated pressure or pressure feeling in his ears at all, denies any dysphagia, hoarseness, facial paresthesias.  He did have assessment in 2018 had vestibular testing at that time which showed 60% weakness in the right ear on caloric testing.  He went to physical therapy at that time no other explanation or discussion that he can remember.  Underwent a recent vestibular testing through the [place] here and had 60% weakness in the right ear.  He has been diagnosed with migraines and takes amitriptyline at bedtime since 2008 which has seemed to help that.  He has maybe 2 migraines a year, occasionally will have an attack with migraine he thinks may be 25% at times.  He is on cholesterol medication and Prilosec in addition to the amitriptyline.   Of interest is he was diagnosed with leukemia at 6 months of age and has had chemotherapy for that.  Had an excellent response and and has had occasional MRIs since then, he thinks the last one was about 2017.  He has had contrast given and although I do not have those available, would have to assume there is no retrocochlear etiology found.    ALLERGIES:    Allergies   Allergen Reactions     Codeine      Nausea and vomiting     Erythromycin      Stomach pain     Prednisone      Stomach pain     Vicodin [Acetaminophen]      Stomach pain     Wheat Bran GI Disturbance       HABITS: Social History    Substance and Sexual Activity      Alcohol use: Not on file     History   Smoking Status     Never Smoker   Smokeless Tobacco     Never Used         PAST MEDICAL HISTORY: Please see today's intake form (for the remainder of the PMH) which I reviewed and signed.  History reviewed. No pertinent past medical history.    FAMILY HISTORY/SOCIAL HISTORY: History reviewed. No pertinent family history.   Social History     Socioeconomic History     Marital status:      Spouse name: Not on file     Number of children: Not on file     Years of education: Not on file     Highest education level: Not on file   Occupational History     Not on file   Tobacco Use     Smoking status: Never Smoker     Smokeless tobacco: Never Used   Substance and Sexual Activity     Alcohol use: Not on file     Drug use: Not on file     Sexual activity: Not on file   Other Topics Concern     Not on file   Social History Narrative     Not on file     Social Determinants of Health     Financial Resource Strain:      Difficulty of Paying Living Expenses:    Food Insecurity:      Worried About Running Out of Food in the Last Year:      Ran Out of Food in the Last Year:    Transportation Needs:      Lack of Transportation (Medical):      Lack of Transportation (Non-Medical):    Physical Activity:      Days of Exercise per Week:      Minutes of Exercise  per Session:    Stress:      Feeling of Stress :    Social Connections:      Frequency of Communication with Friends and Family:      Frequency of Social Gatherings with Friends and Family:      Attends [employer] Services:      Active Member of Clubs or Organizations:      Attends Club or Organization Meetings:      Marital Status:    Intimate Partner Violence:      Fear of Current or Ex-Partner:      Emotionally Abused:      Physically Abused:      Sexually Abused:        REVIEW OF SYSTEMS: [unfilled]    The remainder of the 10 point ROS is negative    PHYSICIAL EXAMINATION:  Constitutional: The patient was well-groomed and in no acute distress.   Skin: Warm and pink.  Psychiatric: The patient's affect was calm, cooperative, and appropriate.   Respiratory: Breathing comfortably without stridor or exertion of accessory muscles.  Eyes: Pupils were equal and reactive. Extraocular movement intact.   Head: Normocephalic and atraumatic. No lesions or scars.  Ears: Patient placed under the microscope for microscopic evaluation and cleaning of cerumen which was obscuring full visualization and complete assessment of both TMs. Under high power magnification, the right ear was examined and cleaned of cerumen using curet, alligator forceps, and suction.  After cleaning, TM is fully visualized and has normal position with normal middle ear aeration. The left ear was then cleaned and inspected using microscope, instruments and similar techniques. After cleaning of cerumen, the TM has normal position with normal aeration to middle ear.  Nose: Sinuses were nontender. Anterior rhinoscopy revealed midline septum and absence of purulence or polyps.  Oral Cavity: Normal tongue, floor of mouth, buccal mucosa, and palate. No lesions or masses on inspection or palpation. No abnormal lymph tissue in the oropharynx.   Neck: The parotid is soft without masses. Supple with normal laryngeal and tracheal landmarks.   Lymphatic: There is no  palpable lymphadenopathy or other masses in the neck.   Neurologic: Alert and oriented x 3. Cranial nerves III-XI within normal limits. Voice quality normal.  Cerebellar Function Tests:  Grossly normal    Audiogram: Audiogram performed shows normal hearing in both ears through all frequencies.  Excellent discrimination at 100% in each ear.  Normal type A tympanograms bilaterally.    VNG testing completed shows 60% right hypofunction rotational chair shows compensated peripheral vestibular system.      IMPRESSION AND PLAN:   1. Dizziness: Discussed with him possible Ménière's with the description of episodic vertigo.  Does not have any unilateral hearing loss or noticed any fluctuating hearing loss.  There is no associated tinnitus or ear pressure.  Cannot give him definitive diagnosis for Ménière's but possibilities by the description of symptoms, may be just vestibular Ménière's.  Also discussed could be just migraine associated vertigo.  Multiple questions answered, long discussion.  We decided to go ahead and treat him as possible Ménière's with diuretic.  Discussed salt restrictions.  He is going to watch for any tinnitus or hearing fluctuation with any episodes.  Gave him 4 months once a day Dyazide, will follow-up in 4 months with audio and reassess.  2. Excessive cerumen: Cleaned today, no further treatment needed, monitor.  3. Migraines: Continue treatment with amitriptyline, monitor.    Thank you very much for the opportunity to participate in the care of your patient.    Rick L Nissen MD

## 2021-09-21 NOTE — PROGRESS NOTES
AUDIOLOGY REPORT    SUMMARY: Audiology visit completed. See audiogram for results.      RECOMMENDATIONS: Follow-up with ENT.      Terri Bond.  Licensed Audiologist  [place] #[id number]

## 2021-11-29 ENCOUNTER — IMMUNIZATION (OUTPATIENT)
Dept: NURSING | Facility: CLINIC | Age: 41
End: 2021-11-29
Payer: COMMERCIAL

## 2021-11-29 PROCEDURE — 91306 COVID-19,PF,MODERNA (18+ YRS BOOSTER .25ML): CPT

## 2021-11-29 PROCEDURE — 0064A COVID-19,PF,MODERNA (18+ YRS BOOSTER .25ML): CPT

## 2021-12-31 NOTE — TELEPHONE ENCOUNTER
FUTURE VISIT INFORMATION      FUTURE VISIT INFORMATION:    Date: 1/26/2022    Time: 9am    [place] [facility] [place]ERRAL INFORMATION:    Referring provider:  Dr. Lackey     Referring providers clinic:  [facility]     Reason for visit/diagnosis  AML     RECORDS REQUESTED FROM:       Clinic name Comments Records Status Imaging Status   Internal Dr. Lackey-6/8/2021    [facility]-5/7/2021 Epic N/A          [facility]  MR Pituitary-6/22/2019    MR Cervical Spine-6/9/2019 Care Everywhere Requested to PACS                       1/19/2022-Request for images faxed to [facility]-MR @ 758am    1/25/2022-[facility] Images now in PACS-MR @ 7am

## 2022-01-11 ENCOUNTER — E-VISIT (OUTPATIENT)
Dept: URGENT CARE | Facility: CLINIC | Age: 42
End: 2022-01-11

## 2022-01-11 DIAGNOSIS — Z20.822 SUSPECTED COVID-19 VIRUS INFECTION: ICD-10-CM

## 2022-01-11 DIAGNOSIS — J02.9 SORE THROAT: ICD-10-CM

## 2022-01-11 PROCEDURE — 99421 OL DIG E/M SVC 5-10 MIN: CPT | Performed by: EMERGENCY MEDICINE

## 2022-01-11 NOTE — PATIENT INSTRUCTIONS
Alfredo,    Your symptoms show that you may have coronavirus (COVID-19). This illness can cause fever, cough and trouble breathing. Many people get a mild case and get better on their own. Some people can get very sick.    Because you also reported sore throat, I would like to also test you for Strep Throat to determine if we need to treat you for that as well.    What should I do?  We would like to test you for COVID-19 virus and Strep Throat. I have placed orders for these tests. To schedule: go to your [employer] home page and scroll down to the section that says  You have an appointment that needs to be scheduled  and click the large green button that says  Schedule Now  and follow the steps to find the next available openings. It is important that when you are asked what the reason for your appointment is that you mention you need BOTH COVID and Strep tests.    If you are unable to complete these [employer] scheduling steps, please call [phone] to schedule your testing.     Return to work/school/ guidance:   Please let your workplace manager and staffing office know when your isolation ends.       If you receive a positive COVID-19 test result, follow the guidance of the those who are giving you the results. Usually the return to work is 10 days from symptom onset or positive test date (or in some cases 20 days if you are immunocompromised). If your symptoms started after your positive test, the 10 days should start when your symptoms started.   o If you work at [employer] [place], you must also be cleared by Employee Occupational Health and Safety to return to work.      If you receive a negative COVID-19 test result and did not have a high risk exposure to someone with a known positive COVID-19 test, you can return to work once you're free of fever for 24 hours without fever-reducing medication and your symptoms are improving or resolved.    If you receive a negative COVID-19 test and if you had a high  risk exposure to someone who has tested positive for COVID-19 then you can return to work 14 days after your last contact with the positive individual. Follow quarantine guidance given by your doctor or public health officials.    Sign up for [employer].   We know it's scary to hear that you might have COVID-19. We want to track your symptoms to make sure you're okay over the next 2 weeks. Please look for an email from [employer]--this is a free, online program that we'll use to keep in touch. To sign up, follow the link in the email you will receive. Learn more at [email or web address]    How can I take care of myself?  Over the counter medications may help with your symptoms like congestion, cough, chills, or fever.There are not many effective prescription treatments for early COVID-19. Hydroxychloroquine, ivermectin, and azithromycin are not effective or recommended for COVID-19.    If your symptoms started in the last 10 days, you may be able to receive a treatment with monoclonal antibodies. This treatment can lower your risk of severe illness and going to the hospital. It is given through an IV or under your skin (subcutaneous) and must be given at an infusion center. You must be 12 or older, weight at least 88 pounds, and have a positive COVID-19 test.      If you would like to sign up to be considered to receive the monoclonal antibody medicine, please complete a participation form through the [facility] here: MNRAP ([email or web address].[email or web address]/[email or web address]/[email or web address]/[email or web address]). You may also call the [facility] COVID-19 Public Hotline at 1-992.996.5862 (open Mon-Fri: 9am-7pm and Sat: 10am-6pm).     Not all people who are eligible will receive the medicine, since supply is limited. You will be contacted in the next 1 to 2 business days only if you are selected. If you do not receive a call, you have not been selected to receive the medicine. If you have any questions about this  medication, please contact your primary care provider. For more information, see [email or web address]/[email or web address]      Get lots of rest. Drink extra fluids (unless a doctor has told you not to)    Take Tylenol (acetaminophen) or ibuprofen for fever or pain. If you have liver or kidney problems, ask your family doctor if it's okay to take Tylenol or ibuprofen    Take over the counter medications for your symptoms, as directed by your doctor. You may also talk to your pharmacist.      If you have other health problems (like cancer, heart failure, an organ transplant or severe kidney disease): Call your specialty clinic if you don't feel better in the next 2 days.    Know when to call 911. Emergency warning signs include:  o Trouble breathing or shortness of breath  o Pain or pressure in the chest that doesn't go away  o Feeling confused like you haven't felt before, or not being able to wake up  o Bluish-colored lips or face    Where can I get more information?    [facility] - About COVID-19: [email or web address]     CDC - What to Do If You're Sick:   www.cdc.gov/coronavirus/2019-ncov/about/steps-when-sick.html    CDC - Ending Home Isolation:  https://www.cdc.gov/coronavirus/2019-ncov/your-health/quarantine-isolation.html    CDC - Caring for Someone:  www.cdc.gov/coronavirus/2019-ncov/if-you-are-sick/care-for-someone.html    [facility] clinical trials (COVID-19 research studies): clinicalaffairs.[facility]/n-clinical-trials    Below are the COVID-19 hotlines at the [facility] of Health ([facility]). Interpreters are available.  o For health questions: Call 342-575-5208 or 1-649.806.7111 (7 a.m. to 7 p.m.)  o For questions about schools and childcare: Call 863-538-9520 or 1-975.790.4128 (7 a.m. to 7 p.m.)  January 11, 2022  RE:  Alfredo Brewer                                                                                                                   [id number] [address] [address]      To whom it may concern:    I evaluated Alfredo Brewer on January 11, 2022. Alfredo Brewer should be excused from work/school.     They should let their workplace manager and staffing office know when their quarantine ends.    We can not give an exact date as it depends on the information below. They can calculate this on their own or work with their manager/staffing office to calculate this. (For example if they were exposed on 10/04, they would have to quarantine for 14 full days. That would be through 10/18. They could return on 10/19.)    Quarantine Guidelines:      If patient receives a positive COVID-19 test result, they should follow the guidance of those who are giving the results. Usually the return to work is 10 (or in some cases 20 days from symptom onset.) If they work at [employer], they must be cleared by Employee Occupational Health and Safety to return to work.        If patient receives a negative COVID-19 test result and did not have a high risk exposure to someone with a known positive COVID-19 test, they can return to work once they're free of fever for 24 hours without fever-reducing medication and their symptoms are improving or resolved.      If patient receives a negative COVID-19 test and if they had a high risk exposure to someone who has tested positive for COVID-19 then they can return to work 14 days after their last contact with the positive individual    Note: If there is ongoing exposure to the covid positive person, this quarantine period may be longer than 14 days. (For example, if they are continually exposed to their child, who tested positive and cannot isolate from them, then the quarantine of 7-14 days can't start until their child is no longer contagious. This is typically 10 days from onset to the child's symptoms. So the total duration may be 17-24 days in this case.)     Sincerely,  Mynor Solis MD          o

## 2022-01-24 ASSESSMENT — ENCOUNTER SYMPTOMS
SINUS PAIN: 0
DISTURBANCES IN COORDINATION: 1
TREMORS: 0
WEAKNESS: 0
SPEECH CHANGE: 0
DIARRHEA: 0
NAUSEA: 0
CONSTIPATION: 1
ABDOMINAL PAIN: 0
TASTE DISTURBANCE: 0
PARALYSIS: 0
SMELL DISTURBANCE: 0
SEIZURES: 0
RECTAL PAIN: 0
LOSS OF CONSCIOUSNESS: 0
VOMITING: 0
SINUS CONGESTION: 0
HEADACHES: 1
HEARTBURN: 0
JAUNDICE: 0
DIZZINESS: 1
TROUBLE SWALLOWING: 0
NECK MASS: 0
HOARSE VOICE: 0
SORE THROAT: 1
TINGLING: 0
NUMBNESS: 0
BOWEL INCONTINENCE: 0
MEMORY LOSS: 0
BLOOD IN STOOL: 0
BLOATING: 1

## 2022-01-26 ENCOUNTER — PRE VISIT (OUTPATIENT)
Dept: NEUROLOGY | Facility: CLINIC | Age: 42
End: 2022-01-26

## 2022-01-26 ENCOUNTER — OFFICE VISIT (OUTPATIENT)
Dept: NEUROLOGY | Facility: CLINIC | Age: 42
End: 2022-01-26
Attending: PEDIATRICS
Payer: COMMERCIAL

## 2022-01-26 VITALS
BODY MASS INDEX: 27.62 KG/M2 | DIASTOLIC BLOOD PRESSURE: 108 MMHG | HEART RATE: 104 BPM | WEIGHT: 179 LBS | OXYGEN SATURATION: 97 % | RESPIRATION RATE: 16 BRPM | SYSTOLIC BLOOD PRESSURE: 157 MMHG

## 2022-01-26 DIAGNOSIS — G43.109 MIGRAINE WITH TYPICAL AURA: Primary | ICD-10-CM

## 2022-01-26 DIAGNOSIS — R42 DIZZINESS: ICD-10-CM

## 2022-01-26 PROCEDURE — 99205 OFFICE O/P NEW HI 60 MIN: CPT | Performed by: PSYCHIATRY & NEUROLOGY

## 2022-01-26 RX ORDER — ALBUTEROL SULFATE 90 UG/1
1-2 AEROSOL, METERED RESPIRATORY (INHALATION) PRN
COMMUNITY
Start: 2021-08-05

## 2022-01-26 ASSESSMENT — PAIN SCALES - GENERAL: PAINLEVEL: NO PAIN (0)

## 2022-01-26 NOTE — LETTER
1/26/2022       RE: Alfredo Brewer  [address] [facility] [address]     Dear Colleague,    Thank you for referring your patient, Alfredo Brewer, to the [facility] NEUROLOGY CLIN[facility] [place] at [facility]. Please see a copy of my visit note below.    [facility] Neurology Consultation    Alfredo Brewer MRN# [id number]   Age: 41 year old YOB: 1980     Requesting physician: Robin Johnson     Reason for Consultation: migraine      History of Presenting Symptoms:   Alfredo Brewer is a 41 year old male who presents today for evaluation of migraine.  The patient has a pertinent medical history childhood acute myeloid leukemia  (Dx 8/1980, therapies completed 1983, Chemo: doxorubicin, cycophosphamide, 6-Azacytidine, Methotrexate, Carmustine, 6-Mercaptopurine, Vincisint, Cytarabine. Rads: whole brain 1800 cGy 1980).  The patient had a neurology referral placed by his oncologist for migraine management as well as peripheral neuropathy 5/2021.    Upon chart review, the patient was seen with PM&R provider, Dr. Valadez, 6/2021, and reported neuropathy of his trunk to knees and into his feet since 2007, as well as low back pain.  He also had a diagnosis of herniated disc at C5-6 in 2007 requring epidural steroid injections, PT, traction.  The patient also reported he had a migraine history, and was taking amitriptyline and gabapentin in the past for both his neuropathy and as a migraine prophylactic.  It was reported he had an EMG in 2009 which showed no evidence for neuropathy. Lumbar MRI, serum studies for neuropathy, and EMG were to be done given a reported finding of new RLE weakness.    The patient was seen with a neurologist, Dr. Pelletier of [facility], in 8/15/2007 for chronic daily headache with rebound worsened by anxiety/depression.  He was then seen with Dr. Fischer of [facility] 8/14/2009 and 9/2/2009 for leg discomfort,  dysesthesias, which were mostly at his posterior thighs and upper 1/3 of his calves. He was to trial nortriptyline for his symptoms.    Today, the patient describes that his primary concern is that of vertigo occurring since 2011.  He has two types. He gets dizzy when he sits up in the AM (lays on his side, sits up) or when he is bending over and stands up quickly.  The dizziness is a general feeling of his head is bobbling and his head is cloudy.  His other event is that of a room spinning sensation when he wakes up, occurring about 1-2 times per month. He gets nauseated with the severe spinning episode and it is helped with meclizine.  He has [facility]  Recent audiology evaluation 9/17/2021 indicated both central and peripheral dysfunction and recommendations for vestibular rehab were made.    Another issue the patient wishes to discuss is migraines occurring since 2008, happening once a month, and for which he takes amitriptyline.  Before being diagnosed with migraines, he does indicate having left cheek numbness prior to his migraine/headache onset (noted with 2007 [facility] evaluation).  Otherwise he has no weakness or sensory loss beyond his migraines.      When describing his neuropathy, he actually states b/l tightness and pain in his b/l buttocks and thighs (as mentioned in PM&R evaluation 2009). There is no longstanding numbness/parethesias.  His events often occur during high periods of stress.  He doesn't note any lack of weakness.      Social History:   Drinks 8-10 alcoholic beverages a week.  He drinks an energy drink or coffee once per day.     Medications:     Current Outpatient Medications   Medication Sig     AMITRIPTYLINE HCL PO Take by mouth At Bedtime     FISH OIL-VITAMIN D PO      lactobacillus rhamnosus, GG, (CULTURELL) capsule Take 1 capsule by mouth daily     meclizine (ANTIVERT) 25 MG tablet Take 1 tablet (25 mg)  by mouth every 6 hours as needed for dizziness     Omeprazole (PRILOSEC PO) Take by mouth every morning     rosuvastatin (CRESTOR) 20 MG tablet Take 20 mg by mouth     triamterene-HCTZ (DYAZIDE) 37.5-25 MG capsule Take 1 capsule by mouth daily      Physical Exam:   Vitals: BP (!) 157/108   Pulse 104   Resp 16   Wt 81.2 kg (179 lb)   SpO2 97%   BMI 27.62 kg/m     General: Seated comfortably in no acute distress.  HEENT: Neck supple with normal range of motion. No paracervical muscle tenderness or tightness.    Skin: No rashes  Neurologic:     Mental Status: Fully alert, attentive and oriented. Speech clear and fluent, no paraphasic errors. MiniCog score of 5/5     Cranial Nerves: Visual fields intact. PERRL. EOMI with normal smooth pursuit. Facial sensation intact/symmetric. Facial movements symmetric. Hearing not formally tested but intact to conversation. Palate elevation symmetric, uvula midline. No dysarthria. Shoulder shrug strong bilaterally. Tongue protrusion midline.     Motor: No tremors or other abnormal movements observed. Muscle tone normal throughout. No pronator drift. Normal/symmetric rapid finger tapping. Strength 5/5 throughout upper and lower extremities.     Deep Tendon Reflexes: 1+/symmetric throughout upper and lower extremities. No clonus. Toes downgoing bilaterally.     Sensory: Intact/symmetric to light touch, pinprick, temperature, vibration (full 20+ seconds in finger and distal toes) and proprioception throughout upper and lower extremities. Negative Romberg.      Coordination: Finger-nose-finger and heel-shin intact without dysmetria. Rapid alternating movements intact/symmetric with normal speed and rhythm.     Gait: Normal, steady casual gait. Able to walk on toes, heels and tandem without difficulty.         Data: Pertinent prior to visit   Imaging:  MRI cervical spine: 3/9/2019  IMPRESSION:   1. There is degenerative change with right paracentral disc protrusion with annular tear  slightly flattening the right hemicord at the C5-6 level. There may be some slight mass effect on the associated right nerve root.   2. At the C6-7 level there is probable moderate central spinal stenosis with potential moderately severe bilateral neural foraminal narrowing. Other degenerative changes are as described above.    MRI brain: 3/8/2011  FINDINGS: Comparison is made with the patient's previous MRI of the   brain from 09/03/2009.  There is no definite evidence of an acute   infarct or cytotoxic edema on the diffusion images.  Partially empty   sella is noted.  Probable small to moderate sized incidental   arachnoid cyst is seen within the superior vermian cistern.  Small   subdural hygromas or extension of the arachnoid cyst within the   superior vermian cistern is seen over the tentorium.  This is best   appreciated on the postcontrast coronal images.  This appears   unchanged in appearance.  Probable small incidental arachnoid cysts   are seen over the frontoparietal convexities.  Mild mucosal membrane   thickening is seen within the paranasal sinuses.  The mastoid air   cells appear unopacified.  Normal flow-voids are seen within the   major arterial and venous structures.  No definite signal   abnormalities are seen involving the brain parenchyma on the T2 and   FLAIR sequences.  The post contrast images appear unremarkable.  A   defect is seen within the scalp within the frontal area on the left.   This appears unchanged.  This may be related to a previous injury.    MRI lumbar spine: 2/7/2009  IMPRESSION:  1. No significant spinal canal or neural foraminal stenosis.  2. Scattered foci of fatty marrow infiltration seen throughout the lumbar spine and sacrum.    Procedures:  EMG 9/1/2009: [facility]  ASSESSMENT:   ICD-9: 729.5 This electrodiagnostic study is normal in both legs with the  exception of a absent H. reflexes of uncertain significance. The study fails  to define motor radiculopathies,  "plexopathy or peripheral neuropathy.. The  cause of the patient's complaints are therefore not definitively defined by  this study. Patient counseled regarding test results. Formal NCS - EMG  report available in [employer].    9/17/2021:  ASSESSMENT:  1. Indications of central vestibular system involvement noted on today's exam were as follows:   -Left and up beating nystagmus (10 & 4 degrees/s), decreasing but unable to fully suppress with fixation, evident in Body Left Positional and at a lower degree (1-2 degrees/s) in Roll Test Head Left.  -Mild right mixed with down rotary nystagmus (2 degrees/s), decreasing but unable to fully suppress with fixation, evident in Pre-Caloric Positional.  2. Indications of peripheral vestibular system involvement noted on today's exam were as follows:   -Positive Right Head Thrust and Positive Horizontal High Frequency Headshake.    -Abnormal Calorics: Bithermal water irrigations revealed a significant 62% right hypofunction.  -Rotary Chair testing: pattern suggests a previous peripheral lesion and a compensated peripheral vestibular system.  -Possible Positive for Left posterior canal benign paroxysmal positional vertigo (PC BPPV); see \"[place]-Hallpike and Roll Test\" section above.  PLAN:  Recommend referral to vestibular physical therapy for BPPV recheck pending further review by the referring provider. Proceed with hearing evaluation as scheduled on 9/21/2021.          Assessment and Plan:   Assessment:  Hx of whole brain radiation  Central and peripheral vertigo  Migraine w/out aura, well controlled    The patient has a relatively normal exam, and I do not think he has an active neuropathy present.  His b/l leg symptoms sound more akin to a stress reaction versus localized radiculopathy/lumbar stenosis/myelopathic issue.  His cognitive screen today and his general function in life is not necessarily concerning for radiation induced dementia, and I would not necessarily recommend " neuropsychological testing at this time.  Given his ongoing but longstanding central and peripheral vertigo without association with migraine or other brainstem dysfunction, I would ask that he obtain an MRI brain given his cancer history and elevated blood pressures, which could lead to a TIA of the brain-stem that may be misinterpreted by the patient as chronic vestibular symptoms.  He should continue to follow with a vestibular therapist otherwise.  His migraine is well managed at this time with minimal dosing of amitriptyline and I wouldn't necessarily recommend any changes to his regimen unless his symptoms changed in presentation/severity/frequency.     Plan:  - MRI brain w/wout  - Vestibular therapy should continue  - Can continue with amitriptyline and meclizine PRN    Follow up in Neurology clinic in 1 year or should new concerns arise.    NISREEN Sherman D.O.   of Neurology    Total time  today (70 min) in this patient encounter was spent on pre-charting, counseling and/or coordination of care. We reviewed diagnostic results, impressions, and discussed other possible tests if symptoms do not improve. We discussed the implications of the diagnosis, as well as risks and benefits of management options. We reviewed treatment instructions and our scheduled follow-up as specified in the discharge plan. We also discussed the importance of compliance with the chosen course of treatment. The patient is in agreement with this plan and has no further questions.        Again, thank you for allowing me to participate in the care of your patient.      Sincerely,    Claude Sherman, DO

## 2022-01-26 NOTE — PROGRESS NOTES
[facility] Neurology Consultation    Alfredo Brewer MRN# [id number]   Age: 41 year old YOB: 1980     Requesting physician: Robin Johnson     Reason for Consultation: migraine      History of Presenting Symptoms:   Alfredo Brewer is a 41 year old male who presents today for evaluation of migraine.  The patient has a pertinent medical history childhood acute myeloid leukemia  (Dx 8/1980, therapies completed 1983, Chemo: doxorubicin, cycophosphamide, 6-Azacytidine, Methotrexate, Carmustine, 6-Mercaptopurine, Vincisint, Cytarabine. Rads: whole brain 1800 cGy 1980).  The patient had a neurology referral placed by his oncologist for migraine management as well as peripheral neuropathy 5/2021.    Upon chart review, the patient was seen with PM&R provider, Dr. Valadez, 6/2021, and reported neuropathy of his trunk to knees and into his feet since 2007, as well as low back pain.  He also had a diagnosis of herniated disc at C5-6 in 2007 requring epidural steroid injections, PT, traction.  The patient also reported he had a migraine history, and was taking amitriptyline and gabapentin in the past for both his neuropathy and as a migraine prophylactic.  It was reported he had an EMG in 2009 which showed no evidence for neuropathy. Lumbar MRI, serum studies for neuropathy, and EMG were to be done given a reported finding of new RLE weakness.    The patient was seen with a neurologist, Dr. Pelletier of [facility], in 8/15/2007 for chronic daily headache with rebound worsened by anxiety/depression.  He was then seen with Dr. Fischer of [facility] 8/14/2009 and 9/2/2009 for leg discomfort, dysesthesias, which were mostly at his posterior thighs and upper 1/3 of his calves. He was to trial nortriptyline for his symptoms.    Today, the patient describes that his primary concern is that of vertigo occurring since 2011.  He has two types. He gets dizzy when he sits up in the AM (lays on his side, sits up)  or when he is bending over and stands up quickly.  The dizziness is a general feeling of his head is bobbling and his head is cloudy.  His other event is that of a room spinning sensation when he wakes up, occurring about 1-2 times per month. He gets nauseated with the severe spinning episode and it is helped with meclizine.  He has [facility]  Recent audiology evaluation 9/17/2021 indicated both central and peripheral dysfunction and recommendations for vestibular rehab were made.    Another issue the patient wishes to discuss is migraines occurring since 2008, happening once a month, and for which he takes amitriptyline.  Before being diagnosed with migraines, he does indicate having left cheek numbness prior to his migraine/headache onset ([facility]  Otherwise he has no weakness or sensory loss beyond his migraines.      When describing his neuropathy, he actually states b/l tightness and pain in his b/l buttocks and thighs (as mentioned in PM&R evaluation 2009). There is no longstanding numbness/parethesias.  His events often occur during high periods of stress.  He doesn't note any lack of weakness.      Social History:   Drinks 8-10 alcoholic beverages a week.  He drinks an energy drink or coffee once per day.     Medications:     Current Outpatient Medications   Medication Sig     AMITRIPTYLINE HCL PO Take by mouth At Bedtime     FISH OIL-VITAMIN D PO      lactobacillus rhamnosus, GG, (CULTURELL) capsule Take 1 capsule by mouth daily     meclizine (ANTIVERT) 25 MG tablet Take 1 tablet (25 mg) by mouth every 6 hours as needed for dizziness     Omeprazole (PRILOSEC PO) Take by mouth every morning     rosuvastatin (CRESTOR) 20 MG tablet Take 20 mg by mouth     triamterene-HCTZ (DYAZIDE) 37.5-25 MG capsule Take 1 capsule by mouth daily      Physical Exam:   Vitals: BP (!) 157/108   Pulse 104   Resp 16   Wt  81.2 kg (179 lb)   SpO2 97%   BMI 27.62 kg/m     General: Seated comfortably in no acute distress.  HEENT: Neck supple with normal range of motion. No paracervical muscle tenderness or tightness.    Skin: No rashes  Neurologic:     Mental Status: Fully alert, attentive and oriented. Speech clear and fluent, no paraphasic errors. MiniCog score of 5/5     Cranial Nerves: Visual fields intact. PERRL. EOMI with normal smooth pursuit. Facial sensation intact/symmetric. Facial movements symmetric. Hearing not formally tested but intact to conversation. Palate elevation symmetric, uvula midline. No dysarthria. Shoulder shrug strong bilaterally. Tongue protrusion midline.     Motor: No tremors or other abnormal movements observed. Muscle tone normal throughout. No pronator drift. Normal/symmetric rapid finger tapping. Strength 5/5 throughout upper and lower extremities.     Deep Tendon Reflexes: 1+/symmetric throughout upper and lower extremities. No clonus. Toes downgoing bilaterally.     Sensory: Intact/symmetric to light touch, pinprick, temperature, vibration (full 20+ seconds in finger and distal toes) and proprioception throughout upper and lower extremities. Negative Romberg.      Coordination: Finger-nose-finger and heel-shin intact without dysmetria. Rapid alternating movements intact/symmetric with normal speed and rhythm.     Gait: Normal, steady casual gait. Able to walk on toes, heels and tandem without difficulty.         Data: Pertinent prior to visit   Imaging:  MRI cervical spine: 3/9/2019  IMPRESSION:   1. There is degenerative change with right paracentral disc protrusion with annular tear slightly flattening the right hemicord at the C5-6 level. There may be some slight mass effect on the associated right nerve root.   2. At the C6-7 level there is probable moderate central spinal stenosis with potential moderately severe bilateral neural foraminal narrowing. Other degenerative changes are as described  above.    MRI brain: 3/8/2011  FINDINGS: Comparison is made with the patient's previous MRI of the   brain from 09/03/2009.  There is no definite evidence of an acute   infarct or cytotoxic edema on the diffusion images.  Partially empty   sella is noted.  Probable small to moderate sized incidental   arachnoid cyst is seen within the superior vermian cistern.  Small   subdural hygromas or extension of the arachnoid cyst within the   superior vermian cistern is seen over the tentorium.  This is best   appreciated on the postcontrast coronal images.  This appears   unchanged in appearance.  Probable small incidental arachnoid cysts   are seen over the frontoparietal convexities.  Mild mucosal membrane   thickening is seen within the paranasal sinuses.  The mastoid air   cells appear unopacified.  Normal flow-voids are seen within the   major arterial and venous structures.  No definite signal   abnormalities are seen involving the brain parenchyma on the T2 and   FLAIR sequences.  The post contrast images appear unremarkable.  A   defect is seen within the scalp within the frontal area on the left.   This appears unchanged.  This may be related to a previous injury.    MRI lumbar spine: 2/7/2009  IMPRESSION:  1. No significant spinal canal or neural foraminal stenosis.  2. Scattered foci of fatty marrow infiltration seen throughout the lumbar spine and sacrum.    Procedures:  [facility] 9/1/2009: [facility]  ASSESSMENT:   ICD-9: 729.5 This electrodiagnostic study is normal in both legs with the  exception of a absent H. reflexes of uncertain significance. The study fails  to define motor radiculopathies, plexopathy or peripheral neuropathy.. The  cause of the patient's complaints are therefore not definitively defined by  this study. Patient counseled regarding test results. Formal NCS - EMG  report available in [employer].    9/17/2021:  ASSESSMENT:  1. Indications of central vestibular system involvement noted on  "today's exam were as follows:   -Left and up beating nystagmus (10 & 4 degrees/s), decreasing but unable to fully suppress with fixation, evident in Body Left Positional and at a lower degree (1-2 degrees/s) in Roll Test Head Left.  -Mild right mixed with down rotary nystagmus (2 degrees/s), decreasing but unable to fully suppress with fixation, evident in Pre-Caloric Positional.  2. Indications of peripheral vestibular system involvement noted on today's exam were as follows:   -Positive Right Head Thrust and Positive Horizontal High Frequency Headshake.    -Abnormal Calorics: Bithermal water irrigations revealed a significant 62% right hypofunction.  -Rotary Chair testing: pattern suggests a previous peripheral lesion and a compensated peripheral vestibular system.  -Possible Positive for Left posterior canal benign paroxysmal positional vertigo (PC BPPV); see \"[place]-Hallpike and Roll Test\" section above.  PLAN:  Recommend referral to vestibular physical therapy for BPPV recheck pending further review by the referring provider. Proceed with hearing evaluation as scheduled on 9/21/2021.          Assessment and Plan:   Assessment:  Hx of whole brain radiation  Central and peripheral vertigo  Migraine w/out aura, well controlled    The patient has a relatively normal exam, and I do not think he has an active neuropathy present.  His b/l leg symptoms sound more akin to a stress reaction versus localized radiculopathy/lumbar stenosis/myelopathic issue.  His cognitive screen today and his general function in life is not necessarily concerning for radiation induced dementia, and I would not necessarily recommend neuropsychological testing at this time.  Given his ongoing but longstanding central and peripheral vertigo without association with migraine or other brainstem dysfunction, I would ask that he obtain an MRI brain given his cancer history and elevated blood pressures, which could lead to a TIA of the brain-stem " that may be misinterpreted by the patient as chronic vestibular symptoms.  He should continue to follow with a vestibular therapist otherwise.  His migraine is well managed at this time with minimal dosing of amitriptyline and I wouldn't necessarily recommend any changes to his regimen unless his symptoms changed in presentation/severity/frequency.     Plan:  - MRI brain w/wout  - Vestibular therapy should continue  - Can continue with amitriptyline and meclizine PRN    Follow up in Neurology clinic in 1 year or should new concerns arise.    NISREEN Sherman D.O.   of Neurology    Total time  today (70 min) in this patient encounter was spent on pre-charting, counseling and/or coordination of care. We reviewed diagnostic results, impressions, and discussed other possible tests if symptoms do not improve. We discussed the implications of the diagnosis, as well as risks and benefits of management options. We reviewed treatment instructions and our scheduled follow-up as specified in the discharge plan. We also discussed the importance of compliance with the chosen course of treatment. The patient is in agreement with this plan and has no further questions.

## 2022-01-26 NOTE — PATIENT INSTRUCTIONS
I will order a brain MRI to trend your prior whole brain radiation treatment and see if there is any changes compared to a decade ago, and/or if any new issues have occurred (stroke in the brainstem, regions of injury).    Otherwise I have no further recommendations regarding your dizziness which at this time is well investigated.      I also do not feel strongly that you have a neuropathy at this time given your exam today and prior EMG findings.

## 2022-01-26 NOTE — LETTER
1/26/2022      RE: Alfredo Brewer  [address] [facility] [address]       [facility] Neurology Consultation    Alfredo Brewer MRN# [id number]   Age: 41 year old YOB: 1980     Requesting physician: Robin Johnson     Reason for Consultation: migraine      History of Presenting Symptoms:   Alfredo Brewer is a 41 year old male who presents today for evaluation of migraine.  The patient has a pertinent medical history childhood acute myeloid leukemia  (Dx 8/1980, therapies completed 1983, Chemo: doxorubicin, cycophosphamide, 6-Azacytidine, Methotrexate, Carmustine, 6-Mercaptopurine, Vincisint, Cytarabine. Rads: whole brain 1800 cGy 1980).  The patient had a neurology referral placed by his oncologist for migraine management as well as peripheral neuropathy 5/2021.    Upon chart review, the patient was seen with PM&R provider, Dr. Valadez, 6/2021, and reported neuropathy of his trunk to knees and into his feet since 2007, as well as low back pain.  He also had a diagnosis of herniated disc at C5-6 in 2007 requring epidural steroid injections, PT, traction.  The patient also reported he had a migraine history, and was taking amitriptyline and gabapentin in the past for both his neuropathy and as a migraine prophylactic.  It was reported he had an EMG in 2009 which showed no evidence for neuropathy. Lumbar MRI, serum studies for neuropathy, and EMG were to be done given a reported finding of new RLE weakness.    The patient was seen with a neurologist, Dr. Pelletier of [facility], in 8/15/2007 for chronic daily headache with rebound worsened by anxiety/depression.  He was then seen with Dr. Fischer of [facility] 8/14/2009 and 9/2/2009 for leg discomfort, dysesthesias, which were mostly at his posterior thighs and upper 1/3 of his calves. He was to trial nortriptyline for his symptoms.    Today, the patient describes that his primary concern is that of vertigo occurring since 2011.  He  has two types. He gets dizzy when he sits up in the AM (lays on his side, sits up) or when he is bending over and stands up quickly.  The dizziness is a general feeling of his head is bobbling and his head is cloudy.  His other event is that of a room spinning sensation when he wakes up, occurring about 1-2 times per month. He gets nauseated with the severe spinning episode and it is helped with meclizine.  He has attended [facility] the [facility]  Recent audiology evaluation 9/17/2021 indicated both central and peripheral dysfunction and recommendations for vestibular rehab were made.    Another issue the patient wishes to discuss is migraines occurring since 2008, happening once a month, and for which he takes amitriptyline.  Before being diagnosed with migraines, he does indicate having left cheek numbness prior to his migraine/headache onset (noted with 2007 [facility] evaluation).  Otherwise he has no weakness or sensory loss beyond his migraines.      When describing his neuropathy, he actually states b/l tightness and pain in his b/l buttocks and thighs (as mentioned in PM&R evaluation 2009). There is no longstanding numbness/parethesias.  His events often occur during high periods of stress.  He doesn't note any lack of weakness.      Social History:   Drinks 8-10 alcoholic beverages a week.  He drinks an energy drink or coffee once per day.     Medications:     Current Outpatient Medications   Medication Sig     AMITRIPTYLINE HCL PO Take by mouth At Bedtime     FISH OIL-VITAMIN D PO      lactobacillus rhamnosus, GG, (CULTURELL) capsule Take 1 capsule by mouth daily     meclizine (ANTIVERT) 25 MG tablet Take 1 tablet (25 mg) by mouth every 6 hours as needed for dizziness     Omeprazole (PRILOSEC PO) Take by mouth every morning     rosuvastatin (CRESTOR) 20 MG tablet Take 20 mg by mouth     triamterene-HCTZ (DYAZIDE) 37.5-25 MG capsule Take 1 capsule by  mouth daily      Physical Exam:   Vitals: BP (!) 157/108   Pulse 104   Resp 16   Wt 81.2 kg (179 lb)   SpO2 97%   BMI 27.62 kg/m     General: Seated comfortably in no acute distress.  HEENT: Neck supple with normal range of motion. No paracervical muscle tenderness or tightness.    Skin: No rashes  Neurologic:     Mental Status: Fully alert, attentive and oriented. Speech clear and fluent, no paraphasic errors. MiniCog score of 5/5     Cranial Nerves: Visual fields intact. PERRL. EOMI with normal smooth pursuit. Facial sensation intact/symmetric. Facial movements symmetric. Hearing not formally tested but intact to conversation. Palate elevation symmetric, uvula midline. No dysarthria. Shoulder shrug strong bilaterally. Tongue protrusion midline.     Motor: No tremors or other abnormal movements observed. Muscle tone normal throughout. No pronator drift. Normal/symmetric rapid finger tapping. Strength 5/5 throughout upper and lower extremities.     Deep Tendon Reflexes: 1+/symmetric throughout upper and lower extremities. No clonus. Toes downgoing bilaterally.     Sensory: Intact/symmetric to light touch, pinprick, temperature, vibration (full 20+ seconds in finger and distal toes) and proprioception throughout upper and lower extremities. Negative Romberg.      Coordination: Finger-nose-finger and heel-shin intact without dysmetria. Rapid alternating movements intact/symmetric with normal speed and rhythm.     Gait: Normal, steady casual gait. Able to walk on toes, heels and tandem without difficulty.         Data: Pertinent prior to visit   Imaging:  MRI cervical spine: 3/9/2019  IMPRESSION:   1. There is degenerative change with right paracentral disc protrusion with annular tear slightly flattening the right hemicord at the C5-6 level. There may be some slight mass effect on the associated right nerve root.   2. At the C6-7 level there is probable moderate central spinal stenosis with potential moderately  severe bilateral neural foraminal narrowing. Other degenerative changes are as described above.    MRI brain: 3/8/2011  FINDINGS: Comparison is made with the patient's previous MRI of the   brain from 09/03/2009.  There is no definite evidence of an acute   infarct or cytotoxic edema on the diffusion images.  Partially empty   sella is noted.  Probable small to moderate sized incidental   arachnoid cyst is seen within the superior vermian cistern.  Small   subdural hygromas or extension of the arachnoid cyst within the   superior vermian cistern is seen over the tentorium.  This is best   appreciated on the postcontrast coronal images.  This appears   unchanged in appearance.  Probable small incidental arachnoid cysts   are seen over the frontoparietal convexities.  Mild mucosal membrane   thickening is seen within the paranasal sinuses.  The mastoid air   cells appear unopacified.  Normal flow-voids are seen within the   major arterial and venous structures.  No definite signal   abnormalities are seen involving the brain parenchyma on the T2 and   FLAIR sequences.  The post contrast images appear unremarkable.  A   defect is seen within the scalp within the frontal area on the left.   This appears unchanged.  This may be related to a previous injury.    MRI lumbar spine: 2/7/2009  IMPRESSION:  1. No significant spinal canal or neural foraminal stenosis.  2. Scattered foci of fatty marrow infiltration seen throughout the lumbar spine and sacrum.    Procedures:  EMG 9/1/2009: [facility]  ASSESSMENT:   ICD-9: 729.5 This electrodiagnostic study is normal in both legs with the  exception of a absent H. reflexes of uncertain significance. The study fails  to define motor radiculopathies, plexopathy or peripheral neuropathy.. The  cause of the patient's complaints are therefore not definitively defined by  this study. Patient counseled regarding test results. Formal NCS - EMG  report available in  "[employer].    9/17/2021:  ASSESSMENT:  1. Indications of central vestibular system involvement noted on today's exam were as follows:   -Left and up beating nystagmus (10 & 4 degrees/s), decreasing but unable to fully suppress with fixation, evident in Body Left Positional and at a lower degree (1-2 degrees/s) in Roll Test Head Left.  -Mild right mixed with down rotary nystagmus (2 degrees/s), decreasing but unable to fully suppress with fixation, evident in Pre-Caloric Positional.  2. Indications of peripheral vestibular system involvement noted on today's exam were as follows:   -Positive Right Head Thrust and Positive Horizontal High Frequency Headshake.    -Abnormal Calorics: Bithermal water irrigations revealed a significant 62% right hypofunction.  -Rotary Chair testing: pattern suggests a previous peripheral lesion and a compensated peripheral vestibular system.  -Possible Positive for Left posterior canal benign paroxysmal positional vertigo (PC BPPV); see \"Rody-Hallpike and Roll Test\" section above.  PLAN:  Recommend referral to vestibular physical therapy for BPPV recheck pending further review by the referring provider. Proceed with hearing evaluation as scheduled on 9/21/2021.          Assessment and Plan:   Assessment:  Hx of whole brain radiation  Central and peripheral vertigo  Migraine w/out aura, well controlled    The patient has a relatively normal exam, and I do not think he has an active neuropathy present.  His b/l leg symptoms sound more akin to a stress reaction versus localized radiculopathy/lumbar stenosis/myelopathic issue.  His cognitive screen today and his general function in life is not necessarily concerning for radiation induced dementia, and I would not necessarily recommend neuropsychological testing at this time.  Given his ongoing but longstanding central and peripheral vertigo without association with migraine or other brainstem dysfunction, I would ask that he obtain an MRI brain " given his cancer history and elevated blood pressures, which could lead to a TIA of the brain-stem that may be misinterpreted by the patient as chronic vestibular symptoms.  He should continue to follow with a vestibular therapist otherwise.  His migraine is well managed at this time with minimal dosing of amitriptyline and I wouldn't necessarily recommend any changes to his regimen unless his symptoms changed in presentation/severity/frequency.     Plan:  - MRI brain w/wout  - Vestibular therapy should continue  - Can continue with amitriptyline and meclizine PRN    Follow up in Neurology clinic in 1 year or should new concerns arise.    NISREEN Sherman D.O.   of Neurology    Total time  today (70 min) in this patient encounter was spent on pre-charting, counseling and/or coordination of care. We reviewed diagnostic results, impressions, and discussed other possible tests if symptoms do not improve. We discussed the implications of the diagnosis, as well as risks and benefits of management options. We reviewed treatment instructions and our scheduled follow-up as specified in the discharge plan. We also discussed the importance of compliance with the chosen course of treatment. The patient is in agreement with this plan and has no further questions.

## 2022-02-09 ENCOUNTER — HOSPITAL ENCOUNTER (OUTPATIENT)
Dept: MRI IMAGING | Facility: CLINIC | Age: 42
Discharge: HOME | End: 2022-02-09
Attending: PSYCHIATRY & NEUROLOGY | Admitting: PSYCHIATRY & NEUROLOGY
Payer: COMMERCIAL

## 2022-02-09 DIAGNOSIS — G43.109 MIGRAINE WITH TYPICAL AURA: ICD-10-CM

## 2022-02-09 DIAGNOSIS — R42 DIZZINESS: ICD-10-CM

## 2022-02-09 PROCEDURE — 70553 MRI BRAIN STEM W/O & W/DYE: CPT

## 2022-02-09 PROCEDURE — 255N000002 HC RX 255 OP 636: Performed by: PSYCHIATRY & NEUROLOGY

## 2022-02-09 PROCEDURE — A9585 GADOBUTROL INJECTION: HCPCS | Performed by: PSYCHIATRY & NEUROLOGY

## 2022-02-09 RX ORDER — GADOBUTROL 604.72 MG/ML
10 INJECTION INTRAVENOUS ONCE
Status: COMPLETED | OUTPATIENT
Start: 2022-02-09 | End: 2022-02-09

## 2022-02-09 RX ADMIN — GADOBUTROL 8 ML: 604.72 INJECTION INTRAVENOUS at 07:12

## 2022-02-12 ENCOUNTER — HEALTH MAINTENANCE LETTER (OUTPATIENT)
Age: 42
End: 2022-02-12

## 2022-03-01 DIAGNOSIS — E23.7 PITUITARY ABNORMALITY (H): Primary | ICD-10-CM

## 2022-05-04 DIAGNOSIS — Z92.21 STATUS POST CHEMOTHERAPY: ICD-10-CM

## 2022-05-04 DIAGNOSIS — C92.01 AML (ACUTE MYELOID LEUKEMIA) IN REMISSION (H): ICD-10-CM

## 2022-05-04 DIAGNOSIS — Z85.6 HISTORY OF MYELOID LEUKEMIA: Primary | ICD-10-CM

## 2022-05-04 NOTE — PROGRESS NOTES
Order-only encounter to place cardio-oncology referral and echo order (see [employer] messages from 5/22).

## 2022-05-09 ASSESSMENT — ENCOUNTER SYMPTOMS
MEMORY LOSS: 0
NIGHT SWEATS: 0
POLYPHAGIA: 0
WEIGHT GAIN: 1
FATIGUE: 1
SKIN CHANGES: 0
SEIZURES: 0
SYNCOPE: 0
LIGHT-HEADEDNESS: 0
BLOATING: 1
MYALGIAS: 0
STIFFNESS: 0
JAUNDICE: 0
LOSS OF CONSCIOUSNESS: 0
RECTAL PAIN: 0
ORTHOPNEA: 0
HYPERTENSION: 1
HYPOTENSION: 0
HALLUCINATIONS: 0
POOR WOUND HEALING: 0
LEG PAIN: 0
ABDOMINAL PAIN: 0
HEADACHES: 1
INCREASED ENERGY: 0
WEAKNESS: 0
BOWEL INCONTINENCE: 0
ARTHRALGIAS: 0
NAUSEA: 0
CHILLS: 0
VOMITING: 0
SLEEP DISTURBANCES DUE TO BREATHING: 0
DECREASED APPETITE: 0
MUSCLE CRAMPS: 0
DIARRHEA: 0
FEVER: 0
PALPITATIONS: 0
DISTURBANCES IN COORDINATION: 0
CONSTIPATION: 1
BLOOD IN STOOL: 0
TINGLING: 0
SPEECH CHANGE: 0
WEIGHT LOSS: 0
PARALYSIS: 0
BACK PAIN: 0
DIZZINESS: 1
TREMORS: 0
ALTERED TEMPERATURE REGULATION: 0
HEARTBURN: 1
NECK PAIN: 1
EXERCISE INTOLERANCE: 0
JOINT SWELLING: 0
NAIL CHANGES: 0
NUMBNESS: 0
POLYDIPSIA: 0
MUSCLE WEAKNESS: 0

## 2022-05-10 ENCOUNTER — VIRTUAL VISIT (OUTPATIENT)
Dept: ENDOCRINOLOGY | Facility: CLINIC | Age: 42
End: 2022-05-10
Attending: PSYCHIATRY & NEUROLOGY
Payer: COMMERCIAL

## 2022-05-10 DIAGNOSIS — E23.7 PITUITARY ABNORMALITY (H): Primary | ICD-10-CM

## 2022-05-10 PROCEDURE — 99205 OFFICE O/P NEW HI 60 MIN: CPT | Mod: GT | Performed by: INTERNAL MEDICINE

## 2022-05-10 NOTE — LETTER
"    5/10/2022         RE: Alfredo Brewer  [address] [facility] [address]        Dear Colleague,    Thank you for referring your patient, Alfredo Brewer, to the [facility] [place]. Please see a copy of my visit note below.      Video-Visit Details    Type of service:  Video Visit  Video Start Time: 9:10  Video End Time:9:53  Originating Location (pt. Location): [place], [place]  Distant Location (provider location):  Home  Platform used for Video Visit: Zoom    Bettie Vega MD      Alfredo Brewer is a 42 year old yo male who presents today via billable video visit for evaluation of pituitary abnormality. OF note, he has past medical history of childhood adult leukemia s/p chemo/XRT in remission since age 3, also vertigo, chronic headaches, HLD.  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 Now following with Dr Pimentel oncology for monitoring/maintenance.   At that time, received chemotherapy, brain radiation and now off treatment for >40 years. Monitoring completed     HPA-  Notes some vertigo like symptoms (10-11 years), fatigue/lethergy especially in the morning (able to complete all ADLs and iADLs, denies nausea/vomiting, denies weight loss/weight gain (weight stable), denies sugar/salt craving (occasional salt craving nothing profound)   - Vertigo first thin in AM, as roll over in bed put one foot on floor and feels \"off\", treats with meclizine, worse during periods of high stress, been evaluated in [facility] center for this. Has two different feelings of dizziness- bobblehead feeling vs severe vertigo (requires bed rest x2 days)  HPT- notes some dry/brittle nails on hands, eczema on hands (daily topical treatment), notes constipation (irregular off/on since youth), fatigue, notes occasional cold intolerance. 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 Drinks 24oz x4-5 during work day  GH- notes some fatigue.   Prolactin- notes some fatigue    Denies vision changes including blurry vision, double vision or change in peripheral vision. Notes cranial headaches since 2007 takes amitriptyline.       August 23, 2019 ([facility] testing, Care[facility]where)  Prolactin 5.2  Testosterone Total 304 (200-745)  Free Testosterone 9.1 (3.1-12.8)  Bioavailable Testo 214.0 (71.7-300.0)  SHBG 13 (16-70)  LH- 3  Estradiol- 13  FSH- 7.7    5/7/2019  Testosterone 172        Active diagnoses this visit:  Pituitary abnormality (H)     ROS: 10 point ROS neg other than the symptoms noted above in the HPI.      Medical, surgical, social, and family histories, medications and allergies reviewed and updated.    Objective:  Physical Exam (visual exam)  VS:  no vital signs taken for video visit  CONSTITUTIONAL: healthy, alert and NAD, responding appropriately  ENT: normocephalic, no visual evidence of trauma, normal nose and oral mucosa  EYES: conjunctivae and " sclerae normal, no exophthalmos or proptosis  THYROID:  no visualized nodules or goiter  LUNGS: no audible wheeze, cough or visible cyanosis, no visible retractions or increased work of breathing  EXTREMITIES: no hand tremors  NEUROLOGY: cranial nerves grossly intact with no obvious deficit.  SKIN:  no visualized skin lesions or rash, no edema visualized  PSYCH: mentation appears normal, normal judgement        Lab Results   Component Value Date/Time    TSH 1.93 06/03/2021 07:40 AM       Last Comprehensive Metabolic Panel:  Sodium   Date Value Ref Range Status   06/03/2021 135 133 - 144 mmol/L Final     Potassium   Date Value Ref Range Status   06/03/2021 4.1 3.4 - 5.3 mmol/L Final     Chloride   Date Value Ref Range Status   06/03/2021 104 94 - 109 mmol/L Final     Carbon Dioxide   Date Value Ref Range Status   06/03/2021 28 20 - 32 mmol/L Final     Anion Gap   Date Value Ref Range Status   06/03/2021 3 3 - 14 mmol/L Final     Glucose   Date Value Ref Range Status   06/03/2021 91 70 - 99 mg/dL Final     Urea Nitrogen   Date Value Ref Range Status   06/03/2021 21 7 - 30 mg/dL Final     Creatinine   Date Value Ref Range Status   06/03/2021 1.12 0.66 - 1.25 mg/dL Final     GFR Estimate   Date Value Ref Range Status   06/03/2021 81 >60 mL/min/[1.73_m2] Final     Comment:     Non  GFR Calc  Starting 12/18/2018, serum creatinine based estimated GFR (eGFR) will be   calculated using the Chronic Kidney Disease Epidemiology Collaboration   (CKD-EPI) equation.       Calcium   Date Value Ref Range Status   06/03/2021 9.1 8.5 - 10.1 mg/dL Final     Bilirubin Total   Date Value Ref Range Status   06/03/2021 0.4 0.2 - 1.3 mg/dL Final     Alkaline Phosphatase   Date Value Ref Range Status   06/03/2021 71 40 - 150 U/L Final     ALT   Date Value Ref Range Status   06/03/2021 34 0 - 70 U/L Final     AST   Date Value Ref Range Status   06/03/2021 23 0 - 45 U/L Final     MRI Brain w/w/o contrast 2/9/2022:  FINDINGS: No  abnormal intracranial restricted diffusion identified to  suggest acute infarct. The ventricles are normal in size and  configuration. A few scattered punctate nonspecific foci of T2 FLAIR  hyperintense signal are seen in the cerebral white matter (for  example, see series 4, images 17 and 21), not appearing significantly  changed. Although nonspecific, these can represent early sequela of  chronic small vessel ischemic change, and this extent would be  considered within normal limits for a patient of this age. Unchanged  borderline small size of the pituitary gland for age with pituitary  height of approximately 3.5 mm (series 7 image 13). Otherwise, the  morphology, volume, and signal intensity of the brain parenchyma are  within normal limits for age. The major arterial flow voids of the  skull base are grossly maintained. No evidence for intracranial  hemorrhage, extra-axial fluid collection, mass lesion, or abnormal  intracranial postcontrast enhancement.     The orbits appear within normal limits, accounting for limitations of  technique. The calvarium, skull base and mid face otherwise appear  grossly unremarkable.                                                                      IMPRESSION:  1. No acute intracranial process. No significant change compared to  brain MRI of 6/21/2019. Specifically, no intracranial hemorrhage, mass  lesion, infarct, or abnormal enhancement identified.  2. A few punctate nonspecific foci of T2 FLAIR hyperintense signal  noted in the cerebral white matter.  3. Borderline small size of the pituitary gland for the patient's age.  This can be a normal variant, but also can be related to pituitary  atrophy or hypoplasia. Given the patient's history of whole brain  radiation, this could represent pituitary atrophy. Recommend  clinical/laboratory correlation for any evidence to suggest  hypothalamic/pituitary endocrine dysfunction.  4. Otherwise unremarkable brain  MRI.        ASSESSMENT / PLAN:  (E23.7) Pituitary abnormality (H)      1) Pituitary Gland possible atrophy post brain XRT  - No clear ROS triggering s/s of hormonal deficit  - Will check ACTH/Cortisol, TSH/FT4, LH/FSH/Testosterone, urine specific gravity, IGF-1. Prolactin previously checked within normal limits. Check AM fasting labs  - If levels normal, no need for routine testing but only in event of symptoms.      Orders Placed This Encounter   Procedures     Adrenal corticotropin     Cortisol     TSH     T4, free     Luteinizing Hormone, Adult     Follicle stimulating hormone     Specific gravity urine     Insulin Growth Factor 1 by Immunoassay     Testosterone Free and Total        Return to clinic PRN    A total of 60 minutes were spent today 05/10/22 on this visit including chart review, history and counseling, documentation and other activities as detailed above.   Answers for HPI/ROS submitted by the patient on 5/9/2022  General Symptoms: Yes  Skin Symptoms: Yes  HENT Symptoms: No  EYE SYMPTOMS: No  HEART SYMPTOMS: Yes  LUNG SYMPTOMS: No  INTESTINAL SYMPTOMS: Yes  URINARY SYMPTOMS: No  REPRODUCTIVE SYMPTOMS: No  SKELETAL SYMPTOMS: Yes  BLOOD SYMPTOMS: No  NERVOUS SYSTEM SYMPTOMS: Yes  MENTAL HEALTH SYMPTOMS: No  Fever: No  Loss of appetite: No  Weight loss: No  Weight gain: Yes  Fatigue: Yes  Night sweats: No  Chills: No  Increased stress: Yes  Excessive hunger: No  Excessive thirst: No  Feeling hot or cold when others believe the temperature is normal: No  Loss of height: No  Post-operative complications: No  Surgical site pain: No  Hallucinations: No  Change in or Loss of Energy: No  Hyperactivity: No  Confusion: No  Changes in hair: No  Changes in moles/birth marks: No  Itching: Yes  Rashes: Yes  Changes in nails: No  Acne: No  Change in facial hair: No  Warts: No  Non-healing sores: No  Scarring: No  Flaking of skin: No  Color changes of hands/feet in cold : No  Sun sensitivity: No  Skin thickening:  No  Chest pain or pressure: No  Fast or irregular heartbeat: No  Pain in legs with walking: No  Trouble breathing while lying down: No  Fingers or toes appear blue: No  High blood pressure: Yes  Low blood pressure: No  Fainting: No  Murmurs: No  Pacemaker: No  Varicose veins: No  Edema or swelling: No  Wake up at night with shortness of breath: No  Light-headedness: No  Exercise intolerance: No  Heart burn or indigestion: Yes  Nausea: No  Vomiting: No  Abdominal pain: No  Bloating: Yes  Constipation: Yes  Diarrhea: No  Blood in stool: No  Black stools: No  Rectal or Anal pain: No  Fecal incontinence: No  Yellowing of skin or eyes: No  Vomit with blood: No  Change in stools: No  Back pain: No  Muscle aches: No  Neck pain: Yes  Swollen joints: No  Joint pain: No  Bone pain: No  Muscle cramps: No  Muscle weakness: No  Joint stiffness: No  Bone fracture: No  Trouble with coordination: No  Dizziness or trouble with balance: Yes  Fainting or black-out spells: No  Memory loss: No  Headache: Yes  Seizures: No  Speech problems: No  Tingling: No  Tremor: No  Weakness: No  Difficulty walking: No  Paralysis: No  Numbness: No          Again, thank you for allowing me to participate in the care of your patient.        Sincerely,        Bettie Vega MD

## 2022-05-10 NOTE — PROGRESS NOTES
"  Video-Visit Details    Type of service:  Video Visit  Video Start Time: 9:10  Video End Time:9:53  Originating Location (pt. Location): [place], [place]  Distant Location (provider location):  Home  Platform used for Video Visit: Jesse Vega MD      Alfredo Brewer is a 42 year old yo male who presents today via billable video visit for evaluation of pituitary abnormality. OF note, he has past medical history of childhood adult leukemia s/p chemo/XRT in remission since age 3, also vertigo, chronic headaches, HLD.  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 Now following with Dr Pimentel oncology for monitoring/maintenance.   At that time, received chemotherapy, brain radiation and now off treatment for >40 years. Monitoring completed     HPA-  Notes some vertigo like symptoms (10-11 years), fatigue/lethergy especially in the morning (able to complete all ADLs and iADLs, denies nausea/vomiting, denies weight loss/weight gain (weight stable), denies sugar/salt craving (occasional salt craving nothing profound)   - Vertigo first thin in AM, as roll over in bed put one foot on floor and feels \"off\", treats with meclizine, worse during periods of high stress, been evaluated in [facility] balance center for this. Has two different feelings of dizziness- bobblehead feeling vs severe vertigo (requires bed rest x2 days)  HPT- notes some dry/brittle nails on hands, eczema on hands (daily topical treatment), notes constipation (irregular off/on since youth), fatigue, notes occasional cold intolerance. 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 Drinks 24oz x4-5 during work day  GH- notes some fatigue.   Prolactin- notes some fatigue    Denies vision changes including blurry vision, double vision or change in peripheral vision. Notes cranial headaches since 2007 takes amitriptyline.       August 23, 2019 ([facility] testing, [facility]  Prolactin 5.2  Testosterone Total 304 (200-745)  Free Testosterone 9.1 (3.1-12.8)  Bioavailable Testo 214.0 (71.7-300.0)  SHBG 13 (16-70)  LH- 3  Estradiol- 13  FSH- 7.7    5/7/2019  Testosterone 172        Active diagnoses this visit:  Pituitary abnormality (H)     ROS: 10 point ROS neg other than the symptoms noted above in the HPI.      Medical, surgical, social, and family histories, medications and allergies reviewed and updated.    Objective:  Physical Exam (visual exam)  VS:  no vital signs taken for video visit  CONSTITUTIONAL: healthy, alert and NAD, responding appropriately  ENT: normocephalic, no visual evidence of trauma, normal nose and oral mucosa  EYES: conjunctivae and sclerae normal, no exophthalmos or proptosis  THYROID:  no visualized nodules or goiter  LUNGS: no audible wheeze, cough or visible cyanosis, no visible retractions or increased work of breathing  EXTREMITIES: no hand tremors  NEUROLOGY: cranial nerves  grossly intact with no obvious deficit.  SKIN:  no visualized skin lesions or rash, no edema visualized  PSYCH: mentation appears normal, normal judgement        Lab Results   Component Value Date/Time    TSH 1.93 06/03/2021 07:40 AM       Last Comprehensive Metabolic Panel:  Sodium   Date Value Ref Range Status   06/03/2021 135 133 - 144 mmol/L Final     Potassium   Date Value Ref Range Status   06/03/2021 4.1 3.4 - 5.3 mmol/L Final     Chloride   Date Value Ref Range Status   06/03/2021 104 94 - 109 mmol/L Final     Carbon Dioxide   Date Value Ref Range Status   06/03/2021 28 20 - 32 mmol/L Final     Anion Gap   Date Value Ref Range Status   06/03/2021 3 3 - 14 mmol/L Final     Glucose   Date Value Ref Range Status   06/03/2021 91 70 - 99 mg/dL Final     Urea Nitrogen   Date Value Ref Range Status   06/03/2021 21 7 - 30 mg/dL Final     Creatinine   Date Value Ref Range Status   06/03/2021 1.12 0.66 - 1.25 mg/dL Final     GFR Estimate   Date Value Ref Range Status   06/03/2021 81 >60 mL/min/[1.73_m2] Final     Comment:     Non  GFR Calc  Starting 12/18/2018, serum creatinine based estimated GFR (eGFR) will be   calculated using the Chronic Kidney Disease Epidemiology Collaboration   (CKD-EPI) equation.       Calcium   Date Value Ref Range Status   06/03/2021 9.1 8.5 - 10.1 mg/dL Final     Bilirubin Total   Date Value Ref Range Status   06/03/2021 0.4 0.2 - 1.3 mg/dL Final     Alkaline Phosphatase   Date Value Ref Range Status   06/03/2021 71 40 - 150 U/L Final     ALT   Date Value Ref Range Status   06/03/2021 34 0 - 70 U/L Final     AST   Date Value Ref Range Status   06/03/2021 23 0 - 45 U/L Final     MRI Brain w/w/o contrast 2/9/2022:  FINDINGS: No abnormal intracranial restricted diffusion identified to  suggest acute infarct. The ventricles are normal in size and  configuration. A few scattered punctate nonspecific foci of T2 FLAIR  hyperintense signal are seen in the cerebral white matter  (for  example, see series 4, images 17 and 21), not appearing significantly  changed. Although nonspecific, these can represent early sequela of  chronic small vessel ischemic change, and this extent would be  considered within normal limits for a patient of this age. Unchanged  borderline small size of the pituitary gland for age with pituitary  height of approximately 3.5 mm (series 7 image 13). Otherwise, the  morphology, volume, and signal intensity of the brain parenchyma are  within normal limits for age. The major arterial flow voids of the  skull base are grossly maintained. No evidence for intracranial  hemorrhage, extra-axial fluid collection, mass lesion, or abnormal  intracranial postcontrast enhancement.     The orbits appear within normal limits, accounting for limitations of  technique. The calvarium, skull base and mid face otherwise appear  grossly unremarkable.                                                                      IMPRESSION:  1. No acute intracranial process. No significant change compared to  brain MRI of 6/21/2019. Specifically, no intracranial hemorrhage, mass  lesion, infarct, or abnormal enhancement identified.  2. A few punctate nonspecific foci of T2 FLAIR hyperintense signal  noted in the cerebral white matter.  3. Borderline small size of the pituitary gland for the patient's age.  This can be a normal variant, but also can be related to pituitary  atrophy or hypoplasia. Given the patient's history of whole brain  radiation, this could represent pituitary atrophy. Recommend  clinical/laboratory correlation for any evidence to suggest  hypothalamic/pituitary endocrine dysfunction.  4. Otherwise unremarkable brain MRI.        ASSESSMENT / PLAN:  (E23.7) Pituitary abnormality (H)      1) Pituitary Gland possible atrophy post brain XRT  - No clear ROS triggering s/s of hormonal deficit  - Will check ACTH/Cortisol, TSH/FT4, LH/FSH/Testosterone, urine specific gravity, IGF-1.  Prolactin previously checked within normal limits. Check AM fasting labs  - If levels normal, no need for routine testing but only in event of symptoms.      Orders Placed This Encounter   Procedures     Adrenal corticotropin     Cortisol     TSH     T4, free     Luteinizing Hormone, Adult     Follicle stimulating hormone     Specific gravity urine     Insulin Growth Factor 1 by Immunoassay     Testosterone Free and Total        Return to clinic PRN    A total of 60 minutes were spent today 05/10/22 on this visit including chart review, history and counseling, documentation and other activities as detailed above.   Answers for HPI/ROS submitted by the patient on 5/9/2022  General Symptoms: Yes  Skin Symptoms: Yes  HENT Symptoms: No  EYE SYMPTOMS: No  HEART SYMPTOMS: Yes  LUNG SYMPTOMS: No  INTESTINAL SYMPTOMS: Yes  URINARY SYMPTOMS: No  REPRODUCTIVE SYMPTOMS: No  SKELETAL SYMPTOMS: Yes  BLOOD SYMPTOMS: No  NERVOUS SYSTEM SYMPTOMS: Yes  MENTAL HEALTH SYMPTOMS: No  Fever: No  Loss of appetite: No  Weight loss: No  Weight gain: Yes  Fatigue: Yes  Night sweats: No  Chills: No  Increased stress: Yes  Excessive hunger: No  Excessive thirst: No  Feeling hot or cold when others believe the temperature is normal: No  Loss of height: No  Post-operative complications: No  Surgical site pain: No  Hallucinations: No  Change in or Loss of Energy: No  Hyperactivity: No  Confusion: No  Changes in hair: No  Changes in moles/birth marks: No  Itching: Yes  Rashes: Yes  Changes in nails: No  Acne: No  Change in facial hair: No  Warts: No  Non-healing sores: No  Scarring: No  Flaking of skin: No  Color changes of hands/feet in cold : No  Sun sensitivity: No  Skin thickening: No  Chest pain or pressure: No  Fast or irregular heartbeat: No  Pain in legs with walking: No  Trouble breathing while lying down: No  Fingers or toes appear blue: No  High blood pressure: Yes  Low blood pressure: No  Fainting: No  Murmurs: No  Pacemaker:  No  Varicose veins: No  Edema or swelling: No  Wake up at night with shortness of breath: No  Light-headedness: No  Exercise intolerance: No  Heart burn or indigestion: Yes  Nausea: No  Vomiting: No  Abdominal pain: No  Bloating: Yes  Constipation: Yes  Diarrhea: No  Blood in stool: No  Black stools: No  Rectal or Anal pain: No  Fecal incontinence: No  Yellowing of skin or eyes: No  Vomit with blood: No  Change in stools: No  Back pain: No  Muscle aches: No  Neck pain: Yes  Swollen joints: No  Joint pain: No  Bone pain: No  Muscle cramps: No  Muscle weakness: No  Joint stiffness: No  Bone fracture: No  Trouble with coordination: No  Dizziness or trouble with balance: Yes  Fainting or black-out spells: No  Memory loss: No  Headache: Yes  Seizures: No  Speech problems: No  Tingling: No  Tremor: No  Weakness: No  Difficulty walking: No  Paralysis: No  Numbness: No

## 2022-05-11 ENCOUNTER — HOSPITAL ENCOUNTER (OUTPATIENT)
Dept: CARDIOLOGY | Facility: CLINIC | Age: 42
Discharge: HOME | End: 2022-05-11
Attending: PEDIATRICS
Payer: COMMERCIAL

## 2022-05-11 ENCOUNTER — HOSPITAL ENCOUNTER (OUTPATIENT)
Dept: ULTRASOUND IMAGING | Facility: CLINIC | Age: 42
Discharge: HOME | End: 2022-05-11
Attending: DERMATOLOGY
Payer: COMMERCIAL

## 2022-05-11 ENCOUNTER — TELEPHONE (OUTPATIENT)
Dept: ENDOCRINOLOGY | Facility: CLINIC | Age: 42
End: 2022-05-11

## 2022-05-11 DIAGNOSIS — Z85.6 HISTORY OF MYELOID LEUKEMIA: ICD-10-CM

## 2022-05-11 DIAGNOSIS — Z92.21 STATUS POST CHEMOTHERAPY: ICD-10-CM

## 2022-05-11 DIAGNOSIS — D48.9 NEOPLASM, UNCERTAIN WHETHER BENIGN OR MALIGNANT: ICD-10-CM

## 2022-05-11 DIAGNOSIS — C92.01 AML (ACUTE MYELOID LEUKEMIA) IN REMISSION (H): ICD-10-CM

## 2022-05-11 LAB — LVEF ECHO: NORMAL

## 2022-05-11 PROCEDURE — 93306 TTE W/DOPPLER COMPLETE: CPT

## 2022-05-11 PROCEDURE — 93306 TTE W/DOPPLER COMPLETE: CPT | Mod: 26 | Performed by: INTERNAL MEDICINE

## 2022-05-11 PROCEDURE — 76882 US LMTD JT/FCL EVL NVASC XTR: CPT | Mod: RT

## 2022-05-12 DIAGNOSIS — Z92.21 STATUS POST CHEMOTHERAPY: ICD-10-CM

## 2022-05-12 DIAGNOSIS — Z85.6 HISTORY OF MYELOID LEUKEMIA: Primary | ICD-10-CM

## 2022-05-12 DIAGNOSIS — C92.01 AML (ACUTE MYELOID LEUKEMIA) IN REMISSION (H): ICD-10-CM

## 2022-05-20 ENCOUNTER — OFFICE VISIT (OUTPATIENT)
Dept: CARDIOLOGY | Facility: CLINIC | Age: 42
End: 2022-05-20
Payer: COMMERCIAL

## 2022-05-20 VITALS
OXYGEN SATURATION: 97 % | HEIGHT: 68 IN | SYSTOLIC BLOOD PRESSURE: 144 MMHG | HEART RATE: 104 BPM | DIASTOLIC BLOOD PRESSURE: 96 MMHG | WEIGHT: 178.8 LBS | BODY MASS INDEX: 27.1 KG/M2

## 2022-05-20 DIAGNOSIS — I42.9 SECONDARY CARDIOMYOPATHY (H): Primary | ICD-10-CM

## 2022-05-20 DIAGNOSIS — Z92.21 STATUS POST CHEMOTHERAPY: ICD-10-CM

## 2022-05-20 DIAGNOSIS — E78.00 HYPERCHOLESTEREMIA: ICD-10-CM

## 2022-05-20 DIAGNOSIS — Z85.6 HISTORY OF MYELOID LEUKEMIA: ICD-10-CM

## 2022-05-20 DIAGNOSIS — C92.01 AML (ACUTE MYELOID LEUKEMIA) IN REMISSION (H): ICD-10-CM

## 2022-05-20 DIAGNOSIS — R00.0 SINUS TACHYCARDIA: ICD-10-CM

## 2022-05-20 PROCEDURE — 99204 OFFICE O/P NEW MOD 45 MIN: CPT | Performed by: INTERNAL MEDICINE

## 2022-05-20 RX ORDER — CARVEDILOL 6.25 MG/1
6.25 TABLET ORAL 2 TIMES DAILY WITH MEALS
Qty: 180 TABLET | Refills: 3 | Status: SHIPPED | OUTPATIENT
Start: 2022-05-20 | End: 2022-12-23

## 2022-05-20 RX ORDER — ROSUVASTATIN CALCIUM 40 MG/1
40 TABLET, COATED ORAL DAILY
Qty: 90 TABLET | Refills: 3 | Status: SHIPPED | OUTPATIENT
Start: 2022-05-20 | End: 2023-04-06

## 2022-05-20 RX ORDER — ROSUVASTATIN CALCIUM 20 MG/1
20 TABLET, COATED ORAL DAILY
Qty: 90 TABLET | Refills: 3 | COMMUNITY
Start: 2022-05-20 | End: 2022-05-20

## 2022-05-20 NOTE — PROGRESS NOTES
Cardiology Consultation      Alfredo Brewer MRN# [id number]   YOB: 1980 Age: 42 year old   Date of Visit 05/20/2022     Reason for consult:  Cardio oncology           Assessment and Plan:     1. Mild cardiomyopathy on echocardiogram 5/2022, images personally reviewed in clinic    Plan reduction in heart rate with carvedilol 6.25 mg twice daily, increase if feeling well.  Plan to go slow to prevent side effects.  May help his migraines as well.    Goal is better blood pressure control, will do this mainly with beta-blocker if tolerated.    Follow-up KARLA and limited echocardiogram with strain in 6 months to reassess.      2. Hypertension, see above    Adding carvedilol, titrate future.      3. Hypercholesterolemia    Increase rosuvastatin to 40 mg daily as his LDL was 91 and we would like him maximally treated to prevent any secondary ischemic cardiomyopathy in the future.      This note was transcribed using electronic voice recognition software, typographical errors may be present.                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  No radiation to the chest, radiation to the brain was delivered.    He states that in his teen years, he had some cardiomyopathy and was put on lisinopril which improved the cardiomyopathy.  He has not been on antihypertensives for this since his 20s.    He has been on some Dyazide which made him dizzy, he has discontinued it.  He has never been on a beta-blocker and it has not been recommended for him despite ongoing tachycardia.    He has some lower extremity hair loss, but good and normal pedal pulses.    He has hypercholesterolemia and is tolerating rosuvastatin 20 mg daily with an LDL of 91 after treatment.           Physical Exam:     Vitals: BP (!) 144/96 (BP Location: Right arm,  "Patient Position: Sitting, Cuff Size: Adult Regular)   Pulse 104   Ht 1.715 m (5' 7.5\")   Wt 81.1 kg (178 lb 12.8 oz)   SpO2 97%   BMI 27.59 kg/m    Constitutional:  cooperative, alert and oriented, well developed, well nourished, in no acute distress        Skin:  warm and dry to the touch, no apparent skin lesions or masses noted surgical scars well-healed      Head:  normocephalic, no masses or lesions        Eyes:  pupils equal and round, conjunctivae and lids unremarkable, sclera white, no xanthalasma, EOMS intact, no nystagmus        ENT:  no pallor or cyanosis        Neck:  JVP normal        Chest:  normal symmetry        Cardiac: regular rhythm;no murmurs, gallops or rubs detected tachycardic                Abdomen:  BS normoactive        Vascular: pulses full and equal             1+                        Extremities and Back:  no edema        Neurological:  no gross motor deficits;affect appropriate                      Past Medical History:   I have reviewed this patient's past medical history  Leukemia, cardiomyopathy, hypertension, hypercholesterolemia          Past Surgical History:   I have reviewed this patient's past surgical history  History reviewed. No pertinent surgical history.            Social History:   I have reviewed this patient's social history  Social History     Tobacco Use     Smoking status: Never Smoker     Smokeless tobacco: Never Used   Substance Use Topics     Alcohol use: Yes     Comment: a few beers a week             Family History:   I have reviewed this patient's family history  History reviewed. No pertinent family history.          Allergies:     Allergies   Allergen Reactions     Codeine      Nausea and vomiting     Erythromycin      Stomach pain     Prednisone      Stomach pain     Vicodin [Acetaminophen]      Stomach pain     Wheat Bran GI Disturbance             Medications:   I have reviewed this patient's current medications  Current Outpatient Medications "   Medication Sig Dispense Refill     albuterol (PROAIR HFA/PROVENTIL HFA/VENTOLIN HFA) 108 (90 Base) MCG/ACT inhaler Inhale 1-2 puffs into the lungs       amitriptyline (ELAVIL) 25 MG tablet        carvedilol (COREG) 6.25 MG tablet Take 1 tablet (6.25 mg) by mouth 2 times daily (with meals) 180 tablet 3     FISH OIL-VITAMIN D PO        lactobacillus rhamnosus, GG, (CULTURELL) capsule Take 1 capsule by mouth daily       meclizine (ANTIVERT) 25 MG tablet Take 1 tablet (25 mg) by mouth every 6 hours as needed for dizziness 30 tablet 1     omeprazole (PRILOSEC) 20 MG DR capsule        rosuvastatin (CRESTOR) 40 MG tablet Take 1 tablet (40 mg) by mouth daily 90 tablet 3     AMITRIPTYLINE HCL PO Take by mouth At Bedtime (Patient not taking: No sig reported)                 Review of Systems:       Review of Systems:  Skin:  Positive for hair changes   Eyes:  not assessed    ENT:  Positive for vertigo  Respiratory:  Positive for dyspnea on exertion  Cardiovascular:    Positive for;dizziness  Gastroenterology: Negative    Genitourinary: Negative    Musculoskeletal: Negative    Neurologic: Migraine headaches    Psychiatric:    Normal mood  Heme/Lymph/Imm:    History of leukemia  Endocrine:    Possible pituitary abnormality                     Data:   All available laboratory data reviewed  Lab Results   Component Value Date    CHOL 187 06/03/2021     Lab Results   Component Value Date    HDL 52 06/03/2021     Lab Results   Component Value Date    LDL 78 06/03/2021     Lab Results   Component Value Date    TRIG 283 06/03/2021     No results found for: CHOLHDLRATIO  TSH   Date Value Ref Range Status   06/03/2021 1.93 0.40 - 4.00 mU/L Final     Last Basic Metabolic Panel:  Lab Results   Component Value Date     06/03/2021      Lab Results   Component Value Date    POTASSIUM 4.1 06/03/2021     Lab Results   Component Value Date    CHLORIDE 104 06/03/2021     Lab Results   Component Value Date    JOE 9.1 06/03/2021     Lab  Results   Component Value Date    CO2 28 06/03/2021     Lab Results   Component Value Date    BUN 21 06/03/2021     Lab Results   Component Value Date    CR 1.12 06/03/2021     Lab Results   Component Value Date    GLC 91 06/03/2021     Lab Results   Component Value Date    WBC 5.3 06/03/2021     Lab Results   Component Value Date    RBC 4.32 06/03/2021     Lab Results   Component Value Date    HGB 13.0 06/03/2021     Lab Results   Component Value Date    HCT 39.0 06/03/2021     Lab Results   Component Value Date    MCV 90 06/03/2021     Lab Results   Component Value Date    MCH 30.1 06/03/2021     Lab Results   Component Value Date    MCHC 33.3 06/03/2021     Lab Results   Component Value Date    RDW 12.3 06/03/2021     Lab Results   Component Value Date     06/03/2021

## 2022-05-20 NOTE — LETTER
5/20/2022    MD Jocelin Siddiqui [address] [address] [facility] [address] [place] [address]    RE: Alfredo DEGROOT Osman       Dear Colleague,     I had the pleasure of seeing Alfredo Brewer in the [facility].  Cardiology Consultation      Alfredo Brewer MRN# [id number]   YOB: 1980 Age: 42 year old   Date of Visit 05/20/2022     Reason for consult:  Cardio oncology           Assessment and Plan:     1. Mild cardiomyopathy on echocardiogram 5/2022, images personally reviewed in clinic    Plan reduction in heart rate with carvedilol 6.25 mg twice daily, increase if feeling well.  Plan to go slow to prevent side effects.  May help his migraines as well.    Goal is better blood pressure control, will do this mainly with beta-blocker if tolerated.    Follow-up KARLA and limited echocardiogram with strain in 6 months to reassess.      2. Hypertension, see above    Adding carvedilol, titrate future.      3. Hypercholesterolemia    Increase rosuvastatin to 40 mg daily as his LDL was 91 and we would like him maximally treated to prevent any secondary ischemic cardiomyopathy in the future.      This note was transcribed using electronic voice recognition software, typographical errors may be present.                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  No radiation to the chest, radiation to the brain was delivered.    He states that in his teen years, he had some cardiomyopathy and was put on lisinopril which improved the cardiomyopathy.  He has not been on antihypertensives for this since his 20s.    He has been on some Dyazide which made him dizzy, he has discontinued it.  He has never been on a beta-blocker and it has not been recommended for him despite ongoing tachycardia.    He has some lower  Surgery "extremity hair loss, but good and normal pedal pulses.    He has hypercholesterolemia and is tolerating rosuvastatin 20 mg daily with an LDL of 91 after treatment.           Physical Exam:     Vitals: BP (!) 144/96 (BP Location: Right arm, Patient Position: Sitting, Cuff Size: Adult Regular)   Pulse 104   Ht 1.715 m (5' 7.5\")   Wt 81.1 kg (178 lb 12.8 oz)   SpO2 97%   BMI 27.59 kg/m    Constitutional:  cooperative, alert and oriented, well developed, well nourished, in no acute distress        Skin:  warm and dry to the touch, no apparent skin lesions or masses noted surgical scars well-healed      Head:  normocephalic, no masses or lesions        Eyes:  pupils equal and round, conjunctivae and lids unremarkable, sclera white, no xanthalasma, EOMS intact, no nystagmus        ENT:  no pallor or cyanosis        Neck:  JVP normal        Chest:  normal symmetry        Cardiac: regular rhythm;no murmurs, gallops or rubs detected tachycardic                Abdomen:  BS normoactive        Vascular: pulses full and equal             1+                        Extremities and Back:  no edema        Neurological:  no gross motor deficits;affect appropriate                      Past Medical History:   I have reviewed this patient's past medical history  Leukemia, cardiomyopathy, hypertension, hypercholesterolemia          Past Surgical History:   I have reviewed this patient's past surgical history  History reviewed. No pertinent surgical history.            Social History:   I have reviewed this patient's social history  Social History     Tobacco Use     Smoking status: Never Smoker     Smokeless tobacco: Never Used   Substance Use Topics     Alcohol use: Yes     Comment: a few beers a week             Family History:   I have reviewed this patient's family history  History reviewed. No pertinent family history.          Allergies:     Allergies   Allergen Reactions     Codeine      Nausea and vomiting     Erythromycin  "     Stomach pain     Prednisone      Stomach pain     Vicodin [Acetaminophen]      Stomach pain     Wheat Bran GI Disturbance             Medications:   I have reviewed this patient's current medications  Current Outpatient Medications   Medication Sig Dispense Refill     albuterol (PROAIR HFA/PROVENTIL HFA/VENTOLIN HFA) 108 (90 Base) MCG/ACT inhaler Inhale 1-2 puffs into the lungs       amitriptyline (ELAVIL) 25 MG tablet        carvedilol (COREG) 6.25 MG tablet Take 1 tablet (6.25 mg) by mouth 2 times daily (with meals) 180 tablet 3     FISH OIL-VITAMIN D PO        lactobacillus rhamnosus, GG, (CULTURELL) capsule Take 1 capsule by mouth daily       meclizine (ANTIVERT) 25 MG tablet Take 1 tablet (25 mg) by mouth every 6 hours as needed for dizziness 30 tablet 1     omeprazole (PRILOSEC) 20 MG DR capsule        rosuvastatin (CRESTOR) 40 MG tablet Take 1 tablet (40 mg) by mouth daily 90 tablet 3     AMITRIPTYLINE HCL PO Take by mouth At Bedtime (Patient not taking: No sig reported)                 Review of Systems:       Review of Systems:  Skin:  Positive for hair changes   Eyes:  not assessed    ENT:  Positive for vertigo  Respiratory:  Positive for dyspnea on exertion  Cardiovascular:    Positive for;dizziness  Gastroenterology: Negative    Genitourinary: Negative    Musculoskeletal: Negative    Neurologic: Migraine headaches    Psychiatric:    Normal mood  Heme/Lymph/Imm:    History of leukemia  Endocrine:    Possible pituitary abnormality                     Data:   All available laboratory data reviewed  Lab Results   Component Value Date    CHOL 187 06/03/2021     Lab Results   Component Value Date    HDL 52 06/03/2021     Lab Results   Component Value Date    LDL 78 06/03/2021     Lab Results   Component Value Date    TRIG 283 06/03/2021     No results found for: CHOLHDLRATIO  TSH   Date Value Ref Range Status   06/03/2021 1.93 0.40 - 4.00 mU/L Final     Last Basic Metabolic Panel:  Lab Results   Component  Value Date     06/03/2021      Lab Results   Component Value Date    POTASSIUM 4.1 06/03/2021     Lab Results   Component Value Date    CHLORIDE 104 06/03/2021     Lab Results   Component Value Date    JOE 9.1 06/03/2021     Lab Results   Component Value Date    CO2 28 06/03/2021     Lab Results   Component Value Date    BUN 21 06/03/2021     Lab Results   Component Value Date    CR 1.12 06/03/2021     Lab Results   Component Value Date    GLC 91 06/03/2021     Lab Results   Component Value Date    WBC 5.3 06/03/2021     Lab Results   Component Value Date    RBC 4.32 06/03/2021     Lab Results   Component Value Date    HGB 13.0 06/03/2021     Lab Results   Component Value Date    HCT 39.0 06/03/2021     Lab Results   Component Value Date    MCV 90 06/03/2021     Lab Results   Component Value Date    MCH 30.1 06/03/2021     Lab Results   Component Value Date    MCHC 33.3 06/03/2021     Lab Results   Component Value Date    RDW 12.3 06/03/2021     Lab Results   Component Value Date     06/03/2021       Thank you for allowing me to participate in the care of your patient.      Sincerely,     Harman Curtis MD     [facility]  cc:   Arti Pimentel MD  [address] [address]

## 2022-05-20 NOTE — PATIENT INSTRUCTIONS
Let's increase the ROSUVASTATIN to 40mg daily and see if you have any side effects like muscle aches. If no side effects, continue on that dose.    Let's start CARVEDILOL (beta blocker) at a small dose for 2 weeks and then message me and let me know how you're doing. We may increase the dose at that time if you're feeling well.    See my KARLA with another echo in 6 months.

## 2022-05-24 ENCOUNTER — LAB (OUTPATIENT)
Dept: LAB | Facility: CLINIC | Age: 42
End: 2022-05-24
Payer: COMMERCIAL

## 2022-05-24 DIAGNOSIS — E23.7 PITUITARY ABNORMALITY (H): ICD-10-CM

## 2022-05-24 LAB
CORTIS SERPL-MCNC: 18.9 UG/DL (ref 4–22)
FSH SERPL-ACNC: 7.7 IU/L (ref 0.7–10.8)
LH SERPL-ACNC: 3.2 IU/L (ref 1.5–9.3)
SHBG SERPL-SCNC: 10 NMOL/L (ref 11–80)
SP GR UR STRIP: 1.02 (ref 1–1.03)
T4 FREE SERPL-MCNC: 0.71 NG/DL (ref 0.76–1.46)
TSH SERPL DL<=0.005 MIU/L-ACNC: 1.36 MU/L (ref 0.4–4)

## 2022-05-24 PROCEDURE — 83002 ASSAY OF GONADOTROPIN (LH): CPT

## 2022-05-24 PROCEDURE — 84270 ASSAY OF SEX HORMONE GLOBUL: CPT

## 2022-05-24 PROCEDURE — 82024 ASSAY OF ACTH: CPT

## 2022-05-24 PROCEDURE — 84443 ASSAY THYROID STIM HORMONE: CPT

## 2022-05-24 PROCEDURE — 83001 ASSAY OF GONADOTROPIN (FSH): CPT

## 2022-05-24 PROCEDURE — 36415 COLL VENOUS BLD VENIPUNCTURE: CPT

## 2022-05-24 PROCEDURE — 84305 ASSAY OF SOMATOMEDIN: CPT

## 2022-05-24 PROCEDURE — 84403 ASSAY OF TOTAL TESTOSTERONE: CPT

## 2022-05-24 PROCEDURE — 84439 ASSAY OF FREE THYROXINE: CPT

## 2022-05-24 PROCEDURE — 81003 URINALYSIS AUTO W/O SCOPE: CPT

## 2022-05-24 PROCEDURE — 82533 TOTAL CORTISOL: CPT

## 2022-05-25 LAB — ACTH PLAS-MCNC: 24 PG/ML

## 2022-05-26 DIAGNOSIS — E23.7 PITUITARY ABNORMALITY (H): Primary | ICD-10-CM

## 2022-05-26 LAB
TESTOST FREE SERPL-MCNC: 7.53 NG/DL
TESTOST SERPL-MCNC: 236 NG/DL (ref 240–950)

## 2022-05-27 LAB — IGF-I BLD-MCNC: 148 NG/ML (ref 80–235)

## 2022-06-19 ENCOUNTER — OFFICE VISIT (OUTPATIENT)
Dept: URGENT CARE | Facility: URGENT CARE | Age: 42
End: 2022-06-19
Payer: COMMERCIAL

## 2022-06-19 VITALS
BODY MASS INDEX: 27.47 KG/M2 | DIASTOLIC BLOOD PRESSURE: 99 MMHG | TEMPERATURE: 98.5 F | HEART RATE: 112 BPM | OXYGEN SATURATION: 97 % | WEIGHT: 178 LBS | SYSTOLIC BLOOD PRESSURE: 142 MMHG | RESPIRATION RATE: 16 BRPM

## 2022-06-19 DIAGNOSIS — R07.0 THROAT PAIN: ICD-10-CM

## 2022-06-19 DIAGNOSIS — Z20.822 SUSPECTED 2019 NOVEL CORONAVIRUS INFECTION: Primary | ICD-10-CM

## 2022-06-19 LAB — DEPRECATED S PYO AG THROAT QL EIA: NEGATIVE

## 2022-06-19 PROCEDURE — U0003 INFECTIOUS AGENT DETECTION BY NUCLEIC ACID (DNA OR RNA); SEVERE ACUTE RESPIRATORY SYNDROME CORONAVIRUS 2 (SARS-COV-2) (CORONAVIRUS DISEASE [COVID-19]), AMPLIFIED PROBE TECHNIQUE, MAKING USE OF HIGH THROUGHPUT TECHNOLOGIES AS DESCRIBED BY CMS-2020-01-R: HCPCS | Performed by: FAMILY MEDICINE

## 2022-06-19 PROCEDURE — 87651 STREP A DNA AMP PROBE: CPT | Performed by: FAMILY MEDICINE

## 2022-06-19 PROCEDURE — 99203 OFFICE O/P NEW LOW 30 MIN: CPT | Performed by: FAMILY MEDICINE

## 2022-06-19 PROCEDURE — U0005 INFEC AGEN DETEC AMPLI PROBE: HCPCS | Performed by: FAMILY MEDICINE

## 2022-06-20 LAB
GROUP A STREP BY PCR: NOT DETECTED
SARS-COV-2 RNA RESP QL NAA+PROBE: NEGATIVE

## 2022-06-23 ENCOUNTER — LAB (OUTPATIENT)
Dept: LAB | Facility: CLINIC | Age: 42
End: 2022-06-23
Payer: COMMERCIAL

## 2022-06-23 DIAGNOSIS — G62.0 DRUG-INDUCED POLYNEUROPATHY (H): ICD-10-CM

## 2022-06-23 DIAGNOSIS — C92.01 AML (ACUTE MYELOID LEUKEMIA) IN REMISSION (H): ICD-10-CM

## 2022-06-23 DIAGNOSIS — E23.7 PITUITARY ABNORMALITY (H): ICD-10-CM

## 2022-06-23 DIAGNOSIS — Z92.21 STATUS POST CHEMOTHERAPY: ICD-10-CM

## 2022-06-23 LAB
CK SERPL-CCNC: 180 U/L (ref 30–300)
FOLATE SERPL-MCNC: 19.2 NG/ML (ref 4.6–34.8)
TOTAL PROTEIN SERUM FOR ELP: 7.3 G/DL (ref 6.4–8.3)
VIT B12 SERPL-MCNC: 495 PG/ML (ref 232–1245)

## 2022-06-23 PROCEDURE — 99000 SPECIMEN HANDLING OFFICE-LAB: CPT

## 2022-06-23 PROCEDURE — 82550 ASSAY OF CK (CPK): CPT

## 2022-06-23 PROCEDURE — 82607 VITAMIN B-12: CPT

## 2022-06-23 PROCEDURE — 84155 ASSAY OF PROTEIN SERUM: CPT

## 2022-06-23 PROCEDURE — 84403 ASSAY OF TOTAL TESTOSTERONE: CPT

## 2022-06-23 PROCEDURE — 83921 ORGANIC ACID SINGLE QUANT: CPT

## 2022-06-23 PROCEDURE — 84270 ASSAY OF SEX HORMONE GLOBUL: CPT

## 2022-06-23 PROCEDURE — 82746 ASSAY OF FOLIC ACID SERUM: CPT

## 2022-06-23 PROCEDURE — 84165 PROTEIN E-PHORESIS SERUM: CPT

## 2022-06-23 PROCEDURE — 36415 COLL VENOUS BLD VENIPUNCTURE: CPT

## 2022-06-23 PROCEDURE — 84439 ASSAY OF FREE THYROXINE: CPT | Mod: 90

## 2022-06-24 LAB
ALBUMIN SERPL ELPH-MCNC: 4.7 G/DL (ref 3.7–5.1)
ALPHA1 GLOB SERPL ELPH-MCNC: 0.2 G/DL (ref 0.2–0.4)
ALPHA2 GLOB SERPL ELPH-MCNC: 0.6 G/DL (ref 0.5–0.9)
B-GLOBULIN SERPL ELPH-MCNC: 0.8 G/DL (ref 0.6–1)
GAMMA GLOB SERPL ELPH-MCNC: 0.9 G/DL (ref 0.7–1.6)
M PROTEIN SERPL ELPH-MCNC: 0 G/DL
PROT PATTERN SERPL ELPH-IMP: NORMAL
SHBG SERPL-SCNC: 11 NMOL/L (ref 11–80)

## 2022-06-25 LAB
TESTOST FREE SERPL-MCNC: 6.42 NG/DL
TESTOST SERPL-MCNC: 208 NG/DL (ref 240–950)

## 2022-06-27 NOTE — RESULT ENCOUNTER NOTE
Confirming low testosterone  Please schedule return appointment to discuss results and treatment options

## 2022-06-28 DIAGNOSIS — E03.8 CENTRAL HYPOTHYROIDISM: ICD-10-CM

## 2022-06-28 DIAGNOSIS — E23.7 PITUITARY ABNORMALITY (H): Primary | ICD-10-CM

## 2022-06-28 LAB
METHYLMALONATE SERPL-SCNC: 0.12 UMOL/L (ref 0–0.4)
T4 FREE SERPL DIALY-MCNC: 0.6 NG/DL

## 2022-06-28 RX ORDER — LEVOTHYROXINE SODIUM 50 UG/1
50 TABLET ORAL DAILY
Qty: 90 TABLET | Refills: 3 | Status: SHIPPED | OUTPATIENT
Start: 2022-06-28 | End: 2022-08-30

## 2022-06-28 NOTE — RESULT ENCOUNTER NOTE
Confirming hypothyroidism as well  Dear Alfredo,     Here are your recent results. I see we have an appointment 8/2 to discuss testosterone replacement. This also shows you have confirmed low thyroid function as well-- I'd like you to start a supplement of levothyroxine (synthetic thyroid hormone) in the meantime. I'll send the prescription over to your pharmacy, and we recheck levels after 4-6 weeks, which would be just before your appointment so we can discuss dosing of that at that time as well.     Please let us know if you have any questions or concerns.    Regards,  Bettie Vega MD

## 2022-07-08 NOTE — PROGRESS NOTES
SUBJECTIVE: Alfredo Brewer is a 42 year old male presenting with a chief complaint of sore throat and fatigue.  Onset of symptoms was day(s) ago.    No past medical history on file.  Allergies   Allergen Reactions     Codeine      Nausea and vomiting     Erythromycin      Stomach pain     Prednisone      Stomach pain     Vicodin [Acetaminophen]      Stomach pain     Wheat Bran GI Disturbance     Social History     Tobacco Use     Smoking status: Never Smoker     Smokeless tobacco: Never Used   Substance Use Topics     Alcohol use: Yes     Comment: a few beers a week       ROS:  SKIN: no rash  GI: no vomiting    OBJECTIVE:  BP (!) 142/99   Pulse 112   Temp 98.5  F (36.9  C)   Resp 16   Wt 80.7 kg (178 lb)   SpO2 97%   BMI 27.47 kg/m  GENERAL APPEARANCE: healthy, alert and no distress  EYES: EOMI,  PERRL, conjunctiva clear  HENT: ear canals and TM's normal.  Nose and mouth without ulcers, erythema or lesions  RESP: lungs clear to auscultation - no rales, rhonchi or wheezes  SKIN: no suspicious lesions or rashes      ICD-10-CM    1. Suspected 2019 novel coronavirus infection  Z20.822 Symptomatic; Unknown COVID-19 Virus (Coronavirus) by PCR Nose   2. Throat pain  R07.0 Streptococcus A Rapid Screen w/Reflex to PCR - Clinic Collect     Group A Streptococcus PCR Throat Swab       Fluids/Rest, f/u if worse/not any better

## 2022-08-01 ASSESSMENT — ENCOUNTER SYMPTOMS
INCREASED ENERGY: 0
STIFFNESS: 0
LOSS OF CONSCIOUSNESS: 0
TINGLING: 0
SLEEP DISTURBANCES DUE TO BREATHING: 0
WEAKNESS: 0
MUSCLE CRAMPS: 0
HEARTBURN: 1
FEVER: 0
NUMBNESS: 0
POLYDIPSIA: 0
HEADACHES: 1
DECREASED APPETITE: 0
PALPITATIONS: 0
WEIGHT GAIN: 0
DIZZINESS: 1
ORTHOPNEA: 0
RECTAL PAIN: 0
VOMITING: 0
PARALYSIS: 0
HYPOTENSION: 0
LIGHT-HEADEDNESS: 1
SEIZURES: 0
SPEECH CHANGE: 0
DIARRHEA: 0
TREMORS: 0
SYNCOPE: 0
BLOOD IN STOOL: 0
JOINT SWELLING: 0
DISTURBANCES IN COORDINATION: 0
ALTERED TEMPERATURE REGULATION: 0
NECK PAIN: 1
EXERCISE INTOLERANCE: 0
NIGHT SWEATS: 0
NAUSEA: 0
MEMORY LOSS: 0
HYPERTENSION: 1
HALLUCINATIONS: 0
BACK PAIN: 0
ARTHRALGIAS: 0
LEG PAIN: 0
MYALGIAS: 1
JAUNDICE: 0
CONSTIPATION: 1
POLYPHAGIA: 0
BLOATING: 1
MUSCLE WEAKNESS: 0
FATIGUE: 1
CHILLS: 0
WEIGHT LOSS: 0
ABDOMINAL PAIN: 0
BOWEL INCONTINENCE: 0

## 2022-08-02 ENCOUNTER — VIRTUAL VISIT (OUTPATIENT)
Dept: ENDOCRINOLOGY | Facility: CLINIC | Age: 42
End: 2022-08-02
Payer: COMMERCIAL

## 2022-08-02 DIAGNOSIS — E03.8 CENTRAL HYPOTHYROIDISM: Primary | ICD-10-CM

## 2022-08-02 PROCEDURE — 99214 OFFICE O/P EST MOD 30 MIN: CPT | Mod: GT | Performed by: INTERNAL MEDICINE

## 2022-08-02 NOTE — PROGRESS NOTES
Video-Visit Details    Type of service:  Video Visit  Video Start Time: 9:35  Video End Time:10:02  Originating Location (pt. Location): [place], [place]  Distant Location (provider location):  Home  Platform used for Video Visit: Jesse Vega MD      Alfredo Brewer is a 42 year old yo male who presents today via billable video visit for follow-up of hypopituitarism OF note, he has past medical history of childhood adult leukemia s/p chemo/XRT in remission since age 3, also vertigo, chronic headaches, HLD.  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 Now following with Dr Pimentel oncology for monitoring/maintenance.  Noted on MRI brain to have small range pituitary gland, came for evaluation. Of note, had normal puberty/growth.   Testing revealed low T4 (confirmed by T4 free equilibrium) and low testosterone. Other axes intact. See note from 5/2022 for full ROS/HPI    2) Central Hypothyroidism  Current regimen- Levothyroxine 50mcg    3) Central hypogonadism  - Low testosterone level, pending trial for conception. Completed testing in 2019 (as below) with male fertility specialist at [facility] with reportedly normal sperm at that time with very similar testosterone levels to now      August 23, 2019 ([facility] testing, [facility])  Prolactin 5.2  Testosterone Total 304 (200-745)  Free Testosterone 9.1 (3.1-12.8)  Bioavailable Testo 214.0 (71.7-300.0)  SHBG 13 (16-70)  LH- 3  Estradiol- 13  FSH- 7.7    5/7/2019  Testosterone 172        Active diagnoses this visit:  Central hypothyroidism     ROS: 10 point ROS neg other than the  symptoms noted above in the HPI.      Medical, surgical, social, and family histories, medications and allergies reviewed and updated.    Objective:  Physical Exam (visual exam)  VS:  no vital signs taken for video visit  CONSTITUTIONAL: healthy, alert and NAD, responding appropriately  ENT: normocephalic, no visual evidence of trauma, normal nose and oral mucosa  EYES: conjunctivae and sclerae normal, no exophthalmos or proptosis  THYROID:  no visualized nodules or goiter  LUNGS: no audible wheeze, cough or visible cyanosis, no visible retractions or increased work of breathing  EXTREMITIES: no hand tremors  NEUROLOGY: cranial nerves grossly intact with no obvious deficit.  SKIN:  no visualized skin lesions or rash, no edema visualized  PSYCH: mentation appears normal, normal judgement          Lab Results   Component Value Date/Time    TSH 1.36 05/24/2022 07:50 AM    TSH 1.93 06/03/2021 07:40 AM    T4 0.71 (L) 05/24/2022 07:50 AM    LH 3.2 05/24/2022 07:50 AM    FSH 7.7 05/24/2022 07:50 AM       Last Comprehensive Metabolic Panel:  Sodium   Date Value Ref Range Status   06/03/2021 135 133 - 144 mmol/L Final     Potassium   Date Value Ref Range Status   06/03/2021 4.1 3.4 - 5.3 mmol/L Final     Chloride   Date Value Ref Range Status   06/03/2021 104 94 - 109 mmol/L Final     Carbon Dioxide   Date Value Ref Range Status   06/03/2021 28 20 - 32 mmol/L Final     Anion Gap   Date Value Ref Range Status   06/03/2021 3 3 - 14 mmol/L Final     Glucose   Date Value Ref Range Status   06/03/2021 91 70 - 99 mg/dL Final     Urea Nitrogen   Date Value Ref Range Status   06/03/2021 21 7 - 30 mg/dL Final     Creatinine   Date Value Ref Range Status   06/03/2021 1.12 0.66 - 1.25 mg/dL Final     GFR Estimate   Date Value Ref Range Status   06/03/2021 81 >60 mL/min/[1.73_m2] Final     Comment:     Non  GFR Calc  Starting 12/18/2018, serum creatinine based estimated GFR (eGFR) will be   calculated using the Chronic  Kidney Disease Epidemiology Collaboration   (CKD-EPI) equation.       Calcium   Date Value Ref Range Status   06/03/2021 9.1 8.5 - 10.1 mg/dL Final     Bilirubin Total   Date Value Ref Range Status   06/03/2021 0.4 0.2 - 1.3 mg/dL Final     Alkaline Phosphatase   Date Value Ref Range Status   06/03/2021 71 40 - 150 U/L Final     ALT   Date Value Ref Range Status   06/03/2021 34 0 - 70 U/L Final     AST   Date Value Ref Range Status   06/03/2021 23 0 - 45 U/L Final     MRI Brain w/w/o contrast 2/9/2022:  FINDINGS: No abnormal intracranial restricted diffusion identified to  suggest acute infarct. The ventricles are normal in size and  configuration. A few scattered punctate nonspecific foci of T2 FLAIR  hyperintense signal are seen in the cerebral white matter (for  example, see series 4, images 17 and 21), not appearing significantly  changed. Although nonspecific, these can represent early sequela of  chronic small vessel ischemic change, and this extent would be  considered within normal limits for a patient of this age. Unchanged  borderline small size of the pituitary gland for age with pituitary  height of approximately 3.5 mm (series 7 image 13). Otherwise, the  morphology, volume, and signal intensity of the brain parenchyma are  within normal limits for age. The major arterial flow voids of the  skull base are grossly maintained. No evidence for intracranial  hemorrhage, extra-axial fluid collection, mass lesion, or abnormal  intracranial postcontrast enhancement.     The orbits appear within normal limits, accounting for limitations of  technique. The calvarium, skull base and mid face otherwise appear  grossly unremarkable.                                                                      IMPRESSION:  1. No acute intracranial process. No significant change compared to  brain MRI of 6/21/2019. Specifically, no intracranial hemorrhage, mass  lesion, infarct, or abnormal enhancement identified.  2. A few  punctate nonspecific foci of T2 FLAIR hyperintense signal  noted in the cerebral white matter.  3. Borderline small size of the pituitary gland for the patient's age.  This can be a normal variant, but also can be related to pituitary  atrophy or hypoplasia. Given the patient's history of whole brain  radiation, this could represent pituitary atrophy. Recommend  clinical/laboratory correlation for any evidence to suggest  hypothalamic/pituitary endocrine dysfunction.  4. Otherwise unremarkable brain MRI.        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 Recommended treatment with clomiphene vs hcg injections instead of testosterone given his current goals.   - He will notify me if difficulty conceiving or interested in trialing sperm count to see if needed        Orders Placed This Encounter   Procedures     T3 total     T4 free     TSH        Return to clinic 3 months    A total of 37 minutes were spent today 08/02/22 on this visit including chart review, history and counseling, documentation and other activities as detailed above.     Answers for HPI/ROS submitted by the patient on 8/1/2022  General Symptoms: Yes  Skin Symptoms: No  HENT Symptoms: No  EYE SYMPTOMS: No  HEART SYMPTOMS: Yes  LUNG SYMPTOMS: No  INTESTINAL SYMPTOMS: Yes  URINARY SYMPTOMS: No  REPRODUCTIVE SYMPTOMS: No  SKELETAL SYMPTOMS: Yes  BLOOD SYMPTOMS: No  NERVOUS SYSTEM SYMPTOMS: Yes  MENTAL HEALTH SYMPTOMS: No  Fever: No  Loss of appetite: No  Weight loss: No  Weight gain: No  Fatigue: Yes  Night sweats: No  Chills: No  Increased stress: Yes  Excessive hunger: No  Excessive thirst: No  Feeling hot or cold when  others believe the temperature is normal: No  Loss of height: No  Post-operative complications: No  Surgical site pain: No  Hallucinations: No  Change in or Loss of Energy: No  Hyperactivity: No  Confusion: No  Chest pain or pressure: No  Fast or irregular heartbeat: No  Pain in legs with walking: No  Trouble breathing while lying down: No  Fingers or toes appear blue: No  High blood pressure: Yes  Low blood pressure: No  Fainting: No  Murmurs: No  Pacemaker: No  Varicose veins: No  Edema or swelling: No  Wake up at night with shortness of breath: No  Light-headedness: Yes  Exercise intolerance: No  Heart burn or indigestion: Yes  Nausea: No  Vomiting: No  Abdominal pain: No  Bloating: Yes  Constipation: Yes  Diarrhea: No  Blood in stool: No  Black stools: No  Rectal or Anal pain: No  Fecal incontinence: No  Yellowing of skin or eyes: No  Vomit with blood: No  Change in stools: No  Back pain: No  Muscle aches: Yes  Neck pain: Yes  Swollen joints: No  Joint pain: No  Bone pain: No  Muscle cramps: No  Muscle weakness: No  Joint stiffness: No  Bone fracture: No  Trouble with coordination: No  Dizziness or trouble with balance: Yes  Fainting or black-out spells: No  Memory loss: No  Headache: Yes  Seizures: No  Speech problems: No  Tingling: No  Tremor: No  Weakness: No  Difficulty walking: No  Paralysis: No  Numbness: No

## 2022-08-02 NOTE — LETTER
8/2/2022         RE: Alfredo Brewer  [address] [facility] [address]        Dear Colleague,    Thank you for referring your patient, Alfredo Brewer, to the [facility] [place]. Please see a copy of my visit note below.      Video-Visit Details    Type of service:  Video Visit  Video Start Time: 9:35  Video End Time:10:02  Originating Location (pt. Location): [place], [place]  Distant Location (provider location):  Home  Platform used for Video Visit: Zoom    Bettie Vega MD      Alfredo Brewer is a 42 year old yo male who presents today via billable video visit for follow-up of hypopituitarism OF note, he has past medical history of childhood adult leukemia s/p chemo/XRT in remission since age 3, also vertigo, chronic headaches, HLD.  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 Now following with Dr Pimentel oncology for monitoring/maintenance.  Noted on MRI brain to have small range pituitary gland, came for evaluation. Of note, had normal puberty/growth.   Testing revealed low T4 (confirmed by T4 free equilibrium) and low testosterone. Other axes intact. See note from 5/2022 for full ROS/HPI    2) Central Hypothyroidism  Current regimen- Levothyroxine 50mcg    3) Central hypogonadism  - Low testosterone level, pending trial for conception. Completed testing in 2019 (as below) with male fertility specialist at [facility] with reportedly normal sperm at that time with very similar testosterone levels to now      August 23, 2019 ([facility] testing, [facility])  Prolactin 5.2  Testosterone Total 304  (200-745)  Free Testosterone 9.1 (3.1-12.8)  Bioavailable Testo 214.0 (71.7-300.0)  SHBG 13 (16-70)  LH- 3  Estradiol- 13  FSH- 7.7    5/7/2019  Testosterone 172        Active diagnoses this visit:  Central hypothyroidism     ROS: 10 point ROS neg other than the symptoms noted above in the HPI.      Medical, surgical, social, and family histories, medications and allergies reviewed and updated.    Objective:  Physical Exam (visual exam)  VS:  no vital signs taken for video visit  CONSTITUTIONAL: healthy, alert and NAD, responding appropriately  ENT: normocephalic, no visual evidence of trauma, normal nose and oral mucosa  EYES: conjunctivae and sclerae normal, no exophthalmos or proptosis  THYROID:  no visualized nodules or goiter  LUNGS: no audible wheeze, cough or visible cyanosis, no visible retractions or increased work of breathing  EXTREMITIES: no hand tremors  NEUROLOGY: cranial nerves grossly intact with no obvious deficit.  SKIN:  no visualized skin lesions or rash, no edema visualized  PSYCH: mentation appears normal, normal judgement          Lab Results   Component Value Date/Time    TSH 1.36 05/24/2022 07:50 AM    TSH 1.93 06/03/2021 07:40 AM    T4 0.71 (L) 05/24/2022 07:50 AM    LH 3.2 05/24/2022 07:50 AM    FSH 7.7 05/24/2022 07:50 AM       Last Comprehensive Metabolic Panel:  Sodium   Date Value Ref Range Status   06/03/2021 135 133 - 144 mmol/L Final     Potassium   Date Value Ref Range Status   06/03/2021 4.1 3.4 - 5.3 mmol/L Final     Chloride   Date Value Ref Range Status   06/03/2021 104 94 - 109 mmol/L Final     Carbon Dioxide   Date Value Ref Range Status   06/03/2021 28 20 - 32 mmol/L Final     Anion Gap   Date Value Ref Range Status   06/03/2021 3 3 - 14 mmol/L Final     Glucose   Date Value Ref Range Status   06/03/2021 91 70 - 99 mg/dL Final     Urea Nitrogen   Date Value Ref Range Status   06/03/2021 21 7 - 30 mg/dL Final     Creatinine   Date Value Ref Range Status   06/03/2021 1.12 0.66  - 1.25 mg/dL Final     GFR Estimate   Date Value Ref Range Status   06/03/2021 81 >60 mL/min/[1.73_m2] Final     Comment:     Non  GFR Calc  Starting 12/18/2018, serum creatinine based estimated GFR (eGFR) will be   calculated using the Chronic Kidney Disease Epidemiology Collaboration   (CKD-EPI) equation.       Calcium   Date Value Ref Range Status   06/03/2021 9.1 8.5 - 10.1 mg/dL Final     Bilirubin Total   Date Value Ref Range Status   06/03/2021 0.4 0.2 - 1.3 mg/dL Final     Alkaline Phosphatase   Date Value Ref Range Status   06/03/2021 71 40 - 150 U/L Final     ALT   Date Value Ref Range Status   06/03/2021 34 0 - 70 U/L Final     AST   Date Value Ref Range Status   06/03/2021 23 0 - 45 U/L Final     MRI Brain w/w/o contrast 2/9/2022:  FINDINGS: No abnormal intracranial restricted diffusion identified to  suggest acute infarct. The ventricles are normal in size and  configuration. A few scattered punctate nonspecific foci of T2 FLAIR  hyperintense signal are seen in the cerebral white matter (for  example, see series 4, images 17 and 21), not appearing significantly  changed. Although nonspecific, these can represent early sequela of  chronic small vessel ischemic change, and this extent would be  considered within normal limits for a patient of this age. Unchanged  borderline small size of the pituitary gland for age with pituitary  height of approximately 3.5 mm (series 7 image 13). Otherwise, the  morphology, volume, and signal intensity of the brain parenchyma are  within normal limits for age. The major arterial flow voids of the  skull base are grossly maintained. No evidence for intracranial  hemorrhage, extra-axial fluid collection, mass lesion, or abnormal  intracranial postcontrast enhancement.     The orbits appear within normal limits, accounting for limitations of  technique. The calvarium, skull base and mid face otherwise appear  grossly unremarkable.                                                                       IMPRESSION:  1. No acute intracranial process. No significant change compared to  brain MRI of 6/21/2019. Specifically, no intracranial hemorrhage, mass  lesion, infarct, or abnormal enhancement identified.  2. A few punctate nonspecific foci of T2 FLAIR hyperintense signal  noted in the cerebral white matter.  3. Borderline small size of the pituitary gland for the patient's age.  This can be a normal variant, but also can be related to pituitary  atrophy or hypoplasia. Given the patient's history of whole brain  radiation, this could represent pituitary atrophy. Recommend  clinical/laboratory correlation for any evidence to suggest  hypothalamic/pituitary endocrine dysfunction.  4. Otherwise unremarkable brain MRI.        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 Recommended treatment with clomiphene vs hcg injections instead of testosterone given his current goals.   - He will notify me if difficulty conceiving or interested in trialing sperm count to see if needed        Orders Placed This Encounter   Procedures     T3 total     T4 free     TSH        Return to clinic 3 months    A total of 37 minutes were spent today 08/02/22 on this visit including chart review, history and counseling, documentation and other activities as detailed above.     Answers for HPI/ROS submitted by the patient on 8/1/2022  General Symptoms: Yes  Skin Symptoms: No  HENT Symptoms: No  EYE SYMPTOMS: No  HEART SYMPTOMS: Yes  LUNG SYMPTOMS: No  INTESTINAL SYMPTOMS: Yes  URINARY SYMPTOMS: No  REPRODUCTIVE SYMPTOMS: No  SKELETAL SYMPTOMS: Yes  BLOOD SYMPTOMS: No  NERVOUS  SYSTEM SYMPTOMS: Yes  MENTAL HEALTH SYMPTOMS: No  Fever: No  Loss of appetite: No  Weight loss: No  Weight gain: No  Fatigue: Yes  Night sweats: No  Chills: No  Increased stress: Yes  Excessive hunger: No  Excessive thirst: No  Feeling hot or cold when others believe the temperature is normal: No  Loss of height: No  Post-operative complications: No  Surgical site pain: No  Hallucinations: No  Change in or Loss of Energy: No  Hyperactivity: No  Confusion: No  Chest pain or pressure: No  Fast or irregular heartbeat: No  Pain in legs with walking: No  Trouble breathing while lying down: No  Fingers or toes appear blue: No  High blood pressure: Yes  Low blood pressure: No  Fainting: No  Murmurs: No  Pacemaker: No  Varicose veins: No  Edema or swelling: No  Wake up at night with shortness of breath: No  Light-headedness: Yes  Exercise intolerance: No  Heart burn or indigestion: Yes  Nausea: No  Vomiting: No  Abdominal pain: No  Bloating: Yes  Constipation: Yes  Diarrhea: No  Blood in stool: No  Black stools: No  Rectal or Anal pain: No  Fecal incontinence: No  Yellowing of skin or eyes: No  Vomit with blood: No  Change in stools: No  Back pain: No  Muscle aches: Yes  Neck pain: Yes  Swollen joints: No  Joint pain: No  Bone pain: No  Muscle cramps: No  Muscle weakness: No  Joint stiffness: No  Bone fracture: No  Trouble with coordination: No  Dizziness or trouble with balance: Yes  Fainting or black-out spells: No  Memory loss: No  Headache: Yes  Seizures: No  Speech problems: No  Tingling: No  Tremor: No  Weakness: No  Difficulty walking: No  Paralysis: No  Numbness: No          Again, thank you for allowing me to participate in the care of your patient.        Sincerely,        Bettie Vega MD

## 2022-08-26 ENCOUNTER — LAB (OUTPATIENT)
Dept: LAB | Facility: CLINIC | Age: 42
End: 2022-08-26
Payer: COMMERCIAL

## 2022-08-26 DIAGNOSIS — E03.8 CENTRAL HYPOTHYROIDISM: ICD-10-CM

## 2022-08-26 LAB
T3 SERPL-MCNC: 86 NG/DL (ref 85–202)
T4 FREE SERPL-MCNC: 0.95 NG/DL (ref 0.76–1.46)
TSH SERPL DL<=0.005 MIU/L-ACNC: 0.9 MU/L (ref 0.4–4)

## 2022-08-26 PROCEDURE — 36415 COLL VENOUS BLD VENIPUNCTURE: CPT

## 2022-08-26 PROCEDURE — 84480 ASSAY TRIIODOTHYRONINE (T3): CPT

## 2022-08-26 PROCEDURE — 84439 ASSAY OF FREE THYROXINE: CPT

## 2022-08-26 PROCEDURE — 84443 ASSAY THYROID STIM HORMONE: CPT

## 2022-08-29 NOTE — RESULT ENCOUNTER NOTE
Dear Alfredo,     Here are your recent results. TFTS now in the normal range-- would consider increasing your dose to 75mcg to get your Free T4 above 1, but wanted to see how you're feeling first    Please let us know if you have any questions or concerns.    Regards,  Bettie Vega MD

## 2022-08-30 DIAGNOSIS — E03.8 CENTRAL HYPOTHYROIDISM: Primary | ICD-10-CM

## 2022-08-30 RX ORDER — LEVOTHYROXINE SODIUM 75 UG/1
75 TABLET ORAL DAILY
Qty: 90 TABLET | Refills: 3 | Status: SHIPPED | OUTPATIENT
Start: 2022-08-30 | End: 2022-10-10

## 2022-10-04 ENCOUNTER — LAB (OUTPATIENT)
Dept: LAB | Facility: CLINIC | Age: 42
End: 2022-10-04
Payer: COMMERCIAL

## 2022-10-04 DIAGNOSIS — E03.8 CENTRAL HYPOTHYROIDISM: ICD-10-CM

## 2022-10-04 LAB
T3 SERPL-MCNC: 102 NG/DL (ref 85–202)
T4 FREE SERPL-MCNC: 1.03 NG/DL (ref 0.76–1.46)
TSH SERPL DL<=0.005 MIU/L-ACNC: 0.12 MU/L (ref 0.4–4)

## 2022-10-04 PROCEDURE — 84480 ASSAY TRIIODOTHYRONINE (T3): CPT

## 2022-10-04 PROCEDURE — 36415 COLL VENOUS BLD VENIPUNCTURE: CPT

## 2022-10-04 PROCEDURE — 84443 ASSAY THYROID STIM HORMONE: CPT

## 2022-10-04 PROCEDURE — 84439 ASSAY OF FREE THYROXINE: CPT

## 2022-10-06 NOTE — RESULT ENCOUNTER NOTE
Dear Alfredo,     Here are your recent results which are within the expected range-- how are you feeling we can also consider a slight dose increase if you're noticing no difference. Please continue with your current plan of care and let us know if you have any questions or concerns.    Regards,  Bettie Vega MD

## 2022-10-10 ENCOUNTER — HEALTH MAINTENANCE LETTER (OUTPATIENT)
Age: 42
End: 2022-10-10

## 2022-10-10 DIAGNOSIS — E03.8 CENTRAL HYPOTHYROIDISM: Primary | ICD-10-CM

## 2022-10-10 RX ORDER — LEVOTHYROXINE SODIUM 88 UG/1
88 TABLET ORAL DAILY
Qty: 90 TABLET | Refills: 3 | Status: SHIPPED | OUTPATIENT
Start: 2022-10-10 | End: 2022-11-28

## 2022-11-08 ENCOUNTER — HOSPITAL ENCOUNTER (OUTPATIENT)
Dept: CARDIOLOGY | Facility: CLINIC | Age: 42
Discharge: HOME | End: 2022-11-08
Attending: INTERNAL MEDICINE | Admitting: INTERNAL MEDICINE
Payer: COMMERCIAL

## 2022-11-08 DIAGNOSIS — I42.9 SECONDARY CARDIOMYOPATHY (H): ICD-10-CM

## 2022-11-08 DIAGNOSIS — R00.0 SINUS TACHYCARDIA: ICD-10-CM

## 2022-11-08 DIAGNOSIS — Z92.21 STATUS POST CHEMOTHERAPY: ICD-10-CM

## 2022-11-08 DIAGNOSIS — Z85.6 HISTORY OF MYELOID LEUKEMIA: ICD-10-CM

## 2022-11-08 DIAGNOSIS — C92.01 AML (ACUTE MYELOID LEUKEMIA) IN REMISSION (H): ICD-10-CM

## 2022-11-08 DIAGNOSIS — E78.00 HYPERCHOLESTEREMIA: ICD-10-CM

## 2022-11-08 LAB — LVEF ECHO: NORMAL

## 2022-11-08 PROCEDURE — 93321 DOPPLER ECHO F-UP/LMTD STD: CPT

## 2022-11-08 PROCEDURE — 93308 TTE F-UP OR LMTD: CPT | Mod: 26 | Performed by: INTERNAL MEDICINE

## 2022-11-08 PROCEDURE — 93325 DOPPLER ECHO COLOR FLOW MAPG: CPT | Mod: 26 | Performed by: INTERNAL MEDICINE

## 2022-11-08 PROCEDURE — 93321 DOPPLER ECHO F-UP/LMTD STD: CPT | Mod: 26 | Performed by: INTERNAL MEDICINE

## 2022-11-08 PROCEDURE — 93325 DOPPLER ECHO COLOR FLOW MAPG: CPT

## 2022-11-17 ENCOUNTER — LAB (OUTPATIENT)
Dept: LAB | Facility: CLINIC | Age: 42
End: 2022-11-17
Payer: COMMERCIAL

## 2022-11-17 DIAGNOSIS — E03.8 CENTRAL HYPOTHYROIDISM: ICD-10-CM

## 2022-11-17 LAB
T3 SERPL-MCNC: 101 NG/DL (ref 85–202)
T4 FREE SERPL-MCNC: 1.28 NG/DL (ref 0.9–1.7)
TSH SERPL DL<=0.005 MIU/L-ACNC: 0.04 UIU/ML (ref 0.3–4.2)

## 2022-11-17 PROCEDURE — 36415 COLL VENOUS BLD VENIPUNCTURE: CPT

## 2022-11-17 PROCEDURE — 84439 ASSAY OF FREE THYROXINE: CPT

## 2022-11-17 PROCEDURE — 84443 ASSAY THYROID STIM HORMONE: CPT

## 2022-11-17 PROCEDURE — 84480 ASSAY TRIIODOTHYRONINE (T3): CPT

## 2022-11-22 ENCOUNTER — MYC MEDICAL ADVICE (OUTPATIENT)
Dept: ENDOCRINOLOGY | Facility: CLINIC | Age: 42
End: 2022-11-22

## 2022-11-22 DIAGNOSIS — E03.8 CENTRAL HYPOTHYROIDISM: ICD-10-CM

## 2022-11-22 NOTE — RESULT ENCOUNTER NOTE
Dear Alfredo,     Here are your recent results which are within an acceptable range. Please continue with your current plan of care and let us know if you have any questions or concerns or are not feeling well.    Regards,  Bettie Vega MD

## 2022-11-27 ENCOUNTER — HEALTH MAINTENANCE LETTER (OUTPATIENT)
Age: 42
End: 2022-11-27

## 2022-11-28 RX ORDER — LEVOTHYROXINE SODIUM 75 UG/1
75 TABLET ORAL DAILY
Qty: 90 TABLET | Refills: 1 | Status: SHIPPED | OUTPATIENT
Start: 2022-11-28 | End: 2023-04-03

## 2022-11-28 NOTE — TELEPHONE ENCOUNTER
Per patient request, Dr. Vega decreased levothyroxine from 88 mcg daily to 75 mcg.   Sent new Rx for levothyroxine 75 mcg to [facility] pharmacy. Notified patient via Recommendi.   Solange MUJICA, BSN  11/28/22 9:39 AM

## 2022-12-23 ENCOUNTER — OFFICE VISIT (OUTPATIENT)
Dept: CARDIOLOGY | Facility: CLINIC | Age: 42
End: 2022-12-23
Attending: INTERNAL MEDICINE
Payer: COMMERCIAL

## 2022-12-23 VITALS
WEIGHT: 175.3 LBS | SYSTOLIC BLOOD PRESSURE: 128 MMHG | DIASTOLIC BLOOD PRESSURE: 86 MMHG | BODY MASS INDEX: 26.57 KG/M2 | HEART RATE: 88 BPM | HEIGHT: 68 IN

## 2022-12-23 DIAGNOSIS — Z92.21 STATUS POST CHEMOTHERAPY: ICD-10-CM

## 2022-12-23 DIAGNOSIS — Z85.6 HISTORY OF MYELOID LEUKEMIA: ICD-10-CM

## 2022-12-23 DIAGNOSIS — C92.01 AML (ACUTE MYELOID LEUKEMIA) IN REMISSION (H): ICD-10-CM

## 2022-12-23 DIAGNOSIS — R00.0 SINUS TACHYCARDIA: ICD-10-CM

## 2022-12-23 DIAGNOSIS — I10 BENIGN ESSENTIAL HYPERTENSION: ICD-10-CM

## 2022-12-23 DIAGNOSIS — E78.00 HYPERCHOLESTEREMIA: ICD-10-CM

## 2022-12-23 DIAGNOSIS — I42.9 SECONDARY CARDIOMYOPATHY (H): Primary | ICD-10-CM

## 2022-12-23 PROCEDURE — 99214 OFFICE O/P EST MOD 30 MIN: CPT | Performed by: PHYSICIAN ASSISTANT

## 2022-12-23 RX ORDER — CARVEDILOL 6.25 MG/1
9.38 TABLET ORAL 2 TIMES DAILY WITH MEALS
Qty: 270 TABLET | Refills: 3 | Status: SHIPPED | OUTPATIENT
Start: 2022-12-23 | End: 2023-01-30

## 2022-12-23 NOTE — PROGRESS NOTES
CARDIOLOGY CLINIC PROGRESS NOTE    DOS: 2022      Alfredo Brewer  : 1980, 42 year old  MRN: [id number]      History:  42 year old male who received high-dose Adriamycin for acute leukemia in .  No radiation to the chest, radiation to the brain was delivered. He states that in his teen years, he had some cardiomyopathy and was put on lisinopril which improved the cardiomyopathy, though has not been on since his 20s.      He has hypercholesterolemia, HTN, hypothyroidism, migraines.    Interval History:   He was diagnosed with hypothyroidism and put on levothyroxine.     Sept/Oct he was having frequent vertigo spells.   His amitriptyline was increased and since then he has had no further migraines.    Also, his levothyroxine was decreased.   No chest pain.   No edema.       ROS:  Skin:  not assessed     Eyes:  not assessed    ENT:  Positive for vertigo  Respiratory:  Negative    Cardiovascular:    lightheadedness;Positive for;dizziness;fatigue  Gastroenterology: not assessed    Genitourinary:  not assessed    Musculoskeletal:  not assessed    Neurologic:  not assessed    Psychiatric:  not assessed    Heme/Lymph/Imm:  not assessed    Endocrine:  not assessed      PAST MEDICAL HISTORY:  No past medical history on file.    PAST SURGICAL HISTORY:  No past surgical history on file.    SOCIAL HISTORY:  Social History     Socioeconomic History     Marital status:      Spouse name: None     Number of children: None     Years of education: None     Highest education level: None   Tobacco Use     Smoking status: Never     Smokeless tobacco: Never   Substance and Sexual Activity     Alcohol use: Yes     Comment: a few beers a week       FAMILY HISTORY:  History reviewed. No pertinent family history.    MEDS: albuterol (PROAIR HFA/PROVENTIL HFA/VENTOLIN HFA) 108 (90 Base) MCG/ACT inhaler, Inhale 1-2 puffs into the lungs  amitriptyline (ELAVIL) 25 MG tablet, Take 30 mg by mouth At Bedtime  FISH  "OIL-VITAMIN D PO, Take by mouth daily  lactobacillus rhamnosus, GG, (CULTURELL) capsule, Take 1 capsule by mouth daily  levothyroxine (SYNTHROID/LEVOTHROID) 75 MCG tablet, Take 1 tablet (75 mcg) by mouth daily  meclizine (ANTIVERT) 25 MG tablet, Take 1 tablet (25 mg) by mouth every 6 hours as needed for dizziness  omeprazole (PRILOSEC) 20 MG DR capsule, 20 mg daily  rosuvastatin (CRESTOR) 40 MG tablet, Take 1 tablet (40 mg) by mouth daily    No current facility-administered medications on file prior to visit.      ALLERGIES:   Allergies   Allergen Reactions     Codeine      Nausea and vomiting     Erythromycin      Stomach pain     Prednisone      Stomach pain     Vicodin [Acetaminophen]      Stomach pain     Wheat Bran GI Disturbance       PHYSICAL EXAM:  Vitals: /86   Pulse 88   Ht 1.715 m (5' 7.5\")   Wt 79.5 kg (175 lb 4.8 oz)   BMI 27.05 kg/m    Constitutional:  cooperative, alert and oriented, well developed, well nourished, in no acute distress        Skin:  warm and dry to the touch, no apparent skin lesions or masses noted        Head:  normocephalic, no masses or lesions        Eyes:  pupils equal and round;conjunctivae and lids unremarkable;sclera white;no xanthalasma        ENT:  no pallor or cyanosis        Neck:  JVP normal        Respiratory:  normal symmetry;clear to auscultation        Cardiac: regular rhythm;no murmurs, gallops or rubs detected;normal S1 and S2                  GI:  BS normoactive;abdomen soft        Vascular: pulses full and equal                                      Extremities and Musculoskeletal:  no edema        Neurological:  no gross motor deficits;affect appropriate              LABS/DATA:  I reviewed the following:  Echo 11/8/22:  Interpretation Summary  The visual ejection fraction is 55-60%.  Global peak LV longitudinal strain is averaged at -20.6%. This is within  reported normal limits (normal <-18%).  The right ventricle is normal in size and function.  No " significant valve disease.  The ascending aorta is borderline dilated at 3.8 cms.     There is a small echodensity noted along the distal lateral wall of the LV.  This is not a thrombus (highly unlikely given the location), and most likely  this is an artifact or a inferiorly displaced or accessory papillary muscle.  Images 21 and 24. Recommend to repeat very limited echo with contrast to  evaluate that.      Echo 5/11/22:  Interpretation Summary  The visual ejection fraction is 50-55%. There is borderline global hypokinesia  of the left ventricle.  The right ventricle is normal in size and function.  No hemodynamically significant valvular disease.  There is no pericardial effusion.     No prior studies for comparison. Strain not performed.            ASSESSMENT/PLAN:  1. H/o mild cardiomyopathy since a teen secondary to high dose Adriamycin   - LVEF 50-55% on echocardiogram 5/2022  - Started Coreg 6.25 mg BID.  HR and BP are better.    - Repeat echo 11/2022 shows LVEF 55-60% with normal strain  - Increase Coreg further to 9.375 mg BID  - RN visit in 1 month. If BP room, increase further to 12.5 mg BID  - Will discuss frequency of repeat echos with Dr. Curtis        2. Hypertension, see above  - Improved on Coreg, as above        3. Hypercholesterolemia  - On Crestor 20 mg, LDL was 91.  Crestor increased to 40 mg, and LDL is 80. Will repeat FLP at 6 months. If LDL the same or higher, consider adding Zetia 10 mg with goal of trying to prevent any secondary ischemic cardiomyopathy in the future.      Follow up:  BP check in 1 month  FLP in May 2023  Will review timing of follow up echo and appts with Dr. Curtis * Will see annually with echo for now. If all is stable, we could move out to every other year with echo.          David Morejon PA-C

## 2022-12-23 NOTE — PATIENT INSTRUCTIONS
The repeat echo looks improved with EF 55-60%.      Your blood pressure looks improved as well, but there is still some room for improvement. Increase Coreg from 6.25 mg twice daily to 9.375 mg twice daily.  You can take 1.5 tablets twice daily.   See my nurse un about 1 month to check blood pressure. We may decide to increase Coreg to 12.5 mg twice daily.     Next May, repeat your fasting cholesterol panel.  If LDL is not further improved, we would consider adding Zetia 10 mg.

## 2022-12-23 NOTE — LETTER
2022    MD [address] [address] [address] [address] [place] [address]    RE: Alfredo Brewer       Dear Colleague,     I had the pleasure of seeing Alfredo Brewer in the [facility].      CARDIOLOGY CLINIC PROGRESS NOTE    DOS: 2022      Alfredo DEGROOT Osman  : 1980, 42 year old  MRN: [id number]      History:  42 year old male who received high-dose Adriamycin for acute leukemia in .  No radiation to the chest, radiation to the brain was delivered. He states that in his teen years, he had some cardiomyopathy and was put on lisinopril which improved the cardiomyopathy, though has not been on since his 20s.      He has hypercholesterolemia, HTN, hypothyroidism, migraines.    Interval History:   He was diagnosed with hypothyroidism and put on levothyroxine.     Sept/Oct he was having frequent vertigo spells.   His amitriptyline was increased and since then he has had no further migraines.    Also, his levothyroxine was decreased.   No chest pain.   No edema.       ROS:  Skin:  not assessed     Eyes:  not assessed    ENT:  Positive for vertigo  Respiratory:  Negative    Cardiovascular:    lightheadedness;Positive for;dizziness;fatigue  Gastroenterology: not assessed    Genitourinary:  not assessed    Musculoskeletal:  not assessed    Neurologic:  not assessed    Psychiatric:  not assessed    Heme/Lymph/Imm:  not assessed    Endocrine:  not assessed      PAST MEDICAL HISTORY:  No past medical history on file.    PAST SURGICAL HISTORY:  No past surgical history on file.    SOCIAL HISTORY:  Social History     Socioeconomic History     Marital status:      Spouse name: None     Number of children: None     Years of education: None     Highest education level: None   Tobacco Use     Smoking status: Never     Smokeless tobacco: Never   Substance and Sexual Activity     Alcohol use: Yes     Comment: a few beers a week       FAMILY HISTORY:  History  "reviewed. No pertinent family history.    MEDS: albuterol (PROAIR HFA/PROVENTIL HFA/VENTOLIN HFA) 108 (90 Base) MCG/ACT inhaler, Inhale 1-2 puffs into the lungs  amitriptyline (ELAVIL) 25 MG tablet, Take 30 mg by mouth At Bedtime  FISH OIL-VITAMIN D PO, Take by mouth daily  lactobacillus rhamnosus, GG, (CULTURELL) capsule, Take 1 capsule by mouth daily  levothyroxine (SYNTHROID/LEVOTHROID) 75 MCG tablet, Take 1 tablet (75 mcg) by mouth daily  meclizine (ANTIVERT) 25 MG tablet, Take 1 tablet (25 mg) by mouth every 6 hours as needed for dizziness  omeprazole (PRILOSEC) 20 MG DR capsule, 20 mg daily  rosuvastatin (CRESTOR) 40 MG tablet, Take 1 tablet (40 mg) by mouth daily    No current facility-administered medications on file prior to visit.      ALLERGIES:   Allergies   Allergen Reactions     Codeine      Nausea and vomiting     Erythromycin      Stomach pain     Prednisone      Stomach pain     Vicodin [Acetaminophen]      Stomach pain     Wheat Bran GI Disturbance       PHYSICAL EXAM:  Vitals: /86   Pulse 88   Ht 1.715 m (5' 7.5\")   Wt 79.5 kg (175 lb 4.8 oz)   BMI 27.05 kg/m    Constitutional:  cooperative, alert and oriented, well developed, well nourished, in no acute distress        Skin:  warm and dry to the touch, no apparent skin lesions or masses noted        Head:  normocephalic, no masses or lesions        Eyes:  pupils equal and round;conjunctivae and lids unremarkable;sclera white;no xanthalasma        ENT:  no pallor or cyanosis        Neck:  JVP normal        Respiratory:  normal symmetry;clear to auscultation        Cardiac: regular rhythm;no murmurs, gallops or rubs detected;normal S1 and S2                  GI:  BS normoactive;abdomen soft        Vascular: pulses full and equal                                      Extremities and Musculoskeletal:  no edema        Neurological:  no gross motor deficits;affect appropriate              LABS/DATA:  I reviewed the following:  Echo " 11/8/22:  Interpretation Summary  The visual ejection fraction is 55-60%.  Global peak LV longitudinal strain is averaged at -20.6%. This is within  reported normal limits (normal <-18%).  The right ventricle is normal in size and function.  No significant valve disease.  The ascending aorta is borderline dilated at 3.8 cms.     There is a small echodensity noted along the distal lateral wall of the LV.  This is not a thrombus (highly unlikely given the location), and most likely  this is an artifact or a inferiorly displaced or accessory papillary muscle.  Images 21 and 24. Recommend to repeat very limited echo with contrast to  evaluate that.      Echo 5/11/22:  Interpretation Summary  The visual ejection fraction is 50-55%. There is borderline global hypokinesia  of the left ventricle.  The right ventricle is normal in size and function.  No hemodynamically significant valvular disease.  There is no pericardial effusion.     No prior studies for comparison. Strain not performed.            ASSESSMENT/PLAN:  1. H/o mild cardiomyopathy since a teen secondary to high dose Adriamycin   - LVEF 50-55% on echocardiogram 5/2022  - Started Coreg 6.25 mg BID.  HR and BP are better.    - Repeat echo 11/2022 shows LVEF 55-60% with normal strain  - Increase Coreg further to 9.375 mg BID  - RN visit in 1 month. If BP room, increase further to 12.5 mg BID  - Will discuss frequency of repeat echos with Dr. Curtis        2. Hypertension, see above  - Improved on Coreg, as above        3. Hypercholesterolemia  - On Crestor 20 mg, LDL was 91.  Crestor increased to 40 mg, and LDL is 80. Will repeat FLP at 6 months. If LDL the same or higher, consider adding Zetia 10 mg with goal of trying to prevent any secondary ischemic cardiomyopathy in the future.      Follow up:  BP check in 1 month  FLP in May 2023  Will review timing of follow up echo and appts with Dr. Curtis * Will see annually with echo for now. If all is stable, we could move  out to every other year with echo.          David Morejon PA-C      Thank you for allowing me to participate in the care of your patient.      Sincerely,     David Morejon PA-C     [facility]  cc:   Harman Curtis MD  [address]  [place] [address]

## 2022-12-27 ENCOUNTER — TELEPHONE (OUTPATIENT)
Dept: CARDIOLOGY | Facility: CLINIC | Age: 42
End: 2022-12-27

## 2022-12-27 NOTE — TELEPHONE ENCOUNTER
----- Message from David Morejon PA-C sent at 12/23/2022 10:10 AM CST -----  Regarding: FW: follow up plans  Hey,   Can you let this patient know that I discussed with Dr. Curtis and we will see him back annually with echo (ordered) at least initially.  If stable for a couple years, then we may move out to every other year.   Thanks,   David      ----- Message -----  From: Harman Curtis MD  Sent: 12/23/2022   9:56 AM CST  To: David Morejon PA-C  Subject: RE: follow up plans                              Either annual with echo or every other year with echo is fine with me!          ----- Message -----  From: David Morejon PA-C  Sent: 12/23/2022   9:08 AM CST  To: Harman Curtis MD  Subject: follow up plans                                  Alfredo Pacheco is tolerating Coreg, and we are increasing today with plans to increase further as able.   His echo looked good, too.   FLP showed LDL went from 91 to 80 with increase in Crestor from 20 to 40.     How often would you recommend seeing him back? Annually with echo? Or every other year with echo?    David

## 2022-12-27 NOTE — TELEPHONE ENCOUNTER
Detailed VM left for patient regarding recommendations moving foreword. Call back number provided for nay further questions.  Babs MARTELL RN  12/27/22 at 3:08 PM

## 2023-01-19 ENCOUNTER — LAB (OUTPATIENT)
Dept: LAB | Facility: CLINIC | Age: 43
End: 2023-01-19
Payer: COMMERCIAL

## 2023-01-19 DIAGNOSIS — E03.8 CENTRAL HYPOTHYROIDISM: ICD-10-CM

## 2023-01-19 LAB
T3 SERPL-MCNC: 103 NG/DL (ref 85–202)
T4 FREE SERPL-MCNC: 1.23 NG/DL (ref 0.9–1.7)
TSH SERPL DL<=0.005 MIU/L-ACNC: 0.07 UIU/ML (ref 0.3–4.2)

## 2023-01-19 PROCEDURE — 84480 ASSAY TRIIODOTHYRONINE (T3): CPT

## 2023-01-19 PROCEDURE — 84443 ASSAY THYROID STIM HORMONE: CPT

## 2023-01-19 PROCEDURE — 36415 COLL VENOUS BLD VENIPUNCTURE: CPT

## 2023-01-19 PROCEDURE — 84439 ASSAY OF FREE THYROXINE: CPT

## 2023-01-24 ENCOUNTER — ALLIED HEALTH/NURSE VISIT (OUTPATIENT)
Dept: CARDIOLOGY | Facility: CLINIC | Age: 43
End: 2023-01-24
Payer: COMMERCIAL

## 2023-01-24 ENCOUNTER — DOCUMENTATION ONLY (OUTPATIENT)
Dept: CARDIOLOGY | Facility: CLINIC | Age: 43
End: 2023-01-24

## 2023-01-24 VITALS — HEART RATE: 89 BPM | OXYGEN SATURATION: 97 % | DIASTOLIC BLOOD PRESSURE: 85 MMHG | SYSTOLIC BLOOD PRESSURE: 129 MMHG

## 2023-01-24 DIAGNOSIS — R00.0 SINUS TACHYCARDIA: ICD-10-CM

## 2023-01-24 DIAGNOSIS — I10 BENIGN ESSENTIAL HYPERTENSION: ICD-10-CM

## 2023-01-24 PROCEDURE — 99207 PR NO CHARGE LOS: CPT

## 2023-01-24 NOTE — PROGRESS NOTES
[facility] BLOOD PRESSURE CHECK     Last office visit: 12/23/22 with David Morejon    Previous blood pressure: 128/86 mm Hg  Previous heart rate: 88 bpm      Time of visit: 3:14 pm    Morning medications were taken at: 7:30 AM and Evening at 6:30 PM     Today's blood pressure: 129/85 mm Hg  Today's heart rate: 89 bpm     Home monitor blood pressure: N/A mmHg  Home monitor heart rate: N/A bpm      Additional Comments:  ~Pt does not monitor BP at home.   ~The in crease in Coreg has been going well no issues.  ~ Pt has bought a fitness tracker and states BP wjile at rest have been in the 70's and with exertion 120's.     ~Pt is looking for any other recommendations for heart health. I did give him the Mediterranean Diet hand out.         Results routed to: David Messina/Babs Esquivel      Ordering Provider: David Morejon   In clinic Provider: Dr. Albert

## 2023-01-27 NOTE — PROGRESS NOTES
Vitals look good.   He can increase Coreg to 12.5 mg BID.     Follow up as already ordered.   David Morejon PA-C 1/27/2023 4:23 PM

## 2023-01-30 RX ORDER — CARVEDILOL 12.5 MG/1
12.5 TABLET ORAL 2 TIMES DAILY WITH MEALS
Qty: 180 TABLET | Refills: 2
Start: 2023-01-30 | End: 2023-02-23

## 2023-01-30 NOTE — PROGRESS NOTES
Results and recommendations reviewed with patient. New prescription sent for carvedilol 12.5 mg BID.   Babs MARTELL RN  01/30/23 at 10:39 AM

## 2023-02-09 ENCOUNTER — TELEPHONE (OUTPATIENT)
Dept: CARDIOLOGY | Facility: CLINIC | Age: 43
End: 2023-02-09
Payer: COMMERCIAL

## 2023-02-09 DIAGNOSIS — R00.0 SINUS TACHYCARDIA: ICD-10-CM

## 2023-02-09 NOTE — TELEPHONE ENCOUNTER
Call placed to patient to review symptoms patient reported. Patient stated since increasing Coreg to 12.5 mg BID he was dizzy to the point of missing work fearing it was unsafe for him to drive. Patient stated he decreased his Coreg and his symptoms have gone way. Tried to review what dose of Coreg patient was taking. It is unclear if he is taking 12.5 mg once daily or 6.125 mg BID. Patient stated he does not know the dose of the pill he is taking and was not home to clarify. Patient also stated he has not taken his blood pressure in a couple of weeks. Instructed patient to check the pill bottle for the dose he is taking and the check his blood pressure 1-2 hours after taking medications. Patient will call back with results.     -LOV 12/23/22 with Anah: Coreg was increased from 6.25 mg BID to 9.375 mg BID (1.5 tablets BID) with a repeat blood pressure check in 1 month.   -Per blood pressure check/prgress note 1/24/23 - Anah Morejon increased patient coreg to 12.5 mg BID.     Babs MARTELL RN  02/09/23 at 11:45 AM

## 2023-02-09 NOTE — TELEPHONE ENCOUNTER
[facility] Call [facility]    Phone Message    May a detailed message be left on voicemail: yes     Reason for Call: Symptoms or Concerns     If patient has red-flag symptoms, warm transfer to triage line    Current symptom or concern: Pt states after increasing the dosage of carvedilol (COREG) 12.5 MG tablet pt was having extreme dizziness to the point he missed two days of work last week because he felt unable to drive. Pt wanted to inform us that he has cut back and started taking 1 pill per day again instead of 2 as of 02/02/23. Pt states he is not having dizziness since he went back to original dosage. Please return call to discuss.     Symptoms have been present for:  A few  day(s)    Has patient previously been seen for this? No    Are there any new or worsening symptoms? No      Action Taken: Other: Cardiology    Travel Screening: Not Applicable     Thank you!  [facility]

## 2023-02-15 NOTE — TELEPHONE ENCOUNTER
[employer] message sent to patient to see if patient verified coreg dose and had any blood pressures to report.  Babs MARTELL RN  02/15/23 at 12:47 PM

## 2023-02-21 NOTE — TELEPHONE ENCOUNTER
Call placed to patient to retreive blood pressure readings. OLI.  Babs MARTELL RN  02/21/23 at 12:02 PM

## 2023-02-21 NOTE — TELEPHONE ENCOUNTER
Patient returned call to report current blood pressures. Most recent readings taken 2 hours after morning dose were 131/86, 80, 123/85, 82, 129/87. Patient reports he decreased his Coreg to 6.125 BID.    -LOV 12/23/22 with Anah: Coreg was increased from 6.25 mg BID to 9.375 mg BID (1.5 tablets BID) with a repeat blood pressure check in 1 month.   -Per blood pressure check/prgress note 1/24/23 - Anah Morejon increased patient coreg to 12.5 mg BID.  -Telephone encounter 2/9/23  - Patient called to report that when he started increase dose he was dizzy to the point of missing work fearing it was unsafe for him to drive.    Patient stated he is willing to go back to the 9.375 BID (1.5 tablets BID) if Anah is okay with that as he did not have any symptoms with this dose. Patient stated he will stay in the 6.125 BID right now until he hears back. Will route to provider for review and recommendations.  Babs MARTELL RN  02/21/23 at 4:18 PM

## 2023-02-23 RX ORDER — CARVEDILOL 6.25 MG/1
9.38 TABLET ORAL 2 TIMES DAILY WITH MEALS
Qty: 270 TABLET | Refills: 2 | Status: SHIPPED | OUTPATIENT
Start: 2023-02-23 | End: 2023-12-20

## 2023-04-03 DIAGNOSIS — E03.8 CENTRAL HYPOTHYROIDISM: ICD-10-CM

## 2023-04-03 RX ORDER — LEVOTHYROXINE SODIUM 75 UG/1
TABLET ORAL
Qty: 90 TABLET | Refills: 0 | Status: SHIPPED | OUTPATIENT
Start: 2023-04-03 | End: 2023-06-28

## 2023-04-03 NOTE — TELEPHONE ENCOUNTER
"Last Written Prescription Date:  11/28/22  Last Fill Quantity: 90,  # refills: 1   Last office visit:8/2/22 with Dr. Vega  Future Office Visit:  Pt was due back 11/22        Requested Prescriptions   Pending Prescriptions Disp Refills     levothyroxine (SYNTHROID/LEVOTHROID) 75 MCG tablet [Pharmacy Med Name: LEVOTHYROXINE 75 MCG TABLET] 90 tablet 1     Sig: TAKE 1 TABLET BY MOUTH EVERY DAY       Thyroid Protocol Failed - 4/3/2023  8:04 AM        Failed - Normal TSH on file in past 12 months     Recent Labs   Lab Test 01/19/23  0802   TSH 0.07*              Passed - Patient is 12 years or older        Passed - Recent (12 mo) or future (30 days) visit within the authorizing provider's specialty     Patient has had an office visit with the authorizing provider or a provider within the authorizing providers department within the previous 12 mos or has a future within next 30 days. See \"Patient Info\" tab in inbasket, or \"Choose Columns\" in Meds & Orders section of the refill encounter.              Passed - Medication is active on med list           Refill sent with reminder that appt needed for add'l refills.  Clarita Salazar RN    "

## 2023-04-06 DIAGNOSIS — C92.01 AML (ACUTE MYELOID LEUKEMIA) IN REMISSION (H): ICD-10-CM

## 2023-04-06 DIAGNOSIS — Z92.21 STATUS POST CHEMOTHERAPY: ICD-10-CM

## 2023-04-06 DIAGNOSIS — Z85.6 HISTORY OF MYELOID LEUKEMIA: ICD-10-CM

## 2023-04-06 DIAGNOSIS — E78.00 HYPERCHOLESTEREMIA: ICD-10-CM

## 2023-04-06 RX ORDER — ROSUVASTATIN CALCIUM 40 MG/1
40 TABLET, COATED ORAL DAILY
Qty: 90 TABLET | Refills: 0 | Status: SHIPPED | OUTPATIENT
Start: 2023-04-06 | End: 2023-10-09

## 2023-05-23 ENCOUNTER — LAB (OUTPATIENT)
Dept: LAB | Facility: CLINIC | Age: 43
End: 2023-05-23
Payer: COMMERCIAL

## 2023-05-23 DIAGNOSIS — E78.00 HYPERCHOLESTEREMIA: ICD-10-CM

## 2023-05-23 LAB
CHOLEST SERPL-MCNC: 179 MG/DL
HDLC SERPL-MCNC: 40 MG/DL
LDLC SERPL CALC-MCNC: 59 MG/DL
NONHDLC SERPL-MCNC: 139 MG/DL
TRIGL SERPL-MCNC: 399 MG/DL

## 2023-05-23 PROCEDURE — 80061 LIPID PANEL: CPT | Performed by: PHYSICIAN ASSISTANT

## 2023-05-23 PROCEDURE — 36415 COLL VENOUS BLD VENIPUNCTURE: CPT | Performed by: PHYSICIAN ASSISTANT

## 2023-06-28 DIAGNOSIS — E03.8 CENTRAL HYPOTHYROIDISM: ICD-10-CM

## 2023-06-28 RX ORDER — LEVOTHYROXINE SODIUM 75 UG/1
TABLET ORAL
Qty: 90 TABLET | Refills: 0 | Status: SHIPPED | OUTPATIENT
Start: 2023-06-28 | End: 2023-09-27

## 2023-06-28 NOTE — TELEPHONE ENCOUNTER
"Last Written Prescription Date:  4/3/23  Last Fill Quantity: 90,  # refills: 0   Last office visit:  8/2/2022 with prescribing provider:  Dr. Vega   Future Office Visit:  Was due back 11/22        Requested Prescriptions   Pending Prescriptions Disp Refills     levothyroxine (SYNTHROID/LEVOTHROID) 75 MCG tablet [Pharmacy Med Name: LEVOTHYROXINE 75 MCG TABLET] 90 tablet 0     Sig: TAKE 1 TABLET BY MOUTH EVERY DAY       Thyroid Protocol Failed - 6/28/2023  7:06 AM        Failed - Normal TSH on file in past 12 months     Recent Labs   Lab Test 01/19/23  0802   TSH 0.07*              Passed - Patient is 12 years or older        Passed - Recent (12 mo) or future (30 days) visit within the authorizing provider's specialty     Patient has had an office visit with the authorizing provider or a provider within the authorizing providers department within the previous 12 mos or has a future within next 30 days. See \"Patient Info\" tab in inbasket, or \"Choose Columns\" in Meds & Orders section of the refill encounter.              Passed - Medication is active on med list           Refill sent with reminder to schedule appt  Will also have FD call pt to help schedule follow up with Dr. Silvia Salazar, SIL    "

## 2023-09-27 DIAGNOSIS — E03.8 CENTRAL HYPOTHYROIDISM: ICD-10-CM

## 2023-09-27 RX ORDER — LEVOTHYROXINE SODIUM 75 UG/1
TABLET ORAL
Qty: 90 TABLET | Refills: 0 | Status: SHIPPED | OUTPATIENT
Start: 2023-09-27 | End: 2023-10-10

## 2023-09-27 NOTE — TELEPHONE ENCOUNTER
"Last Written Prescription Date:  6/28/23  Last Fill Quantity: 90,  # refills: 0   Last office visit: 8/2/2022 with prescribing provider:  Dr. Vega   Future Office Visit:  10/10/23        Requested Prescriptions   Pending Prescriptions Disp Refills    levothyroxine (SYNTHROID/LEVOTHROID) 75 MCG tablet [Pharmacy Med Name: LEVOTHYROXINE 75 MCG TABLET] 90 tablet 0     Sig: TAKE 1 TABLET BY MOUTH EVERY DAY       Thyroid Protocol Failed - 9/27/2023 10:00 AM        Failed - Recent (12 mo) or future (30 days) visit within the authorizing provider's specialty     Patient has had an office visit with the authorizing provider or a provider within the authorizing providers department within the previous 12 mos or has a future within next 30 days. See \"Patient Info\" tab in inbasket, or \"Choose Columns\" in Meds & Orders section of the refill encounter.              Failed - Normal TSH on file in past 12 months     Recent Labs   Lab Test 01/19/23  0802   TSH 0.07*              Passed - Patient is 12 years or older        Passed - Medication is active on med list           Refills sent  Clarita Salazar RN    "

## 2023-10-09 DIAGNOSIS — C92.01 AML (ACUTE MYELOID LEUKEMIA) IN REMISSION (H): ICD-10-CM

## 2023-10-09 DIAGNOSIS — Z92.21 STATUS POST CHEMOTHERAPY: ICD-10-CM

## 2023-10-09 DIAGNOSIS — Z85.6 HISTORY OF MYELOID LEUKEMIA: ICD-10-CM

## 2023-10-09 DIAGNOSIS — E78.00 HYPERCHOLESTEREMIA: ICD-10-CM

## 2023-10-09 RX ORDER — ROSUVASTATIN CALCIUM 40 MG/1
40 TABLET, COATED ORAL DAILY
Qty: 90 TABLET | Refills: 0 | Status: SHIPPED | OUTPATIENT
Start: 2023-10-09 | End: 2023-12-28

## 2023-10-09 ASSESSMENT — ENCOUNTER SYMPTOMS
WEAKNESS: 0
TASTE DISTURBANCE: 0
SMELL DISTURBANCE: 0
HOARSE VOICE: 0
TROUBLE SWALLOWING: 0
SINUS CONGESTION: 1
SINUS PAIN: 0
LOSS OF CONSCIOUSNESS: 0
RECTAL PAIN: 0
BOWEL INCONTINENCE: 0
SORE THROAT: 0
MEMORY LOSS: 0
VOMITING: 0
NECK MASS: 0
TINGLING: 0
ABDOMINAL PAIN: 0
JAUNDICE: 0
BLOATING: 0
DIZZINESS: 1
HEADACHES: 1
DISTURBANCES IN COORDINATION: 0
NUMBNESS: 0
CONSTIPATION: 0
NAUSEA: 1
HEARTBURN: 0
SPEECH CHANGE: 0
BLOOD IN STOOL: 0
TREMORS: 0
DIARRHEA: 0
SEIZURES: 0
PARALYSIS: 0

## 2023-10-10 ENCOUNTER — VIRTUAL VISIT (OUTPATIENT)
Dept: ENDOCRINOLOGY | Facility: CLINIC | Age: 43
End: 2023-10-10
Payer: COMMERCIAL

## 2023-10-10 DIAGNOSIS — E23.7 PITUITARY ABNORMALITY (H): ICD-10-CM

## 2023-10-10 DIAGNOSIS — E03.8 CENTRAL HYPOTHYROIDISM: Primary | ICD-10-CM

## 2023-10-10 PROCEDURE — 99214 OFFICE O/P EST MOD 30 MIN: CPT | Mod: VID | Performed by: INTERNAL MEDICINE

## 2023-10-10 RX ORDER — LEVOTHYROXINE SODIUM 75 UG/1
75 TABLET ORAL DAILY
Qty: 90 TABLET | Refills: 4 | Status: SHIPPED | OUTPATIENT
Start: 2023-10-10

## 2023-10-10 NOTE — LETTER
10/10/2023         RE: Alfredo Brewer  [id number] [facility] [id number]        Dear Colleague,    Thank you for referring your patient, Alfredo Brewer, to the [facility] [place]. Please see a copy of my visit note below.      Video-Visit Details    Type of service:  Video Visit  Video Start Time: 3:45  Video End Time: 4:06  Originating Location (pt. Location): [place], [place]  Distant Location (provider location):  Home  Platform used for Video Visit: Delilah Vega MD      Alfredo Brewer is a 43 year old yo male who presents today via billable video visit for follow-up of hypopituitarism OF note, he has past medical history of childhood adult leukemia s/p chemo/XRT in remission since age 3, also vertigo, chronic headaches, HLD. Last seen by me 8/2022.  Chief Complaint   Patient presents with     Follow Up     Central hypothyroidism     INTERVAL HISTORY:  - Increasing doses of levothyroxine up to 88mcg, but noted increase in vertigo/migraines on this dose, so reduced back to 75mcg. 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 But yes in context of vertigo attack  - Noted improvement in cold intolerance   - Needs to be reconnected with neurology    Subjective:    1) Hypopituitarism  HPI in brief: At 6 mo, developed acute leukemia, s/p chemotherapy, brain radiation, and in remission since age 3. Now following with Dr Pimentel oncology for monitoring/maintenance.  Noted on MRI brain to have small range pituitary gland, came for evaluation. Of note, had normal puberty/growth.   Testing revealed low T4 (confirmed by T4 free equilibrium) and low testosterone. Other axes intact.  See note from 5/2022 for full ROS/HPI    2) Central Hypothyroidism  Current regimen- Levothyroxine 75mcg    3) Central hypogonadism  - Low testosterone level, pending trial for conception. Completed testing in 2019 (as below) with male fertility specialist at [facility] with reportedly normal sperm at that time with very similar testosterone levels to now      August 23, 2019 ([facility] testing, [facility])  Prolactin 5.2  Testosterone Total 304 (200-745)  Free Testosterone 9.1 (3.1-12.8)  Bioavailable Testo 214.0 (71.7-300.0)  SHBG 13 (16-70)  LH- 3  Estradiol- 13  FSH- 7.7    5/7/2019  Testosterone 172    6/23/2022  Testosterone 208  Free testosterone 6.42 (nl range)      Active diagnoses this visit:     Central hypothyroidism  Pituitary abnormality (H24)     ROS: 10 point ROS neg other than the symptoms noted above in the HPI.      Medical, surgical, social, and family histories, medications and allergies reviewed and updated.    Objective:  Physical Exam (visual exam)  VS:  no vital signs taken for video visit  CONSTITUTIONAL: healthy, alert and NAD, responding appropriately  ENT: normocephalic, no visual evidence of trauma, normal nose and oral mucosa  EYES: conjunctivae and sclerae normal, no exophthalmos or proptosis  THYROID:  no visualized nodules or goiter  LUNGS: no audible wheeze, cough or visible cyanosis, no visible retractions or increased work of breathing  EXTREMITIES: no hand tremors  NEUROLOGY: cranial nerves grossly intact with no obvious deficit.  SKIN:  no visualized skin lesions or rash, no edema visualized  PSYCH: mentation appears normal, normal judgement            Lab Results   Component Value Date/Time    TSH 0.07 (L) 01/19/2023 08:02 AM    TSH 0.12 (L) 10/04/2022 07:50 AM    TSH 1.93 06/03/2021 07:40 AM    T4 1.23 01/19/2023 08:02 AM    LH 3.2 05/24/2022 07:50 AM    FSH 7.7 05/24/2022 07:50 AM       Last Comprehensive Metabolic Panel:  Sodium   Date Value Ref Range Status    06/03/2021 135 133 - 144 mmol/L Final     Potassium   Date Value Ref Range Status   06/03/2021 4.1 3.4 - 5.3 mmol/L Final     Chloride   Date Value Ref Range Status   06/03/2021 104 94 - 109 mmol/L Final     Carbon Dioxide   Date Value Ref Range Status   06/03/2021 28 20 - 32 mmol/L Final     Anion Gap   Date Value Ref Range Status   06/03/2021 3 3 - 14 mmol/L Final     Glucose   Date Value Ref Range Status   06/03/2021 91 70 - 99 mg/dL Final     Urea Nitrogen   Date Value Ref Range Status   06/03/2021 21 7 - 30 mg/dL Final     Creatinine   Date Value Ref Range Status   06/03/2021 1.12 0.66 - 1.25 mg/dL Final     GFR Estimate   Date Value Ref Range Status   06/03/2021 81 >60 mL/min/[1.73_m2] Final     Comment:     Non  GFR Calc  Starting 12/18/2018, serum creatinine based estimated GFR (eGFR) will be   calculated using the Chronic Kidney Disease Epidemiology Collaboration   (CKD-EPI) equation.       Calcium   Date Value Ref Range Status   06/03/2021 9.1 8.5 - 10.1 mg/dL Final     Bilirubin Total   Date Value Ref Range Status   06/03/2021 0.4 0.2 - 1.3 mg/dL Final     Alkaline Phosphatase   Date Value Ref Range Status   06/03/2021 71 40 - 150 U/L Final     ALT   Date Value Ref Range Status   06/03/2021 34 0 - 70 U/L Final     AST   Date Value Ref Range Status   06/03/2021 23 0 - 45 U/L Final     MRI Brain w/w/o contrast 2/9/2022:  FINDINGS: No abnormal intracranial restricted diffusion identified to  suggest acute infarct. The ventricles are normal in size and  configuration. A few scattered punctate nonspecific foci of T2 FLAIR  hyperintense signal are seen in the cerebral white matter (for  example, see series 4, images 17 and 21), not appearing significantly  changed. Although nonspecific, these can represent early sequela of  chronic small vessel ischemic change, and this extent would be  considered within normal limits for a patient of this age. Unchanged  borderline small size of the pituitary  gland for age with pituitary  height of approximately 3.5 mm (series 7 image 13). Otherwise, the  morphology, volume, and signal intensity of the brain parenchyma are  within normal limits for age. The major arterial flow voids of the  skull base are grossly maintained. No evidence for intracranial  hemorrhage, extra-axial fluid collection, mass lesion, or abnormal  intracranial postcontrast enhancement.     The orbits appear within normal limits, accounting for limitations of  technique. The calvarium, skull base and mid face otherwise appear  grossly unremarkable.                                                                      IMPRESSION:  1. No acute intracranial process. No significant change compared to  brain MRI of 6/21/2019. Specifically, no intracranial hemorrhage, mass  lesion, infarct, or abnormal enhancement identified.  2. A few punctate nonspecific foci of T2 FLAIR hyperintense signal  noted in the cerebral white matter.  3. Borderline small size of the pituitary gland for the patient's age.  This can be a normal variant, but also can be related to pituitary  atrophy or hypoplasia. Given the patient's history of whole brain  radiation, this could represent pituitary atrophy. Recommend  clinical/laboratory correlation for any evidence to suggest  hypothalamic/pituitary endocrine dysfunction.  4. Otherwise unremarkable brain MRI.        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 Recommended treatment with clomiphene vs hcg injections instead of testosterone given his current goals.   - He will notify me if difficulty conceiving or interested in trialing sperm count to see if  needed        Orders Placed This Encounter   Procedures     T4 free     TSH     Cortisol        Return to clinic 12 months    A total of 30 minutes were spent today 10/10/23 on this visit including chart review, history and counseling, documentation and other activities as detailed above.     Answers submitted by the patient for this visit:  Symptoms you have experienced in the last 30 days (Submitted on 10/9/2023)  General Symptoms: No  Skin Symptoms: No  HENT Symptoms: Yes  EYE SYMPTOMS: No  HEART SYMPTOMS: No  LUNG SYMPTOMS: No  INTESTINAL SYMPTOMS: Yes  URINARY SYMPTOMS: No  REPRODUCTIVE SYMPTOMS: No  SKELETAL SYMPTOMS: No  BLOOD SYMPTOMS: No  NERVOUS SYSTEM SYMPTOMS: Yes  MENTAL HEALTH SYMPTOMS: No  Please answer the questions below to tell us what conditions you are experiencing: (Submitted on 10/9/2023)  Ear pain: No  Ear discharge: No  Hearing loss: No  Tinnitus: No  Nosebleeds: No  Congestion: Yes  Sinus pain: No  Trouble swallowing: No   Voice hoarseness: No  Mouth sores: No  Sore throat: No  Tooth pain: No  Gum tenderness: No  Bleeding gums: No  Change in taste: No  Change in sense of smell: No  Dry mouth: No  Hearing aid used: No  Neck lump: No  Please answer the questions below to tell us what conditions you are experiencing: (Submitted on 10/9/2023)  Heart burn or indigestion: No  Nausea: Yes  Vomiting: No  Abdominal pain: No  Bloating: No  Constipation: No  Diarrhea: No  Blood in stool: No  Black stools: No  Rectal or Anal pain: No  Fecal incontinence: No  Yellowing of skin or eyes: No  Vomit with blood: No  Change in stools: No  Please answer the questions below to tell us what condition you are experiencing: (Submitted on 10/9/2023)  Trouble with coordination: No  Dizziness or trouble with balance: Yes  Fainting or black-out spells: No  Memory loss: No  Headache: Yes  Seizures: No  Speech problems: No  Tingling: No  Tremor: No  Weakness: No  Difficulty walking: Yes  Paralysis: No  Numbness:  No      Again, thank you for allowing me to participate in the care of your patient.        Sincerely,        Bettie Vega MD

## 2023-10-10 NOTE — PROGRESS NOTES
Video-Visit Details    Type of service:  Video Visit  Video Start Time: 3:45  Video End Time: 4:06  Originating Location (pt. Location): Home, [place]  Distant Location (provider location):  Home  Platform used for Video Visit: Delilah Vega MD      Alfredo Brewer is a 43 year old yo male who presents today via billable video visit for follow-up of hypopituitarism OF note, he has past medical history of childhood adult leukemia s/p chemo/XRT in remission since age 3, also vertigo, chronic headaches, HLD. Last seen by me 8/2022.  Chief Complaint   Patient presents with    Follow Up     Central hypothyroidism     INTERVAL HISTORY:  - Increasing doses of levothyroxine up to 88mcg, but noted increase in vertigo/migraines on this dose, so reduced back to 75mcg. 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 But yes in context of vertigo attack  - Noted improvement in cold intolerance   - Needs to be reconnected with neurology    Subjective:    1) Hypopituitarism  HPI in brief: At 6 mo, developed acute leukemia, s/p chemotherapy, brain radiation, and in remission since age 3. Now following with Dr Pimentel oncology for monitoring/maintenance.  Noted on MRI brain to have small range pituitary gland, came for evaluation. Of note, had normal puberty/growth.   Testing revealed low T4 (confirmed by T4 free equilibrium) and low testosterone. Other axes intact. See note from 5/2022 for full ROS/HPI    2) Central Hypothyroidism  Current regimen- Levothyroxine 75mcg    3) Central hypogonadism  - Low testosterone level, pending trial for conception. Completed testing in 2019 (as below) with male fertility specialist at  [facility] with reportedly normal sperm at that time with very similar testosterone levels to now      August 23, 2019 ([facility] testing, [facility])  Prolactin 5.2  Testosterone Total 304 (200-745)  Free Testosterone 9.1 (3.1-12.8)  Bioavailable Testo 214.0 (71.7-300.0)  SHBG 13 (16-70)  LH- 3  Estradiol- 13  FSH- 7.7    5/7/2019  Testosterone 172    6/23/2022  Testosterone 208  Free testosterone 6.42 (nl range)      Active diagnoses this visit:     Central hypothyroidism  Pituitary abnormality (H24)     ROS: 10 point ROS neg other than the symptoms noted above in the HPI.      Medical, surgical, social, and family histories, medications and allergies reviewed and updated.    Objective:  Physical Exam (visual exam)  VS:  no vital signs taken for video visit  CONSTITUTIONAL: healthy, alert and NAD, responding appropriately  ENT: normocephalic, no visual evidence of trauma, normal nose and oral mucosa  EYES: conjunctivae and sclerae normal, no exophthalmos or proptosis  THYROID:  no visualized nodules or goiter  LUNGS: no audible wheeze, cough or visible cyanosis, no visible retractions or increased work of breathing  EXTREMITIES: no hand tremors  NEUROLOGY: cranial nerves grossly intact with no obvious deficit.  SKIN:  no visualized skin lesions or rash, no edema visualized  PSYCH: mentation appears normal, normal judgement            Lab Results   Component Value Date/Time    TSH 0.07 (L) 01/19/2023 08:02 AM    TSH 0.12 (L) 10/04/2022 07:50 AM    TSH 1.93 06/03/2021 07:40 AM    T4 1.23 01/19/2023 08:02 AM    LH 3.2 05/24/2022 07:50 AM    FSH 7.7 05/24/2022 07:50 AM       Last Comprehensive Metabolic Panel:  Sodium   Date Value Ref Range Status   06/03/2021 135 133 - 144 mmol/L Final     Potassium   Date Value Ref Range Status   06/03/2021 4.1 3.4 - 5.3 mmol/L Final     Chloride   Date Value Ref Range Status   06/03/2021 104 94 - 109 mmol/L Final     Carbon Dioxide   Date Value Ref Range Status    06/03/2021 28 20 - 32 mmol/L Final     Anion Gap   Date Value Ref Range Status   06/03/2021 3 3 - 14 mmol/L Final     Glucose   Date Value Ref Range Status   06/03/2021 91 70 - 99 mg/dL Final     Urea Nitrogen   Date Value Ref Range Status   06/03/2021 21 7 - 30 mg/dL Final     Creatinine   Date Value Ref Range Status   06/03/2021 1.12 0.66 - 1.25 mg/dL Final     GFR Estimate   Date Value Ref Range Status   06/03/2021 81 >60 mL/min/[1.73_m2] Final     Comment:     Non  GFR Calc  Starting 12/18/2018, serum creatinine based estimated GFR (eGFR) will be   calculated using the Chronic Kidney Disease Epidemiology Collaboration   (CKD-EPI) equation.       Calcium   Date Value Ref Range Status   06/03/2021 9.1 8.5 - 10.1 mg/dL Final     Bilirubin Total   Date Value Ref Range Status   06/03/2021 0.4 0.2 - 1.3 mg/dL Final     Alkaline Phosphatase   Date Value Ref Range Status   06/03/2021 71 40 - 150 U/L Final     ALT   Date Value Ref Range Status   06/03/2021 34 0 - 70 U/L Final     AST   Date Value Ref Range Status   06/03/2021 23 0 - 45 U/L Final     MRI Brain w/w/o contrast 2/9/2022:  FINDINGS: No abnormal intracranial restricted diffusion identified to  suggest acute infarct. The ventricles are normal in size and  configuration. A few scattered punctate nonspecific foci of T2 FLAIR  hyperintense signal are seen in the cerebral white matter (for  example, see series 4, images 17 and 21), not appearing significantly  changed. Although nonspecific, these can represent early sequela of  chronic small vessel ischemic change, and this extent would be  considered within normal limits for a patient of this age. Unchanged  borderline small size of the pituitary gland for age with pituitary  height of approximately 3.5 mm (series 7 image 13). Otherwise, the  morphology, volume, and signal intensity of the brain parenchyma are  within normal limits for age. The major arterial flow voids of the  skull base are  grossly maintained. No evidence for intracranial  hemorrhage, extra-axial fluid collection, mass lesion, or abnormal  intracranial postcontrast enhancement.     The orbits appear within normal limits, accounting for limitations of  technique. The calvarium, skull base and mid face otherwise appear  grossly unremarkable.                                                                      IMPRESSION:  1. No acute intracranial process. No significant change compared to  brain MRI of 6/21/2019. Specifically, no intracranial hemorrhage, mass  lesion, infarct, or abnormal enhancement identified.  2. A few punctate nonspecific foci of T2 FLAIR hyperintense signal  noted in the cerebral white matter.  3. Borderline small size of the pituitary gland for the patient's age.  This can be a normal variant, but also can be related to pituitary  atrophy or hypoplasia. Given the patient's history of whole brain  radiation, this could represent pituitary atrophy. Recommend  clinical/laboratory correlation for any evidence to suggest  hypothalamic/pituitary endocrine dysfunction.  4. Otherwise unremarkable brain MRI.        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 Recommended treatment with clomiphene vs hcg injections instead of testosterone given his current goals.   - He will notify me if difficulty conceiving or interested in trialing sperm count to see if needed        Orders Placed This Encounter   Procedures    T4 free    TSH    Cortisol        Return to clinic 12 months    A total of 30 minutes were spent today 10/10/23 on this visit including chart review, history and counseling, documentation and  other activities as detailed above.     Answers submitted by the patient for this visit:  Symptoms you have experienced in the last 30 days (Submitted on 10/9/2023)  General Symptoms: No  Skin Symptoms: No  HENT Symptoms: Yes  EYE SYMPTOMS: No  HEART SYMPTOMS: No  LUNG SYMPTOMS: No  INTESTINAL SYMPTOMS: Yes  URINARY SYMPTOMS: No  REPRODUCTIVE SYMPTOMS: No  SKELETAL SYMPTOMS: No  BLOOD SYMPTOMS: No  NERVOUS SYSTEM SYMPTOMS: Yes  MENTAL HEALTH SYMPTOMS: No  Please answer the questions below to tell us what conditions you are experiencing: (Submitted on 10/9/2023)  Ear pain: No  Ear discharge: No  Hearing loss: No  Tinnitus: No  Nosebleeds: No  Congestion: Yes  Sinus pain: No  Trouble swallowing: No   Voice hoarseness: No  Mouth sores: No  Sore throat: No  Tooth pain: No  Gum tenderness: No  Bleeding gums: No  Change in taste: No  Change in sense of smell: No  Dry mouth: No  Hearing aid used: No  Neck lump: No  Please answer the questions below to tell us what conditions you are experiencing: (Submitted on 10/9/2023)  Heart burn or indigestion: No  Nausea: Yes  Vomiting: No  Abdominal pain: No  Bloating: No  Constipation: No  Diarrhea: No  Blood in stool: No  Black stools: No  Rectal or Anal pain: No  Fecal incontinence: No  Yellowing of skin or eyes: No  Vomit with blood: No  Change in stools: No  Please answer the questions below to tell us what condition you are experiencing: (Submitted on 10/9/2023)  Trouble with coordination: No  Dizziness or trouble with balance: Yes  Fainting or black-out spells: No  Memory loss: No  Headache: Yes  Seizures: No  Speech problems: No  Tingling: No  Tremor: No  Weakness: No  Difficulty walking: Yes  Paralysis: No  Numbness: No

## 2023-10-10 NOTE — NURSING NOTE
Chief Complaint   Patient presents with    Follow Up     Central hypothyroidism       There were no vitals filed for this visit.    There is no height or weight on file to calculate BMI.    Susie Frye, [facility]

## 2023-12-20 DIAGNOSIS — R00.0 SINUS TACHYCARDIA: ICD-10-CM

## 2023-12-20 RX ORDER — CARVEDILOL 6.25 MG/1
9.38 TABLET ORAL 2 TIMES DAILY WITH MEALS
Qty: 270 TABLET | Refills: 0 | Status: SHIPPED | OUTPATIENT
Start: 2023-12-20 | End: 2024-02-15

## 2023-12-28 ENCOUNTER — TELEPHONE (OUTPATIENT)
Dept: CARDIOLOGY | Facility: CLINIC | Age: 43
End: 2023-12-28
Payer: COMMERCIAL

## 2023-12-28 DIAGNOSIS — Z85.6 HISTORY OF MYELOID LEUKEMIA: ICD-10-CM

## 2023-12-28 DIAGNOSIS — Z92.21 STATUS POST CHEMOTHERAPY: ICD-10-CM

## 2023-12-28 DIAGNOSIS — C92.01 AML (ACUTE MYELOID LEUKEMIA) IN REMISSION (H): ICD-10-CM

## 2023-12-28 DIAGNOSIS — E78.00 HYPERCHOLESTEREMIA: ICD-10-CM

## 2023-12-28 RX ORDER — ROSUVASTATIN CALCIUM 40 MG/1
40 TABLET, COATED ORAL DAILY
Qty: 90 TABLET | Refills: 0 | Status: SHIPPED | OUTPATIENT
Start: 2023-12-28 | End: 2024-02-15

## 2023-12-28 NOTE — TELEPHONE ENCOUNTER
[facility] Call Center    Phone Message    May a detailed message be left on voicemail: yes     Reason for Call: Order(s): Other:   Reason for requested: Patient called to schedule an echo and follow up. Echo order has  and needs to be extended. Please extend the order and call patient back to further coordinate.  Date needed: 23  Provider name: Morejon    Action Taken: Message routed to:  Other: Cardiology    Travel Screening: Not Applicable    Thank you!  [facility]

## 2024-01-07 ENCOUNTER — HEALTH MAINTENANCE LETTER (OUTPATIENT)
Age: 44
End: 2024-01-07

## 2024-01-30 ENCOUNTER — HOSPITAL ENCOUNTER (OUTPATIENT)
Dept: CARDIOLOGY | Facility: CLINIC | Age: 44
Discharge: HOME | End: 2024-01-30
Attending: PHYSICIAN ASSISTANT | Admitting: PHYSICIAN ASSISTANT
Payer: COMMERCIAL

## 2024-01-30 DIAGNOSIS — I42.9 SECONDARY CARDIOMYOPATHY (H): ICD-10-CM

## 2024-01-30 DIAGNOSIS — Z92.21 STATUS POST CHEMOTHERAPY: ICD-10-CM

## 2024-01-30 DIAGNOSIS — Z85.6 HISTORY OF MYELOID LEUKEMIA: ICD-10-CM

## 2024-01-30 LAB — LVEF ECHO: NORMAL

## 2024-01-30 PROCEDURE — 93306 TTE W/DOPPLER COMPLETE: CPT

## 2024-01-30 PROCEDURE — 93306 TTE W/DOPPLER COMPLETE: CPT | Mod: 26 | Performed by: INTERNAL MEDICINE

## 2024-01-30 PROCEDURE — 93356 MYOCRD STRAIN IMG SPCKL TRCK: CPT | Performed by: INTERNAL MEDICINE

## 2024-02-15 ENCOUNTER — OFFICE VISIT (OUTPATIENT)
Dept: CARDIOLOGY | Facility: CLINIC | Age: 44
End: 2024-02-15
Payer: COMMERCIAL

## 2024-02-15 VITALS
DIASTOLIC BLOOD PRESSURE: 88 MMHG | WEIGHT: 180.7 LBS | BODY MASS INDEX: 27.38 KG/M2 | SYSTOLIC BLOOD PRESSURE: 136 MMHG | HEIGHT: 68 IN | HEART RATE: 80 BPM

## 2024-02-15 DIAGNOSIS — I42.9 SECONDARY CARDIOMYOPATHY (H): ICD-10-CM

## 2024-02-15 DIAGNOSIS — Z85.6 HISTORY OF MYELOID LEUKEMIA: ICD-10-CM

## 2024-02-15 DIAGNOSIS — Z92.21 STATUS POST CHEMOTHERAPY: ICD-10-CM

## 2024-02-15 DIAGNOSIS — C92.01 AML (ACUTE MYELOID LEUKEMIA) IN REMISSION (H): ICD-10-CM

## 2024-02-15 DIAGNOSIS — E78.00 HYPERCHOLESTEREMIA: ICD-10-CM

## 2024-02-15 DIAGNOSIS — R00.0 SINUS TACHYCARDIA: ICD-10-CM

## 2024-02-15 PROCEDURE — 99214 OFFICE O/P EST MOD 30 MIN: CPT | Performed by: PHYSICIAN ASSISTANT

## 2024-02-15 RX ORDER — ROSUVASTATIN CALCIUM 40 MG/1
40 TABLET, COATED ORAL DAILY
Qty: 90 TABLET | Refills: 3 | Status: SHIPPED | OUTPATIENT
Start: 2024-02-15

## 2024-02-15 RX ORDER — CARVEDILOL 6.25 MG/1
9.38 TABLET ORAL 2 TIMES DAILY WITH MEALS
Qty: 270 TABLET | Refills: 3 | Status: SHIPPED | OUTPATIENT
Start: 2024-02-15

## 2024-02-15 NOTE — LETTER
2/15/2024    Robin Purcell MD  [address] [place] [address]    RE: Alfredo Brewer       Dear Colleague,     I had the pleasure of seeing Alfredo Brewer in the [facility].      CARDIOLOGY CLINIC PROGRESS NOTE    DOS: 02/15/2024      Alfredo Brewer  : 1980, 44 year old  MRN: [id number]      History:  44 year old male who received high-dose Adriamycin for acute leukemia in .  No radiation to the chest, radiation to the brain was delivered. He states that in his teen years, he had some cardiomyopathy and was put on lisinopril which improved the cardiomyopathy, though has not been on since his 20s.   He has hypercholesterolemia, HTN, hypothyroidism, migraines.    No chest pain.   No edema.   He did not tolerate Coreg 12.5 mg BID.         ROS:  Skin:  not assessed     Eyes:  not assessed    ENT:  not assessed    Respiratory:  Negative    Cardiovascular:    Positive for;dizziness;lightheadedness;fatigue  Gastroenterology: not assessed    Genitourinary:  not assessed    Musculoskeletal:  not assessed    Neurologic:  not assessed    Psychiatric:  not assessed    Heme/Lymph/Imm:  not assessed    Endocrine:  not assessed      PAST MEDICAL HISTORY:  No past medical history on file.    PAST SURGICAL HISTORY:  No past surgical history on file.    SOCIAL HISTORY:  Social History     Socioeconomic History    Marital status:      Spouse name: None    Number of children: None    Years of education: None    Highest education level: None   Tobacco Use    Smoking status: Never    Smokeless tobacco: Never   Substance and Sexual Activity    Alcohol use: Yes     Comment: a few beers a week       FAMILY HISTORY:  History reviewed. No pertinent family history.    MEDS: albuterol (PROAIR HFA/PROVENTIL HFA/VENTOLIN HFA) 108 (90 Base) MCG/ACT inhaler, Inhale 1-2 puffs into the lungs as needed  amitriptyline (ELAVIL) 25 MG tablet, Take 40 mg by mouth at bedtime  FISH OIL-VITAMIN D PO, Take  "by mouth daily  lactobacillus rhamnosus, GG, (CULTURELL) capsule, Take 1 capsule by mouth daily  levothyroxine (SYNTHROID/LEVOTHROID) 75 MCG tablet, Take 1 tablet (75 mcg) by mouth daily  meclizine (ANTIVERT) 25 MG tablet, Take 1 tablet (25 mg) by mouth every 6 hours as needed for dizziness  omeprazole (PRILOSEC) 20 MG DR capsule, 20 mg daily    No current facility-administered medications on file prior to visit.      ALLERGIES:   Allergies   Allergen Reactions    Codeine      Nausea and vomiting    Erythromycin      Stomach pain    Prednisone      Stomach pain    Vicodin [Acetaminophen]      Stomach pain    Wheat Bran GI Disturbance       PHYSICAL EXAM:  Vitals: /88   Pulse 80   Ht 1.715 m (5' 7.5\")   Wt 82 kg (180 lb 11.2 oz)   BMI 27.88 kg/m    Constitutional:  cooperative, alert and oriented, well developed, well nourished, in no acute distress        Skin:  warm and dry to the touch, no apparent skin lesions or masses noted surgical scars well-healed      Head:  normocephalic, no masses or lesions        Eyes:  pupils equal and round;conjunctivae and lids unremarkable;sclera white;no xanthalasma        ENT:  no pallor or cyanosis        Neck:  JVP normal        Respiratory:  normal symmetry;clear to auscultation        Cardiac: regular rhythm;no murmurs, gallops or rubs detected;normal S1 and S2                  GI:  BS normoactive        Vascular:                                        Extremities and Musculoskeletal:  no edema        Neurological:  no gross motor deficits;affect appropriate              LABS/DATA:  I reviewed the following:  Echo 1/30/24:  Interpretation Summary  The left ventricle is normal in structure, function and size.  The visual ejection fraction is 55-60%.  The right ventricle is normal in structure, function and size.  Doppler interrogation does not demonstrate signficant stenosis or insuffieincy  involving cardiac valves     The GLS was -18.4% . within normal; limits and " not signficantly changed since  the last study 11/8/2022      Echo 11/8/22:  Interpretation Summary  The visual ejection fraction is 55-60%.  Global peak LV longitudinal strain is averaged at -20.6%. This is within  reported normal limits (normal <-18%).  The right ventricle is normal in size and function.  No significant valve disease.  The ascending aorta is borderline dilated at 3.8 cms.     There is a small echodensity noted along the distal lateral wall of the LV.  This is not a thrombus (highly unlikely given the location), and most likely  this is an artifact or a inferiorly displaced or accessory papillary muscle.  Images 21 and 24. Recommend to repeat very limited echo with contrast to  evaluate that.      Echo 5/11/22:  Interpretation Summary  The visual ejection fraction is 50-55%. There is borderline global hypokinesia  of the left ventricle.  The right ventricle is normal in size and function.  No hemodynamically significant valvular disease.  There is no pericardial effusion.     No prior studies for comparison. Strain not performed.            ASSESSMENT/PLAN:  H/o mild cardiomyopathy since a teen secondary to high dose Adriamycin   - LVEF 50-55% on echocardiogram 5/2022  - Started Coreg 6.25 mg BID.  HR and BP are better.    - Echo 11/2022 shows LVEF 55-60% with normal strain  - Echo 1/30/24: EF 55-60%, normal GLS  - Continue Coreg 9.375 mg BID. Did not tolerate higher dose  - Continue statin  - Echo in 2 years        Hypertension, see above  - Controlled        Hypercholesterolemia with hypertriglyceridemia   - On Crestor 20 mg, LDL was 91.  Crestor increased to 40 mg, and LDL was 80.  Now 5/2023 LDL is 59 on Crestor 40 mg. Continue Crestor 40 mg   - TGs elevated. He is on fish oil.  We discussed Lovaza and dietary changes. Will continue to follow and consider Lovaza in follow up      Meds refilled    Follow up:  Discussed last visit with Dr. Curtis, and since all has been stable, will could move out  to every other year with echo. Ordered for 18 months, but can be anywhere 18-24 months from now.  He knows to contact us with any concerns         David Morejon PA-C      Thank you for allowing me to participate in the care of your patient.      Sincerely,     David Morejon PA-C     [facility]  cc:   David Morejon PA-C  [address] [place] [address]

## 2024-02-15 NOTE — PATIENT INSTRUCTIONS
The echo looks good.   Continue Coreg 9.375 mg BID.   See Dr. Curtis back with repeat echo in 18-24 months.     Your last lipid panel was May 2023.  Your LDL was controlled, but the triglycerides were elevated. Work on limiting sugars/fats in your diet, and increasing activity.  In follow up, we can consider prescription strength fish oil.     If you have any concerns, please contact us,

## 2024-02-15 NOTE — PROGRESS NOTES
CARDIOLOGY CLINIC PROGRESS NOTE    DOS: 02/15/2024      Alfredo Brewer  : 1980, 44 year old  MRN: [id number]      History:  44 year old male who received high-dose Adriamycin for acute leukemia in .  No radiation to the chest, radiation to the brain was delivered. He states that in his teen years, he had some cardiomyopathy and was put on lisinopril which improved the cardiomyopathy, though has not been on since his 20s.   He has hypercholesterolemia, HTN, hypothyroidism, migraines.    No chest pain.   No edema.   He did not tolerate Coreg 12.5 mg BID.         ROS:  Skin:  not assessed     Eyes:  not assessed    ENT:  not assessed    Respiratory:  Negative    Cardiovascular:    Positive for;dizziness;lightheadedness;fatigue  Gastroenterology: not assessed    Genitourinary:  not assessed    Musculoskeletal:  not assessed    Neurologic:  not assessed    Psychiatric:  not assessed    Heme/Lymph/Imm:  not assessed    Endocrine:  not assessed      PAST MEDICAL HISTORY:  No past medical history on file.    PAST SURGICAL HISTORY:  No past surgical history on file.    SOCIAL HISTORY:  Social History     Socioeconomic History    Marital status:      Spouse name: None    Number of children: None    Years of education: None    Highest education level: None   Tobacco Use    Smoking status: Never    Smokeless tobacco: Never   Substance and Sexual Activity    Alcohol use: Yes     Comment: a few beers a week       FAMILY HISTORY:  History reviewed. No pertinent family history.    MEDS: albuterol (PROAIR HFA/PROVENTIL HFA/VENTOLIN HFA) 108 (90 Base) MCG/ACT inhaler, Inhale 1-2 puffs into the lungs as needed  amitriptyline (ELAVIL) 25 MG tablet, Take 40 mg by mouth at bedtime  FISH OIL-VITAMIN D PO, Take by mouth daily  lactobacillus rhamnosus, GG, (CULTURELL) capsule, Take 1 capsule by mouth daily  levothyroxine (SYNTHROID/LEVOTHROID) 75 MCG tablet, Take 1 tablet (75 mcg) by mouth daily  meclizine (ANTIVERT)  "25 MG tablet, Take 1 tablet (25 mg) by mouth every 6 hours as needed for dizziness  omeprazole (PRILOSEC) 20 MG DR capsule, 20 mg daily    No current facility-administered medications on file prior to visit.      ALLERGIES:   Allergies   Allergen Reactions    Codeine      Nausea and vomiting    Erythromycin      Stomach pain    Prednisone      Stomach pain    Vicodin [Acetaminophen]      Stomach pain    Wheat Bran GI Disturbance       PHYSICAL EXAM:  Vitals: /88   Pulse 80   Ht 1.715 m (5' 7.5\")   Wt 82 kg (180 lb 11.2 oz)   BMI 27.88 kg/m    Constitutional:  cooperative, alert and oriented, well developed, well nourished, in no acute distress        Skin:  warm and dry to the touch, no apparent skin lesions or masses noted surgical scars well-healed      Head:  normocephalic, no masses or lesions        Eyes:  pupils equal and round;conjunctivae and lids unremarkable;sclera white;no xanthalasma        ENT:  no pallor or cyanosis        Neck:  JVP normal        Respiratory:  normal symmetry;clear to auscultation        Cardiac: regular rhythm;no murmurs, gallops or rubs detected;normal S1 and S2                  GI:  BS normoactive        Vascular:                                        Extremities and Musculoskeletal:  no edema        Neurological:  no gross motor deficits;affect appropriate              LABS/DATA:  I reviewed the following:  Echo 1/30/24:  Interpretation Summary  The left ventricle is normal in structure, function and size.  The visual ejection fraction is 55-60%.  The right ventricle is normal in structure, function and size.  Doppler interrogation does not demonstrate signficant stenosis or insuffieincy  involving cardiac valves     The GLS was -18.4% . within normal; limits and not signficantly changed since  the last study 11/8/2022      Echo 11/8/22:  Interpretation Summary  The visual ejection fraction is 55-60%.  Global peak LV longitudinal strain is averaged at -20.6%. This is " within  reported normal limits (normal <-18%).  The right ventricle is normal in size and function.  No significant valve disease.  The ascending aorta is borderline dilated at 3.8 cms.     There is a small echodensity noted along the distal lateral wall of the LV.  This is not a thrombus (highly unlikely given the location), and most likely  this is an artifact or a inferiorly displaced or accessory papillary muscle.  Images 21 and 24. Recommend to repeat very limited echo with contrast to  evaluate that.      Echo 5/11/22:  Interpretation Summary  The visual ejection fraction is 50-55%. There is borderline global hypokinesia  of the left ventricle.  The right ventricle is normal in size and function.  No hemodynamically significant valvular disease.  There is no pericardial effusion.     No prior studies for comparison. Strain not performed.            ASSESSMENT/PLAN:  H/o mild cardiomyopathy since a teen secondary to high dose Adriamycin   - LVEF 50-55% on echocardiogram 5/2022  - Started Coreg 6.25 mg BID.  HR and BP are better.    - Echo 11/2022 shows LVEF 55-60% with normal strain  - Echo 1/30/24: EF 55-60%, normal GLS  - Continue Coreg 9.375 mg BID. Did not tolerate higher dose  - Continue statin  - Echo in 2 years        Hypertension, see above  - Controlled        Hypercholesterolemia with hypertriglyceridemia   - On Crestor 20 mg, LDL was 91.  Crestor increased to 40 mg, and LDL was 80.  Now 5/2023 LDL is 59 on Crestor 40 mg. Continue Crestor 40 mg   - TGs elevated. He is on fish oil.  We discussed Lovaza and dietary changes. Will continue to follow and consider Lovaza in follow up      Meds refilled    Follow up:  Discussed last visit with Dr. Curtis, and since all has been stable, will could move out to every other year with echo. Ordered for 18 months, but can be anywhere 18-24 months from now.  He knows to contact us with any concerns         David Morejon PA-C

## 2024-05-29 ENCOUNTER — TRANSCRIBE ORDERS (OUTPATIENT)
Dept: OTHER | Age: 44
End: 2024-05-29

## 2024-05-29 DIAGNOSIS — C92.00 ACUTE MYELOID LEUKEMIA NOT HAVING ACHIEVED REMISSION (H): Primary | ICD-10-CM

## 2024-06-04 ENCOUNTER — PATIENT OUTREACH (OUTPATIENT)
Dept: ONCOLOGY | Facility: CLINIC | Age: 44
End: 2024-06-04
Payer: COMMERCIAL

## 2024-09-06 ENCOUNTER — ONCOLOGY VISIT (OUTPATIENT)
Dept: ONCOLOGY | Facility: CLINIC | Age: 44
End: 2024-09-06
Attending: PEDIATRICS
Payer: COMMERCIAL

## 2024-09-06 ENCOUNTER — LAB (OUTPATIENT)
Dept: LAB | Facility: CLINIC | Age: 44
End: 2024-09-06
Attending: PEDIATRICS
Payer: COMMERCIAL

## 2024-09-06 VITALS
DIASTOLIC BLOOD PRESSURE: 98 MMHG | RESPIRATION RATE: 18 BRPM | TEMPERATURE: 98.9 F | HEART RATE: 89 BPM | BODY MASS INDEX: 27.76 KG/M2 | OXYGEN SATURATION: 97 % | SYSTOLIC BLOOD PRESSURE: 157 MMHG | WEIGHT: 179.9 LBS

## 2024-09-06 DIAGNOSIS — Z91.89 AT RISK FOR BONE DENSITY LOSS: ICD-10-CM

## 2024-09-06 DIAGNOSIS — Z91.89 AT RISK FOR ALTERATION IN ENDOCRINE FUNCTION: ICD-10-CM

## 2024-09-06 DIAGNOSIS — Z92.21 STATUS POST CHEMOTHERAPY: ICD-10-CM

## 2024-09-06 DIAGNOSIS — Z91.89 AT RISK FOR HEART FAILURE: ICD-10-CM

## 2024-09-06 DIAGNOSIS — Z92.3 HISTORY OF RADIATION EXPOSURE: ICD-10-CM

## 2024-09-06 DIAGNOSIS — Z91.89 AT RISK FOR METABOLIC IMBALANCE: ICD-10-CM

## 2024-09-06 DIAGNOSIS — Z91.89 AT RISK FOR SECONDARY CANCER: ICD-10-CM

## 2024-09-06 DIAGNOSIS — C92.01 AML (ACUTE MYELOID LEUKEMIA) IN REMISSION (H): Primary | ICD-10-CM

## 2024-09-06 DIAGNOSIS — C92.01 AML (ACUTE MYELOID LEUKEMIA) IN REMISSION (H): ICD-10-CM

## 2024-09-06 DIAGNOSIS — Z91.89 AT HIGH RISK FOR NEUROLOGICAL DEFICIT: ICD-10-CM

## 2024-09-06 LAB
ALBUMIN SERPL BCG-MCNC: 4.9 G/DL (ref 3.5–5.2)
ALP SERPL-CCNC: 67 U/L (ref 40–150)
ALT SERPL W P-5'-P-CCNC: 28 U/L (ref 0–70)
ANION GAP SERPL CALCULATED.3IONS-SCNC: 10 MMOL/L (ref 7–15)
AST SERPL W P-5'-P-CCNC: 27 U/L (ref 0–45)
BASOPHILS # BLD AUTO: 0.1 10E3/UL (ref 0–0.2)
BASOPHILS NFR BLD AUTO: 1 %
BILIRUB SERPL-MCNC: 0.4 MG/DL
BUN SERPL-MCNC: 12.8 MG/DL (ref 6–20)
CALCIUM SERPL-MCNC: 9.8 MG/DL (ref 8.8–10.4)
CHLORIDE SERPL-SCNC: 101 MMOL/L (ref 98–107)
CHOLEST SERPL-MCNC: 159 MG/DL
CREAT SERPL-MCNC: 1.06 MG/DL (ref 0.67–1.17)
EGFRCR SERPLBLD CKD-EPI 2021: 89 ML/MIN/1.73M2
EOSINOPHIL # BLD AUTO: 0.5 10E3/UL (ref 0–0.7)
EOSINOPHIL NFR BLD AUTO: 9 %
ERYTHROCYTE [DISTWIDTH] IN BLOOD BY AUTOMATED COUNT: 12.3 % (ref 10–15)
FASTING STATUS PATIENT QL REPORTED: YES
FASTING STATUS PATIENT QL REPORTED: YES
GLUCOSE SERPL-MCNC: 106 MG/DL (ref 70–99)
HBA1C MFR BLD: 5.8 %
HCO3 SERPL-SCNC: 26 MMOL/L (ref 22–29)
HCT VFR BLD AUTO: 39.3 % (ref 40–53)
HDLC SERPL-MCNC: 49 MG/DL
HGB BLD-MCNC: 13.1 G/DL (ref 13.3–17.7)
IMM GRANULOCYTES # BLD: 0 10E3/UL
IMM GRANULOCYTES NFR BLD: 1 %
LDLC SERPL CALC-MCNC: 72 MG/DL
LYMPHOCYTES # BLD AUTO: 1.6 10E3/UL (ref 0.8–5.3)
LYMPHOCYTES NFR BLD AUTO: 28 %
MCH RBC QN AUTO: 29.2 PG (ref 26.5–33)
MCHC RBC AUTO-ENTMCNC: 33.3 G/DL (ref 31.5–36.5)
MCV RBC AUTO: 88 FL (ref 78–100)
MONOCYTES # BLD AUTO: 0.5 10E3/UL (ref 0–1.3)
MONOCYTES NFR BLD AUTO: 9 %
NEUTROPHILS # BLD AUTO: 2.9 10E3/UL (ref 1.6–8.3)
NEUTROPHILS NFR BLD AUTO: 52 %
NONHDLC SERPL-MCNC: 110 MG/DL
NRBC # BLD AUTO: 0 10E3/UL
NRBC BLD AUTO-RTO: 0 /100
PLATELET # BLD AUTO: 246 10E3/UL (ref 150–450)
POTASSIUM SERPL-SCNC: 4.6 MMOL/L (ref 3.4–5.3)
PROT SERPL-MCNC: 8 G/DL (ref 6.4–8.3)
RBC # BLD AUTO: 4.48 10E6/UL (ref 4.4–5.9)
SODIUM SERPL-SCNC: 137 MMOL/L (ref 135–145)
TRIGL SERPL-MCNC: 192 MG/DL
VIT D+METAB SERPL-MCNC: 34 NG/ML (ref 20–50)
WBC # BLD AUTO: 5.6 10E3/UL (ref 4–11)

## 2024-09-06 PROCEDURE — 83036 HEMOGLOBIN GLYCOSYLATED A1C: CPT

## 2024-09-06 PROCEDURE — 36415 COLL VENOUS BLD VENIPUNCTURE: CPT

## 2024-09-06 PROCEDURE — 82374 ASSAY BLOOD CARBON DIOXIDE: CPT

## 2024-09-06 PROCEDURE — 99205 OFFICE O/P NEW HI 60 MIN: CPT | Performed by: PEDIATRICS

## 2024-09-06 PROCEDURE — 80061 LIPID PANEL: CPT

## 2024-09-06 PROCEDURE — 99213 OFFICE O/P EST LOW 20 MIN: CPT | Performed by: PEDIATRICS

## 2024-09-06 PROCEDURE — 85025 COMPLETE CBC W/AUTO DIFF WBC: CPT

## 2024-09-06 PROCEDURE — 82306 VITAMIN D 25 HYDROXY: CPT

## 2024-09-06 PROCEDURE — 99417 PROLNG OP E/M EACH 15 MIN: CPT | Performed by: PEDIATRICS

## 2024-09-06 PROCEDURE — G2211 COMPLEX E/M VISIT ADD ON: HCPCS | Performed by: PEDIATRICS

## 2024-09-06 PROCEDURE — 84295 ASSAY OF SERUM SODIUM: CPT

## 2024-09-06 ASSESSMENT — PAIN SCALES - GENERAL: PAINLEVEL: NO PAIN (0)

## 2024-09-06 NOTE — NURSING NOTE
Chief Complaint   Patient presents with    Blood Draw     Vpt blood draw by lab rN     Venipuncture labs drawn by lab RN    Brittany Torres RN'

## 2024-09-06 NOTE — Clinical Note
9/6/2024      Alfredo Brewer  [address] [facility] [address]      Dear Colleague,    Thank you for referring your patient, Alfredo Brewer, to the [facility]. Please see a copy of my visit note below.    Pediatric Hematology/Oncology Progress Note    Alfredo Brewer is a 44 year old male referred to establish care in our childhood Cancer Survivor Program (cCSP) here with his wife Bianca.    HPI: No unexplained fevers, no unexplained bruising/bleeding, no unexplained extreme fatigue.  No unexplained swelling or SOB/dyspnea/CP. No dramatic change in hearing or vision.  No new or worsening HAs. No new concerning lumps or bumps.  Biggest issues have been combo of migraines and vertigo episodes which happen intermittently (every 2-4 weeks often in the morning when awakening) with no known trigger and usually resolves by laying down on his left side but is out of work for 1-3 days for sxs to resolve. Once has thrown up during episodes but no other associated sxs.    Review of systems: A complete and comprehensive review of systems was performed and was negative other than what is listed above in the HPI.    PMHx: Appendicitis (2006), Herniated cervical disc (C5/C6), Degenerative disc (C6/C7), Chronic pain, Eczema.  SurgHx: Appendectomy (12/11/2006) and Endoscopy with biopsy (12/26/2013)  FamHx: non-contributory   SocialHx: works full time,  and limiting social interactions due to covid concerns.    Current Outpatient Medications   Medication Sig Dispense Refill    albuterol (PROAIR HFA/PROVENTIL HFA/VENTOLIN HFA) 108 (90 Base) MCG/ACT inhaler Inhale 1-2 puffs into the lungs as needed      amitriptyline (ELAVIL) 25 MG tablet Take 40 mg by mouth at bedtime      carvedilol (COREG) 6.25 MG tablet Take 1.5 tablets (9.375 mg) by mouth 2 times daily (with meals) Appointment needed for further refills 270 tablet 3    FISH OIL-VITAMIN D PO Take by mouth daily      lactobacillus rhamnosus,  GG, (CULTURELL) capsule Take 1 capsule by mouth daily      levothyroxine (SYNTHROID/LEVOTHROID) 75 MCG tablet Take 1 tablet (75 mcg) by mouth daily 90 tablet 4    meclizine (ANTIVERT) 25 MG tablet Take 1 tablet (25 mg) by mouth every 6 hours as needed for dizziness 30 tablet 1    omeprazole (PRILOSEC) 20 MG DR capsule 20 mg daily      rosuvastatin (CRESTOR) 40 MG tablet Take 1 tablet (40 mg) by mouth daily 90 tablet 3     No current facility-administered medications for this visit.       Physical Exam:   GENERAL: Healthy, alert and no distress  EYES: Eyes grossly normal to inspection.  No discharge or erythema, or obvious scleral/conjunctival abnormalities.  HENT: Normal cephalic/atraumatic.  External ears, nose and mouth without ulcers or lesions.  No nasal drainage visible.  NECK: No asymmetry, visible masses or scars  RESP: No audible wheeze, cough, or visible cyanosis.  No visible retractions or increased work of breathing.    CV: no peripheral edema  MS: No gross musculoskeletal defects noted.  Normal range of motion.  No visible edema.  SKIN: Visible skin clear. No significant rash, abnormal pigmentation or lesions.  NEURO: Cranial nerves grossly intact.  Mentation and speech appropriate for age.  PSYCH: Mentation appears normal, affect normal/bright, judgement and insight intact, normal speech and appearance well-groomed.    Labs: none    SURVIVOR CARE PLAN  Treatment History  Diagnosis: Acute Myeloid Leukemia (AML)  Date of Diagnosis: 8/28/80  Date Therapy Completed: 3/2/1983  Treatment:  1) Chemo: Doxorubicin (500mg/m2), Cyclophosphamide (4.7g/m2), 5-Azacytidine, Methotrexate, Carmustine (BCNU), 6-Mercaptopurine, 6-Thioguanine, Vincristine and Cytarabine.  2) Radiation: Whole Brain (1800 cGy, initiated on 1980)  3) Surgery: Skin and testicular biopsy (1980), testicular biopsy (3/2/1983)  4) Bone Marrow Transplant: None  5) Immunotherapy: None  Known Late-Effects  1. Urinary Incontinence  (2007)  2. Chronic Pelvic Pain (2007)  3. Migraine Headaches  4. Vertigo  5. Heart Issues - now all better/resolved (left ventricular dysfunction)  6. Hypertension (high blood pressure)  7. Hyperlipidemia (high cholesterol)  8. Basal Cell Carcinoma (head)  9. Pre cancerous skin lesions (multiple on/and around the head/scalp and R upper anterior shoulder)   New Late-Effects  1. None  Surveillance Plan  1. Second Cancers: There was a risk for second cancers of the blood such as AML or MDS due to chemo drugs such as Cyclophosphamide and Doxorubicin.  However, this risk was highest in the first 5-10 years after the exposure thus are no longer a concern.  In addition, close attention will be paid to this during our yearly history and physical for any concerning signs or symptoms (such as unexplained fevers, bleeding or bruising, extreme fatigue or enlarged glands/swollen lymph nodes). There is a risk of second cancers to the skin, soft tissues and bones within the field of prior radiation.  Thus any new skin findings in the field of radiation should be reported to your dermatologist immediately and any new and unexplained lumps or bumps in the field of radiation should be brought to the attention of a medical provider immediately. Close attention to sun protection is also strongly recommended (so please continue your regular follow-up with dermatology yearly). Any sudden or dramatic change in vision, hearing, taste or smell should be reported immediately to a health care provider who may evaluate for a second cancer of the brain.  This includes unexplained or persistently worsening of your headaches as well as any neurological change.  Awareness of the risk of thyroid cancer is also important in case the whole brain radiation scattered and effected your thyroid which is in your neck.    2. Liver, Kidney and Bladder Health: This risk is low but a possibility after 6-mercaptopurine exposure.  This will be evaluated with  baseline labs (hepatic panel) in 2021.  If normal, these lab tests will only be needed in the future on an as needed basis if you develop symptoms or signs of liver dysfunction. Due to the exposure to chemotherapy drugs that can affect the kidney and bladder, it will be necessary to obtain [facility] kidney health lab (BUN/Cr) at entry into the [facility] but thereafter, if normal, needs to be only sent on an as needed basis.  3. Heart Health: As a result of your exposure to a chemotherapy class of drugs called anthracyclines (such as Doxorubicin) it will be necessary to obtain echos every 2 years. These can be done on the same day as routine appointments with us in the [facility]. This was last done in 2013.  If you notice any new, changed or severe chest pain, shortness of breath or unexplained swelling, please notify a medical provider immediately. Encouraged regular physical activity and discussed the importance of maintaining a healthy weight/BMI and avoiding unhealthy lifestyle choices, such as smoking or excessive alcohol consumption.  4. Bone Health: The exposure to chemotherapy as a child (particularly methotrexate) results in a risk for long-term poor bone health such as decreased bone mineral density. This will need to be assessed with a baseline DEXA scan in the future which then can be repeated only on an as needed basis. We will check a yearly Vitamin D level as well.  5. Hormonal Health: As a result of the exposure to brain radiation and chemo such as cycophosphamide, there is a risk for hormonal abnormalities.  These will be screened for during our yearly [facility] visit with a thorough history and physical. In addition, yearly thyroid function tests should be ordered as well.  6. Mental Health: Many childhood cancer survivors are at risk for neurocognitive deficits as a result of the disruption to their early development for their necessary oncology treatments.  If any changes are noted regarding neurocognitive  function (memory issues, information processing, comprehension, etc) or psychological health please let us and your primary care provider know immediately.  7. Dental Health: As a result of the radiation and chemo exposure, there is risk for increased cavities.  Therefore, it is critical to have routine preventative dental care every 6 months.    8. Eye Health: As a result of the radiation to the brain, there is risk for scatter to the eyes leading to eye late-effects such as cataracts and vision changes.  Therefore, it is critical to have eyes examined yearly by an eye specialist who is aware of the radiation history.  9. Metabolic Health: There is increasing evidence in the childhood cancer survivor literature to suggest that survivors are at increased risk for things related to metabolic health such as obesity, high cholesterol, hormonal imbalance, high BP, poor renal function, etc.  Thus, a lipid panel for cholesterol and triglyceride screening should take place every 1-2 years and if any abnormality is found requiring intervention, we would recommend that the intervention occur immediately and aggressively.  10. Vaccine Health: Please confirm with your PCP that your vaccines are up to date, including HPV. You can now receive any vaccine that he/she recommends, there are no restrictions.  11. Hearing Health: As a result of brain radiation that could scatter to the inner ear, there is a risk for further hearing loss.   Therefore, it is critical to have your hearing checked if you notice a change.      Assessment and Plan:  45 yo M w/ h/o AML treated with chemo and whole brain radiation doing well with stable late -effects most notable for worsening migraines and signs/sxs of vertigo   PLAN:  1) CBC as per # 1 above - pending  2) CMP as per #2 above - pending  3) Echo as per #3 above - needed every 2 years, due in Jan 2026  4) Lipid panel (cholesterol) and HgA1c (blood sugar) as per # 9 above - pending  5) TSH and  free T4 as per # 5 above - pending, getting labs ordered by Dr. Vega (endo)  6) Vit D level as per #4 above - pending  7) Neurology referral for updated evaluation and treatment of migraines and input on how frequently he needs brain MRIs  8) PM&R - Cancer Rehab referral with Dr. 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    The longitudinal plan of care for the diagnosis(es)/condition(s) as documented were addressed during this visit. Due to the added complexity in care, I will continue to support Alfredo in the subsequent management and with ongoing continuity of care.    Total time spent on the following services on the date of the encounter:  Preparing to see patient, chart review, review of outside records, Ordering medications, test, procedures, chemotherapy, Referring or communicating with other healthcare professionals, Interpretation of labs, imaging and other tests, Performing a medically appropriate examination , Counseling and educating the patient/family/caregiver , Documenting clinical information in the electronic or other health record , Communicating results to the patient/family/caregiver , Care coordination , and Total time spent: 95 mins      Pediatric Hematology/Oncology Progress Note    Alfredo Brewer is a 44 year old male referred to establish care in our childhood Cancer Survivor Program (cCSP) here with his wife Bianca.    HPI: No unexplained fevers, no unexplained bruising/bleeding, no unexplained extreme fatigue.  No unexplained swelling or SOB/dyspnea/CP. No dramatic change in hearing or vision.  No new or worsening HAs. No new concerning lumps or bumps.  Biggest issues have been combo of migraines and vertigo episodes which happen  intermittently (every 2-4 weeks often in the morning when awakening) with no known trigger and usually resolves by laying down on his left side but is out of work for 1-3 days for sxs to completely resolve. Once has thrown up during episodes but no other associated sxs.    Review of systems: A complete and comprehensive review of systems was performed and was negative other than what is listed above in the HPI.    PMHx: Appendicitis (2006), Herniated cervical disc (C5/C6), Degenerative disc (C6/C7), Chronic pain, Eczema.  SurgHx: Appendectomy (12/11/2006) and Endoscopy with biopsy (12/26/2013)  FamHx: non-contributory   SocialHx: works full time,  and limiting social interactions due to covid concerns.    Current Outpatient Medications   Medication Sig Dispense Refill     albuterol (PROAIR HFA/PROVENTIL HFA/VENTOLIN HFA) 108 (90 Base) MCG/ACT inhaler Inhale 1-2 puffs into the lungs as needed       amitriptyline (ELAVIL) 25 MG tablet Take 40 mg by mouth at bedtime       carvedilol (COREG) 6.25 MG tablet Take 1.5 tablets (9.375 mg) by mouth 2 times daily (with meals) Appointment needed for further refills 270 tablet 3     FISH OIL-VITAMIN D PO Take by mouth daily       lactobacillus rhamnosus, GG, (CULTURELL) capsule Take 1 capsule by mouth daily       levothyroxine (SYNTHROID/LEVOTHROID) 75 MCG tablet Take 1 tablet (75 mcg) by mouth daily 90 tablet 4     meclizine (ANTIVERT) 25 MG tablet Take 1 tablet (25 mg) by mouth every 6 hours as needed for dizziness 30 tablet 1     omeprazole (PRILOSEC) 20 MG DR capsule 20 mg daily       rosuvastatin (CRESTOR) 40 MG tablet Take 1 tablet (40 mg) by mouth daily 90 tablet 3     No current facility-administered medications for this visit.       Physical Exam:   GENERAL: Healthy, alert and no distress  EYES: Eyes grossly normal to inspection.  No discharge or erythema, or obvious scleral/conjunctival abnormalities.  HENT: Normal cephalic/atraumatic.  External ears, nose and  mouth without ulcers or lesions.  No nasal drainage visible.  NECK: No asymmetry, visible masses or scars  RESP: No audible wheeze, cough, or visible cyanosis.  No visible retractions or increased work of breathing.    CV: no peripheral edema  MS: No gross musculoskeletal defects noted.  Normal range of motion.  No visible edema.  SKIN: Visible skin clear. No significant rash, abnormal pigmentation or lesions.  NEURO: Cranial nerves grossly intact.  Mentation and speech appropriate for age.  PSYCH: Mentation appears normal, affect normal/bright, judgement and insight intact, normal speech and appearance well-groomed.    Labs:     Latest Reference Range & Units 09/06/24 11:00   Sodium 135 - 145 mmol/L 137   Potassium 3.4 - 5.3 mmol/L 4.6   Chloride 98 - 107 mmol/L 101   Carbon Dioxide (CO2) 22 - 29 mmol/L 26   Urea Nitrogen 6.0 - 20.0 mg/dL 12.8   Creatinine 0.67 - 1.17 mg/dL 1.06   GFR Estimate >60 mL/min/1.73m2 89   Calcium 8.8 - 10.4 mg/dL 9.8   Anion Gap 7 - 15 mmol/L 10   Albumin 3.5 - 5.2 g/dL 4.9   Protein Total 6.4 - 8.3 g/dL 8.0   Alkaline Phosphatase 40 - 150 U/L 67   ALT 0 - 70 U/L 28   AST 0 - 45 U/L 27   Bilirubin Total <=1.2 mg/dL 0.4   Cholesterol <200 mg/dL 159   Patient Fasting?  Yes  Yes   Glucose 70 - 99 mg/dL 106 (H)   HDL Cholesterol >=40 mg/dL 49   Hemoglobin A1C <5.7 % 5.8 (H)   LDL Cholesterol Calculated <=100 mg/dL 72   Non HDL Cholesterol <130 mg/dL 110   Triglycerides <150 mg/dL 192 (H)   WBC 4.0 - 11.0 10e3/uL 5.6   Hemoglobin 13.3 - 17.7 g/dL 13.1 (L)   Hematocrit 40.0 - 53.0 % 39.3 (L)   Platelet Count 150 - 450 10e3/uL 246   (H): Data is abnormally high  (L): Data is abnormally low    SURVIVOR CARE PLAN  Treatment History  Diagnosis: Acute Myeloid Leukemia (AML)  Date of Diagnosis: 8/28/80  Date Therapy Completed: 3/2/1983  Treatment:  1) Chemo: Doxorubicin (500mg/m2), Cyclophosphamide (4.7g/m2), 5-Azacytidine, Methotrexate, Carmustine (BCNU), 6-Mercaptopurine, 6-Thioguanine,  Vincristine and Cytarabine.  2) Radiation: Whole Brain (1800 cGy, initiated on 1980)  3) Surgery: Skin and testicular biopsy (1980), testicular biopsy (3/2/1983)  4) Bone Marrow Transplant: None  5) Immunotherapy: None  Known Late-Effects  1. Urinary Incontinence (2007)  2. Chronic Pelvic Pain (2007)  3. Migraine Headaches  4. Vertigo  5. Heart Issues - now all better/resolved (left ventricular dysfunction)  6. Hypertension (high blood pressure)  7. Hyperlipidemia (high cholesterol)  8. Basal Cell Carcinoma (head)  9. Pre cancerous skin lesions (multiple on/and around the head/scalp and R upper anterior shoulder)   New Late-Effects  1. None  Surveillance Plan  1. Second Cancers: There was a risk for second cancers of the blood such as AML or MDS due to chemo drugs such as Cyclophosphamide and Doxorubicin.  However, this risk was highest in the first 5-10 years after the exposure thus are no longer a concern.  In addition, close attention will be paid to this during our yearly history and physical for any concerning signs or symptoms (such as unexplained fevers, bleeding or bruising, extreme fatigue or enlarged glands/swollen lymph nodes). There is a risk of second cancers to the skin, soft tissues and bones within the field of prior radiation.  Thus any new skin findings in the field of radiation should be reported to your dermatologist immediately and any new and unexplained lumps or bumps in the field of radiation should be brought to the attention of a medical provider immediately. Close attention to sun protection is also strongly recommended (so please continue your regular follow-up with dermatology yearly). Any sudden or dramatic change in vision, hearing, taste or smell should be reported immediately to a health care provider who may evaluate for a second cancer of the brain.  This includes unexplained or persistently worsening of your headaches as well as any neurological change.  Awareness of  the risk of thyroid cancer is also important in case the whole brain radiation scattered and effected your thyroid which is in your neck.    2. Liver, Kidney and Bladder Health: This risk is low but a possibility after 6-mercaptopurine exposure.  This will be evaluated with baseline labs (hepatic panel) in 2021.  If normal, these lab tests will only be needed in the future on an as needed basis if you develop symptoms or signs of liver dysfunction. Due to the exposure to chemotherapy drugs that can affect the kidney and bladder, it will be necessary to obtain [facility] kidney health lab (BUN/Cr) at entry into the [facility] but thereafter, if normal, needs to be only sent on an as needed basis.  3. Heart Health: As a result of your exposure to a chemotherapy class of drugs called anthracyclines (such as Doxorubicin) it will be necessary to obtain echos every 2 years. These can be done on the same day [facility]. This was last done in 2013.  If you notice any new, changed or severe chest pain, shortness of breath or unexplained swelling, please notify a medical provider immediately. Encouraged regular physical activity and discussed the importance of maintaining a healthy weight/BMI and avoiding unhealthy lifestyle choices, such as smoking or excessive alcohol consumption.  4. Bone Health: The exposure to chemotherapy as a child (particularly methotrexate) results in a risk for long-term poor bone health such as decreased bone mineral density. This will need to be assessed with a baseline DEXA scan in the future which then can be repeated only on an as needed basis. We will check a yearly Vitamin D level as well.  5. Hormonal Health: As a result of the exposure to brain radiation and chemo such as cycophosphamide, there is a risk for hormonal abnormalities.  These will be screened for during our yearly cCSP visit with a thorough history and physical. In addition, yearly thyroid function tests  should be ordered as well.  6. Mental Health: Many childhood cancer survivors are at risk for neurocognitive deficits as a result of the disruption to their early development for their necessary oncology treatments.  If any changes are noted regarding neurocognitive function (memory issues, information processing, comprehension, etc) or psychological health please let us and your primary care provider know immediately.  7. Dental Health: As a result of the radiation and chemo exposure, there is risk for increased cavities.  Therefore, it is critical to have routine preventative dental care every 6 months.    8. Eye Health: As a result of the radiation to the brain, there is risk for scatter to the eyes leading to eye late-effects such as cataracts and vision changes.  Therefore, it is critical to have eyes examined yearly by an eye specialist who is aware of the radiation history.  9. Metabolic Health: There is increasing evidence in the childhood cancer survivor literature to suggest that survivors are at increased risk for things related to metabolic health such as obesity, high cholesterol, hormonal imbalance, high BP, poor renal function, etc.  Thus, a lipid panel for cholesterol and triglyceride screening should take place every 1-2 years and if any abnormality is found requiring intervention, we would recommend that the intervention occur immediately and aggressively.  10. Vaccine Health: Please confirm with your PCP that your vaccines are up to date, including HPV. You can now receive any vaccine that he/she recommends, there are no restrictions.  11. Hearing Health: As a result of brain radiation that could scatter to the inner ear, there is a risk for further hearing loss.   Therefore, it is critical to have your hearing checked if you notice a change.      Assessment and Plan:  43 yo M w/ h/o AML treated with chemo and whole brain radiation doing well with stable late -effects most notable for worsening  migraines and signs/sxs of vertigo   PLAN:  1) CBC as per # 1 above - looks good, I have no concerns - your hemoglobin is fine, that is not low enough to have any concerns.  2) CMP as per #2 above - looks good, no concerns.  3) Echo as per #3 above - needed every 2 years, due in Jan 2026  4) Lipid panel (cholesterol) and HgA1c (blood sugar) as per # 9 above - looks good, no serious or immediate concerns.  5) TSH and free T4 as per # 5 above - ordered by Dr. Vega (endo), when you see her next, she will follow up.  6) Vit D level as per #4 above - pending. Please supplement as we discussed today.  7) Neurology referral for updated evaluation and treatment of migraines/vertigo and input on how frequently you need brain MRIs  8) return to see me in one year and in the meantime, as your test results come back in I'll send you [employer] messages with the results, what they mean and next steps.    The longitudinal plan of care for the diagnosis(es)/condition(s) as documented were addressed during this visit. Due to the added complexity in care, I will continue to support Alfredo in the subsequent management and with ongoing continuity of care.    Total time spent on the following services on the date of the encounter:  Preparing to see patient, chart review, review of outside records, Ordering medications, test, procedures, chemotherapy, Referring or communicating with other healthcare professionals, Interpretation of labs, imaging and other tests, Performing a medically appropriate examination , Counseling and educating the patient/family/caregiver , Documenting clinical information in the electronic or other health record , Communicating results to the patient/family/caregiver , Care coordination , and Total time spent: 95 mins        Again, thank you for allowing me to participate in the care of your patient.        Sincerely,        Arti Pimentel MD

## 2024-09-06 NOTE — NURSING NOTE
"Oncology Rooming Note    September 6, 2024 9:10 AM   Alfredo Brewer is a 44 year old male who presents for:    Chief Complaint   Patient presents with    Oncology Clinic Visit     RTN for AML      Initial Vitals: BP (!) 157/98 (BP Location: Right arm, Patient Position: Right side, Cuff Size: Adult Large)   Pulse 89   Temp 98.9  F (37.2  C) (Oral)   Resp 18   Wt 81.6 kg (179 lb 14.4 oz)   SpO2 97%   BMI 27.76 kg/m   Estimated body mass index is 27.76 kg/m  as calculated from the following:    Height as of 2/15/24: 1.715 m (5' 7.5\").    Weight as of this encounter: 81.6 kg (179 lb 14.4 oz). Body surface area is 1.97 meters squared.  No Pain (0) Comment: Data Unavailable   No LMP for male patient.  Allergies reviewed: Yes  Medications reviewed: Yes    Medications: Medication refills not needed today.  Pharmacy name entered into [employer]: [id number] - [place], [place] - [address]    Frailty Screening:   Is the patient here for a new oncology consult visit in cancer care? 2. No      Clinical concerns: none       Regina Caldera MA             "

## 2024-09-06 NOTE — LETTER
9/6/2024      RE: Alfredo Brewer  [id number] [address] [id number]     SURVIVOR CARE PLAN  Treatment History  Diagnosis: Acute Myeloid Leukemia (AML)  Date of Diagnosis: 8/28/80  Date Therapy Completed: 3/2/1983  Treatment:  1) Chemo: Doxorubicin (500mg/m2), Cyclophosphamide (4.7g/m2), 5-Azacytidine, Methotrexate, Carmustine (BCNU), 6-Mercaptopurine, 6-Thioguanine, Vincristine and Cytarabine.  2) Radiation: Whole Brain (1800 cGy, initiated on 1980)  3) Surgery: Skin and testicular biopsy (1980), testicular biopsy (3/2/1983)  4) Bone Marrow Transplant: None  5) Immunotherapy: None  Known Late-Effects  1. Urinary Incontinence (2007)  2. Chronic Pelvic Pain (2007)  3. Migraine Headaches  4. Vertigo  5. Heart Issues - now all better/resolved (left ventricular dysfunction)  6. Hypertension (high blood pressure)  7. Hyperlipidemia (high cholesterol)  8. Basal Cell Carcinoma (head)  9. Pre cancerous skin lesions (multiple on/and around the head/scalp and R upper anterior shoulder)   New Late-Effects  1. None  Surveillance Plan  1. Second Cancers: There was a risk for second cancers of the blood such as AML or MDS due to chemo drugs such as Cyclophosphamide and Doxorubicin.  However, this risk was highest in the first 5-10 years after the exposure thus are no longer a concern.  In addition, close attention will be paid to this during our yearly history and physical for any concerning signs or symptoms (such as unexplained fevers, bleeding or bruising, extreme fatigue or enlarged glands/swollen lymph nodes). There is a risk of second cancers to the skin, soft tissues and bones within the field of prior radiation.  Thus any new skin findings in the field of radiation should be reported to your dermatologist immediately and any new and unexplained lumps or bumps in the field of radiation should be brought to the attention of a medical provider immediately. Close attention to sun protection is also strongly  recommended (so please continue your regular follow-up with dermatology yearly). Any sudden or dramatic change in vision, hearing, taste or smell should be reported immediately to a health care provider who may evaluate for a second cancer of the brain.  This includes unexplained or persistently worsening of your headaches as well as any neurological change.  Awareness of the risk of thyroid cancer is also important in case the whole brain radiation scattered and effected your thyroid which is in your neck.    2. Liver, Kidney and Bladder Health: This risk is low but a possibility after 6-mercaptopurine exposure.  This will be evaluated with baseline labs (hepatic panel) in 2021.  If normal, these lab tests will only be needed in the future on an as needed basis if you develop symptoms or signs of liver dysfunction. Due to the exposure to chemotherapy drugs that can affect the kidney and bladder, it will be necessary to obtain [facility] kidney health lab (BUN/Cr) at entry into the [facility] but thereafter, if normal, needs to be only sent on an as needed basis.  3. Heart Health: As a result of your exposure to a chemotherapy class of drugs called anthracyclines (such as Doxorubicin) it will be necessary to obtain echos every 2 years. These can be done on the same day as routine appointments with us in the [facility]. This was last done in 2013.  If you notice any new, changed or severe chest pain, shortness of breath or unexplained swelling, please notify a medical provider immediately. Encouraged regular physical activity and discussed the importance of maintaining a healthy weight/BMI and avoiding unhealthy lifestyle choices, such as smoking or excessive alcohol consumption.  4. Bone Health: The exposure to chemotherapy as a child (particularly methotrexate) results in a risk for long-term poor bone health such as decreased bone mineral density. This will need to be assessed with a baseline DEXA scan in the future which then  can be repeated only on an as needed basis. We will check a yearly Vitamin D level as well.  5. Hormonal Health: As a result of the exposure to brain radiation and chemo such as cycophosphamide, there is a risk for hormonal abnormalities.  These will be screened for during our yearly cCSP visit with a thorough history and physical. In addition, yearly thyroid function tests should be ordered as well.  6. Mental Health: Many childhood cancer survivors are at risk for neurocognitive deficits as a result of the disruption to their early development for their necessary oncology treatments.  If any changes are noted regarding neurocognitive function (memory issues, information processing, comprehension, etc) or psychological health please let us and your primary care provider know immediately.  7. Dental Health: As a result of the radiation and chemo exposure, there is risk for increased cavities.  Therefore, it is critical to have routine preventative dental care every 6 months.    8. Eye Health: As a result of the radiation to the brain, there is risk for scatter to the eyes leading to eye late-effects such as cataracts and vision changes.  Therefore, it is critical to have eyes examined yearly by an eye specialist who is aware of the radiation history.  9. Metabolic Health: There is increasing evidence in the childhood cancer survivor literature to suggest that survivors are at increased risk for things related to metabolic health such as obesity, high cholesterol, hormonal imbalance, high BP, poor renal function, etc.  Thus, a lipid panel for cholesterol and triglyceride screening should take place every 1-2 years and if any abnormality is found requiring intervention, we would recommend that the intervention occur immediately and aggressively.  10. Vaccine Health: Please confirm with your PCP that your vaccines are up to date, including HPV. You can now receive any vaccine that he/she recommends, there are no  restrictions.  11. Hearing Health: As a result of brain radiation that could scatter to the inner ear, there is a risk for further hearing loss.   Therefore, it is critical to have your hearing checked if you notice a change.      PLAN:  1) CBC as per # 1 above - looks good, I have no concerns - your hemoglobin is fine, that is not low enough to have any concerns.  2) CMP as per #2 above - looks good, no concerns.  3) Echo as per #3 above - needed every 2 years, due in Jan 2026  4) Lipid panel (cholesterol) and HgA1c (blood sugar) as per # 9 above - looks good, no serious or immediate concerns.  5) TSH and free T4 as per # 5 above - ordered by Dr. Vega (endo), when you see her next, she will follow up.  6) Vit D level as per #4 above - pending. Please supplement as we discussed today.  7) Neurology referral for updated evaluation and treatment of migraines/vertigo and input on how frequently you need brain MRIs  8) return to see me in one year and in the meantime, as your test results come back in I'll send you [employer] messages with the results, what they mean and next steps.      LABS:   Latest Reference Range & Units 09/06/24 11:00   Sodium 135 - 145 mmol/L 137   Potassium 3.4 - 5.3 mmol/L 4.6   Chloride 98 - 107 mmol/L 101   Carbon Dioxide (CO2) 22 - 29 mmol/L 26   Urea Nitrogen 6.0 - 20.0 mg/dL 12.8   Creatinine 0.67 - 1.17 mg/dL 1.06   GFR Estimate >60 mL/min/1.73m2 89   Calcium 8.8 - 10.4 mg/dL 9.8   Anion Gap 7 - 15 mmol/L 10   Albumin 3.5 - 5.2 g/dL 4.9   Protein Total 6.4 - 8.3 g/dL 8.0   Alkaline Phosphatase 40 - 150 U/L 67   ALT 0 - 70 U/L 28   AST 0 - 45 U/L 27   Bilirubin Total <=1.2 mg/dL 0.4   Cholesterol <200 mg/dL 159   Patient Fasting?  Yes  Yes   Glucose 70 - 99 mg/dL 106 (H)   HDL Cholesterol >=40 mg/dL 49   Hemoglobin A1C <5.7 % 5.8 (H)   LDL Cholesterol Calculated <=100 mg/dL 72   Non HDL Cholesterol <130 mg/dL 110   Triglycerides <150 mg/dL 192 (H)   WBC 4.0 - 11.0 10e3/uL 5.6   Hemoglobin  13.3 - 17.7 g/dL 13.1 (L)   Hematocrit 40.0 - 53.0 % 39.3 (L)   Platelet Count 150 - 450 10e3/uL 246   (H): Data is abnormally high  (L): Data is abnormally low

## 2024-09-06 NOTE — LETTER
9/6/2024      RE: Alfredo Brewer  [address] [address] [facility] [address]     Dear Colleague,    Below is a copy of my most recent visit note with Alfredo in our childhood Cancer Survivor Program (cCSP).  His Survivor Care Plan (SCP) is included below. Please let us know if there is anything that we can do to help and thank you for collaborating with us in his survivorship care.    Arti Pimentel MD, MPH, MSE  Director, Cancer Survivor Program  Pediatric Hematology/Oncology  [facility]    Childhood Cancer Survivor Program (cCSP) Progress Note  Pediatric Oncology       Pediatric Hematology/Oncology Progress Note    Alfredo Brewer is a 44 year old male referred to establish care in our childhood Cancer Survivor Program (cCSP) here with his wife Bianca.    HPI: No unexplained fevers, no unexplained bruising/bleeding, no unexplained extreme fatigue.  No unexplained swelling or SOB/dyspnea/CP. No dramatic change in hearing or vision.  No new or worsening HAs. No new concerning lumps or bumps.  Biggest issues have been combo of migraines and vertigo episodes which happen intermittently (every 2-4 weeks often in the morning when awakening) with no known trigger and usually resolves by laying down on his left side but is out of work for 1-3 days for sxs to completely resolve. Once has thrown up during episodes but no other associated sxs.    Review of systems: A complete and comprehensive review of systems was performed and was negative other than what is listed above in the HPI.    PMHx: Appendicitis (2006), Herniated cervical disc (C5/C6), Degenerative disc (C6/C7), Chronic pain, Eczema.  SurgHx: Appendectomy (12/11/2006) and Endoscopy with biopsy (12/26/2013)  FamHx: non-contributory   SocialHx: works full time,  and limiting social interactions due to covid concerns.    Current Outpatient Medications   Medication Sig Dispense Refill     albuterol (PROAIR HFA/PROVENTIL  HFA/VENTOLIN HFA) 108 (90 Base) MCG/ACT inhaler Inhale 1-2 puffs into the lungs as needed       amitriptyline (ELAVIL) 25 MG tablet Take 40 mg by mouth at bedtime       carvedilol (COREG) 6.25 MG tablet Take 1.5 tablets (9.375 mg) by mouth 2 times daily (with meals) Appointment needed for further refills 270 tablet 3     FISH OIL-VITAMIN D PO Take by mouth daily       lactobacillus rhamnosus, GG, (CULTURELL) capsule Take 1 capsule by mouth daily       levothyroxine (SYNTHROID/LEVOTHROID) 75 MCG tablet Take 1 tablet (75 mcg) by mouth daily 90 tablet 4     meclizine (ANTIVERT) 25 MG tablet Take 1 tablet (25 mg) by mouth every 6 hours as needed for dizziness 30 tablet 1     omeprazole (PRILOSEC) 20 MG DR capsule 20 mg daily       rosuvastatin (CRESTOR) 40 MG tablet Take 1 tablet (40 mg) by mouth daily 90 tablet 3     No current facility-administered medications for this visit.       Physical Exam:   GENERAL: Healthy, alert and no distress  EYES: Eyes grossly normal to inspection.  No discharge or erythema, or obvious scleral/conjunctival abnormalities.  HENT: Normal cephalic/atraumatic.  External ears, nose and mouth without ulcers or lesions.  No nasal drainage visible.  NECK: No asymmetry, visible masses or scars  RESP: No audible wheeze, cough, or visible cyanosis.  No visible retractions or increased work of breathing.    CV: no peripheral edema  MS: No gross musculoskeletal defects noted.  Normal range of motion.  No visible edema.  SKIN: Visible skin clear. No significant rash, abnormal pigmentation or lesions.  NEURO: Cranial nerves grossly intact.  Mentation and speech appropriate for age.  PSYCH: Mentation appears normal, affect normal/bright, judgement and insight intact, normal speech and appearance well-groomed.    Labs:     Latest Reference Range & Units 09/06/24 11:00   Sodium 135 - 145 mmol/L 137   Potassium 3.4 - 5.3 mmol/L 4.6   Chloride 98 - 107 mmol/L 101   Carbon Dioxide (CO2) 22 - 29 mmol/L 26    Urea Nitrogen 6.0 - 20.0 mg/dL 12.8   Creatinine 0.67 - 1.17 mg/dL 1.06   GFR Estimate >60 mL/min/1.73m2 89   Calcium 8.8 - 10.4 mg/dL 9.8   Anion Gap 7 - 15 mmol/L 10   Albumin 3.5 - 5.2 g/dL 4.9   Protein Total 6.4 - 8.3 g/dL 8.0   Alkaline Phosphatase 40 - 150 U/L 67   ALT 0 - 70 U/L 28   AST 0 - 45 U/L 27   Bilirubin Total <=1.2 mg/dL 0.4   Cholesterol <200 mg/dL 159   Patient Fasting?  Yes  Yes   Glucose 70 - 99 mg/dL 106 (H)   HDL Cholesterol >=40 mg/dL 49   Hemoglobin A1C <5.7 % 5.8 (H)   LDL Cholesterol Calculated <=100 mg/dL 72   Non HDL Cholesterol <130 mg/dL 110   Triglycerides <150 mg/dL 192 (H)   WBC 4.0 - 11.0 10e3/uL 5.6   Hemoglobin 13.3 - 17.7 g/dL 13.1 (L)   Hematocrit 40.0 - 53.0 % 39.3 (L)   Platelet Count 150 - 450 10e3/uL 246   (H): Data is abnormally high  (L): Data is abnormally low    SURVIVOR CARE PLAN  Treatment History  Diagnosis: Acute Myeloid Leukemia (AML)  Date of Diagnosis: 8/28/80  Date Therapy Completed: 3/2/1983  Treatment:  1) Chemo: Doxorubicin (500mg/m2), Cyclophosphamide (4.7g/m2), 5-Azacytidine, Methotrexate, Carmustine (BCNU), 6-Mercaptopurine, 6-Thioguanine, Vincristine and Cytarabine.  2) Radiation: Whole Brain (1800 cGy, initiated on 1980)  3) Surgery: Skin and testicular biopsy (1980), testicular biopsy (3/2/1983)  4) Bone Marrow Transplant: None  5) Immunotherapy: None  Known Late-Effects  1. Urinary Incontinence (2007)  2. Chronic Pelvic Pain (2007)  3. Migraine Headaches  4. Vertigo  5. Heart Issues - now all better/resolved (left ventricular dysfunction)  6. Hypertension (high blood pressure)  7. Hyperlipidemia (high cholesterol)  8. Basal Cell Carcinoma (head)  9. Pre cancerous skin lesions (multiple on/and around the head/scalp and R upper anterior shoulder)   New Late-Effects  1. None  Surveillance Plan  1. Second Cancers: There was a risk for second cancers of the blood such as AML or MDS due to chemo drugs such as Cyclophosphamide and Doxorubicin.   However, this risk was highest in the first 5-10 years after the exposure thus are no longer a concern.  In addition, close attention will be paid to this during our yearly history and physical for any concerning signs or symptoms (such as unexplained fevers, bleeding or bruising, extreme fatigue or enlarged glands/swollen lymph nodes). There is a risk of second cancers to the skin, soft tissues and bones within the field of prior radiation.  Thus any new skin findings in the field of radiation should be reported to your dermatologist immediately and any new and unexplained lumps or bumps in the field of radiation should be brought to the attention of a medical provider immediately. Close attention to sun protection is also strongly recommended (so please continue your regular follow-up with dermatology yearly). Any sudden or dramatic change in vision, hearing, taste or smell should be reported immediately to a health care provider who may evaluate for a second cancer of the brain.  This includes unexplained or persistently worsening of your headaches as well as any neurological change.  Awareness of the risk of thyroid cancer is also important in case the whole brain radiation scattered and effected your thyroid which is in your neck.    2. Liver, Kidney and Bladder Health: This risk is low but a possibility after 6-mercaptopurine exposure.  This will be evaluated with baseline labs (hepatic panel) in 2021.  If normal, these lab tests will only be needed in the future on an as needed basis if you develop symptoms or signs of liver dysfunction. Due to the exposure to chemotherapy drugs that can affect the kidney and bladder, it will be necessary to obtain [facility] kidney health lab (BUN/Cr) at entry into the [facility] but thereafter, if normal, needs to be only sent on an as needed basis.  3. Heart Health: As a result of your exposure to a chemotherapy class of drugs called anthracyclines (such as Doxorubicin) it will be  necessary to obtain echos every 2 years. These can be done on the same day as routine appointments with us in the cCSP. This was last done in 2013.  If you notice any new, changed or severe chest pain, shortness of breath or unexplained swelling, please notify a medical provider immediately. Encouraged regular physical activity and discussed the importance of maintaining a healthy weight/BMI and avoiding unhealthy lifestyle choices, such as smoking or excessive alcohol consumption.  4. Bone Health: The exposure to chemotherapy as a child (particularly methotrexate) results in a risk for long-term poor bone health such as decreased bone mineral density. This will need to be assessed with a baseline DEXA scan in the future which then can be repeated only on an as needed basis. We will check a yearly Vitamin D level as well.  5. Hormonal Health: As a result of the exposure to brain radiation and chemo such as cycophosphamide, there is a risk for hormonal abnormalities.  These will be screened for during our yearly cCSP visit with a thorough history and physical. In addition, yearly thyroid function tests should be ordered as well.  6. Mental Health: Many childhood cancer survivors are at risk for neurocognitive deficits as a result of the disruption to their early development for their necessary oncology treatments.  If any changes are noted regarding neurocognitive function (memory issues, information processing, comprehension, etc) or psychological health please let us and your primary care provider know immediately.  7. Dental Health: As a result of the radiation and chemo exposure, there is risk for increased cavities.  Therefore, it is critical to have routine preventative dental care every 6 months.    8. Eye Health: As a result of the radiation to the brain, there is risk for scatter to the eyes leading to eye late-effects such as cataracts and vision changes.  Therefore, it is critical to have eyes examined  yearly by an eye specialist who is aware of the radiation history.  9. Metabolic Health: There is increasing evidence in the childhood cancer survivor literature to suggest that survivors are at increased risk for things related to metabolic health such as obesity, high cholesterol, hormonal imbalance, high BP, poor renal function, etc.  Thus, a lipid panel for cholesterol and triglyceride screening should take place every 1-2 years and if any abnormality is found requiring intervention, we would recommend that the intervention occur immediately and aggressively.  10. Vaccine Health: Please confirm with your PCP that your vaccines are up to date, including HPV. You can now receive any vaccine that he/she recommends, there are no restrictions.  11. Hearing Health: As a result of brain radiation that could scatter to the inner ear, there is a risk for further hearing loss.   Therefore, it is critical to have your hearing checked if you notice a change.      Assessment and Plan:  45 yo M w/ h/o AML treated with chemo and whole brain radiation doing well with stable late -effects most notable for worsening migraines and signs/sxs of vertigo   PLAN:  1) CBC as per # 1 above - looks good, I have no concerns - your hemoglobin is fine, that is not low enough to have any concerns.  2) CMP as per #2 above - looks good, no concerns.  3) Echo as per #3 above - needed every 2 years, due in Jan 2026  4) Lipid panel (cholesterol) and HgA1c (blood sugar) as per # 9 above - looks good, no serious or immediate concerns.  5) TSH and free T4 as per # 5 above - ordered by Dr. Vega (endo), when you see her next, she will follow up.  6) Vit D level as per #4 above - pending. Please supplement as we discussed today.  7) Neurology referral for updated evaluation and treatment of migraines/vertigo and input on how frequently you need brain MRIs  8) return to see me in one year and in the meantime, as your test results come back in I'll  send you MyChart messages with the results, what they mean and next steps.    The longitudinal plan of care for the diagnosis(es)/condition(s) as documented were addressed during this visit. Due to the added complexity in care, I will continue to support Alfredo in the subsequent management and with ongoing continuity of care.    Total time spent on the following services on the date of the encounter:  Preparing to see patient, chart review, review of outside records, Ordering medications, test, procedures, chemotherapy, Referring or communicating with other healthcare professionals, Interpretation of labs, imaging and other tests, Performing a medically appropriate examination , Counseling and educating the patient/family/caregiver , Documenting clinical information in the electronic or other health record , Communicating results to the patient/family/caregiver , Care coordination , and Total time spent: 95 mins    Arti Pimentel MD

## 2024-09-06 NOTE — PROGRESS NOTES
Pediatric Hematology/Oncology Progress Note    Alfredo Brewer is a 44 year old male referred to establish care in our childhood Cancer Survivor Program (cCSP) here with his wife Bianca.    HPI: No unexplained fevers, no unexplained bruising/bleeding, no unexplained extreme fatigue.  No unexplained swelling or SOB/dyspnea/CP. No dramatic change in hearing or vision.  No new or worsening HAs. No new concerning lumps or bumps.  Biggest issues have been combo of migraines and vertigo episodes which happen intermittently (every 2-4 weeks often in the morning when awakening) with no known trigger and usually resolves by laying down on his left side but is out of work for 1-3 days for sxs to completely resolve. Once has thrown up during episodes but no other associated sxs.    Review of systems: A complete and comprehensive review of systems was performed and was negative other than what is listed above in the HPI.    PMHx: Appendicitis (2006), Herniated cervical disc (C5/C6), Degenerative disc (C6/C7), Chronic pain, Eczema.  SurgHx: Appendectomy (12/11/2006) and Endoscopy with biopsy (12/26/2013)  FamHx: non-contributory   SocialHx: works full time,  and limiting social interactions due to covid concerns.    Current Outpatient Medications   Medication Sig Dispense Refill    albuterol (PROAIR HFA/PROVENTIL HFA/VENTOLIN HFA) 108 (90 Base) MCG/ACT inhaler Inhale 1-2 puffs into the lungs as needed      amitriptyline (ELAVIL) 25 MG tablet Take 40 mg by mouth at bedtime      carvedilol (COREG) 6.25 MG tablet Take 1.5 tablets (9.375 mg) by mouth 2 times daily (with meals) Appointment needed for further refills 270 tablet 3    FISH OIL-VITAMIN D PO Take by mouth daily      lactobacillus rhamnosus, GG, (CULTURELL) capsule Take 1 capsule by mouth daily      levothyroxine (SYNTHROID/LEVOTHROID) 75 MCG tablet Take 1 tablet (75 mcg) by mouth daily 90 tablet 4    meclizine (ANTIVERT) 25 MG tablet Take 1 tablet (25 mg) by mouth  every 6 hours as needed for dizziness 30 tablet 1    omeprazole (PRILOSEC) 20 MG DR capsule 20 mg daily      rosuvastatin (CRESTOR) 40 MG tablet Take 1 tablet (40 mg) by mouth daily 90 tablet 3     No current facility-administered medications for this visit.       Physical Exam:   GENERAL: Healthy, alert and no distress  EYES: Eyes grossly normal to inspection.  No discharge or erythema, or obvious scleral/conjunctival abnormalities.  HENT: Normal cephalic/atraumatic.  External ears, nose and mouth without ulcers or lesions.  No nasal drainage visible.  NECK: No asymmetry, visible masses or scars  RESP: No audible wheeze, cough, or visible cyanosis.  No visible retractions or increased work of breathing.    CV: no peripheral edema  MS: No gross musculoskeletal defects noted.  Normal range of motion.  No visible edema.  SKIN: Visible skin clear. No significant rash, abnormal pigmentation or lesions.  NEURO: Cranial nerves grossly intact.  Mentation and speech appropriate for age.  PSYCH: Mentation appears normal, affect normal/bright, judgement and insight intact, normal speech and appearance well-groomed.    Labs:     Latest Reference Range & Units 09/06/24 11:00   Sodium 135 - 145 mmol/L 137   Potassium 3.4 - 5.3 mmol/L 4.6   Chloride 98 - 107 mmol/L 101   Carbon Dioxide (CO2) 22 - 29 mmol/L 26   Urea Nitrogen 6.0 - 20.0 mg/dL 12.8   Creatinine 0.67 - 1.17 mg/dL 1.06   GFR Estimate >60 mL/min/1.73m2 89   Calcium 8.8 - 10.4 mg/dL 9.8   Anion Gap 7 - 15 mmol/L 10   Albumin 3.5 - 5.2 g/dL 4.9   Protein Total 6.4 - 8.3 g/dL 8.0   Alkaline Phosphatase 40 - 150 U/L 67   ALT 0 - 70 U/L 28   AST 0 - 45 U/L 27   Bilirubin Total <=1.2 mg/dL 0.4   Cholesterol <200 mg/dL 159   Patient Fasting?  Yes  Yes   Glucose 70 - 99 mg/dL 106 (H)   HDL Cholesterol >=40 mg/dL 49   Hemoglobin A1C <5.7 % 5.8 (H)   LDL Cholesterol Calculated <=100 mg/dL 72   Non HDL Cholesterol <130 mg/dL 110   Triglycerides <150 mg/dL 192 (H)   WBC 4.0 -  11.0 10e3/uL 5.6   Hemoglobin 13.3 - 17.7 g/dL 13.1 (L)   Hematocrit 40.0 - 53.0 % 39.3 (L)   Platelet Count 150 - 450 10e3/uL 246   (H): Data is abnormally high  (L): Data is abnormally low    SURVIVOR CARE PLAN  Treatment History  Diagnosis: Acute Myeloid Leukemia (AML)  Date of Diagnosis: 8/28/80  Date Therapy Completed: 3/2/1983  Treatment:  1) Chemo: Doxorubicin (500mg/m2), Cyclophosphamide (4.7g/m2), 5-Azacytidine, Methotrexate, Carmustine (BCNU), 6-Mercaptopurine, 6-Thioguanine, Vincristine and Cytarabine.  2) Radiation: Whole Brain (1800 cGy, initiated on 1980)  3) Surgery: Skin and testicular biopsy (1980), testicular biopsy (3/2/1983)  4) Bone Marrow Transplant: None  5) Immunotherapy: None  Known Late-Effects  1. Urinary Incontinence (2007)  2. Chronic Pelvic Pain (2007)  3. Migraine Headaches  4. Vertigo  5. Heart Issues - now all better/resolved (left ventricular dysfunction)  6. Hypertension (high blood pressure)  7. Hyperlipidemia (high cholesterol)  8. Basal Cell Carcinoma (head)  9. Pre cancerous skin lesions (multiple on/and around the head/scalp and R upper anterior shoulder)   New Late-Effects  1. None  Surveillance Plan  1. Second Cancers: There was a risk for second cancers of the blood such as AML or MDS due to chemo drugs such as Cyclophosphamide and Doxorubicin.  However, this risk was highest in the first 5-10 years after the exposure thus are no longer a concern.  In addition, close attention will be paid to this during our yearly history and physical for any concerning signs or symptoms (such as unexplained fevers, bleeding or bruising, extreme fatigue or enlarged glands/swollen lymph nodes). There is a risk of second cancers to the skin, soft tissues and bones within the field of prior radiation.  Thus any new skin findings in the field of radiation should be reported to your dermatologist immediately and any new and unexplained lumps or bumps in the field of radiation should  be brought to the attention of a medical provider immediately. Close attention to sun protection is also strongly recommended (so please continue your regular follow-up with dermatology yearly). Any sudden or dramatic change in vision, hearing, taste or smell should be reported immediately to a health care provider who may evaluate for a second cancer of the brain.  This includes unexplained or persistently worsening of your headaches as well as any neurological change.  Awareness of the risk of thyroid cancer is also important in case the whole brain radiation scattered and effected your thyroid which is in your neck.    2. Liver, Kidney and Bladder Health: This risk is low but a possibility after 6-mercaptopurine exposure.  This will be evaluated with baseline labs (hepatic panel) in 2021.  If normal, these lab tests will only be needed in the future on an as needed basis if you develop symptoms or signs of liver dysfunction. Due to the exposure to chemotherapy drugs that can affect the kidney and bladder, it will be necessary to obtain [facility] kidney health lab (BUN/Cr) at entry into the [facility] but thereafter, if normal, needs to be only sent on an as needed basis.  3. Heart Health: As a result of your exposure to a chemotherapy class of drugs called anthracyclines (such as Doxorubicin) it will be necessary to obtain echos every 2 years. These can be done on the same day as routine appointments with us in the [facility]. This was last done in 2013.  If you notice any new, changed or severe chest pain, shortness of breath or unexplained swelling, please notify a medical provider immediately. Encouraged regular physical activity and discussed the importance of maintaining a healthy weight/BMI and avoiding unhealthy lifestyle choices, such as smoking or excessive alcohol consumption.  4. Bone Health: The exposure to chemotherapy as a child (particularly methotrexate) results in a risk for long-term poor bone health such as  decreased bone mineral density. This will need to be assessed with a baseline DEXA scan in the future which then can be repeated only on an as needed basis. We will check a yearly Vitamin D level as well.  5. Hormonal Health: As a result of the exposure to brain radiation and chemo such as cycophosphamide, there is a risk for hormonal abnormalities.  These will be screened for during our yearly cCSP visit with a thorough history and physical. In addition, yearly thyroid function tests should be ordered as well.  6. Mental Health: Many childhood cancer survivors are at risk for neurocognitive deficits as a result of the disruption to their early development for their necessary oncology treatments.  If any changes are noted regarding neurocognitive function (memory issues, information processing, comprehension, etc) or psychological health please let us and your primary care provider know immediately.  7. Dental Health: As a result of the radiation and chemo exposure, there is risk for increased cavities.  Therefore, it is critical to have routine preventative dental care every 6 months.    8. Eye Health: As a result of the radiation to the brain, there is risk for scatter to the eyes leading to eye late-effects such as cataracts and vision changes.  Therefore, it is critical to have eyes examined yearly by an eye specialist who is aware of the radiation history.  9. Metabolic Health: There is increasing evidence in the childhood cancer survivor literature to suggest that survivors are at increased risk for things related to metabolic health such as obesity, high cholesterol, hormonal imbalance, high BP, poor renal function, etc.  Thus, a lipid panel for cholesterol and triglyceride screening should take place every 1-2 years and if any abnormality is found requiring intervention, we would recommend that the intervention occur immediately and aggressively.  10. Vaccine Health: Please confirm with your PCP that your  vaccines are up to date, including HPV. You can now receive any vaccine that he/she recommends, there are no restrictions.  11. Hearing Health: As a result of brain radiation that could scatter to the inner ear, there is a risk for further hearing loss.   Therefore, it is critical to have your hearing checked if you notice a change.      Assessment and Plan:  43 yo M w/ h/o AML treated with chemo and whole brain radiation doing well with stable late -effects most notable for worsening migraines and signs/sxs of vertigo   PLAN:  1) CBC as per # 1 above - looks good, I have no concerns - your hemoglobin is fine, that is not low enough to have any concerns.  2) CMP as per #2 above - looks good, no concerns.  3) Echo as per #3 above - needed every 2 years, due in Jan 2026  4) Lipid panel (cholesterol) and HgA1c (blood sugar) as per # 9 above - looks good, no serious or immediate concerns.  5) TSH and free T4 as per # 5 above - ordered by Dr. Vega (endo), when you see her next, she will follow up.  6) Vit D level as per #4 above - pending. Please supplement as we discussed today.  7) Neurology referral for updated evaluation and treatment of migraines/vertigo and input on how frequently you need brain MRIs  8) return to see me in one year and in the meantime, as your test results come back in I'll send you [employer] messages with the results, what they mean and next steps.    The longitudinal plan of care for the diagnosis(es)/condition(s) as documented were addressed during this visit. Due to the added complexity in care, I will continue to support Alfredo in the subsequent management and with ongoing continuity of care.    Total time spent on the following services on the date of the encounter:  Preparing to see patient, chart review, review of outside records, Ordering medications, test, procedures, chemotherapy, Referring or communicating with other healthcare professionals, Interpretation of labs, imaging and other  tests, Performing a medically appropriate examination , Counseling and educating the patient/family/caregiver , Documenting clinical information in the electronic or other health record , Communicating results to the patient/family/caregiver , Care coordination , and Total time spent: 95 mins

## 2024-09-17 ENCOUNTER — TELEPHONE (OUTPATIENT)
Dept: NEUROLOGY | Facility: CLINIC | Age: 44
End: 2024-09-17
Payer: COMMERCIAL

## 2024-09-17 NOTE — TELEPHONE ENCOUNTER
[facility] Call Center    Phone Message    May a detailed message be left on voicemail: yes     Reason for Call: Other: Pt is requesting to switch providers from Dr. Sherman to a different provider in [place]. Please review and call to advise at # [phone]     Action Taken: Message routed to:  [facility] Neurology    Travel Screening: Not Applicable     Date of Service:

## 2024-09-24 NOTE — TELEPHONE ENCOUNTER
Writer attempted to reach patient. Voicemail box was full and unable to leave a message to schedule with Neurology.     Patient may schedule with first available within General Neurology- switching clinic locations/providers. Has been approved.     My chart message being sent as well.

## 2024-09-27 NOTE — TELEPHONE ENCOUNTER
Patient confirmed scheduled appointment:  Date: 12/11  Time: 9am  Visit type: New headache  Provider: Veronica Motta  Location:  Neurology  Testing/imaging: no  Additional notes: New headache appointment

## 2024-11-11 DIAGNOSIS — E03.8 CENTRAL HYPOTHYROIDISM: ICD-10-CM

## 2024-11-12 RX ORDER — LEVOTHYROXINE SODIUM 75 UG/1
75 TABLET ORAL DAILY
Qty: 90 TABLET | Refills: 0 | Status: SHIPPED | OUTPATIENT
Start: 2024-11-12

## 2024-11-12 NOTE — TELEPHONE ENCOUNTER
Last Written Prescription Date:  10/10/23  Last Fill Quantity: 90,  # refills: 4   Last office visit: Visit date not found ; last virtual visit: 10/10/2023 with prescribing provider:  Dr. Vega   Future Office Visit: Was due back 10/24    Requested Prescriptions   Pending Prescriptions Disp Refills    levothyroxine (SYNTHROID/LEVOTHROID) 75 MCG tablet [Pharmacy Med Name: LEVOTHYROXINE 75 MCG TABLET] 90 tablet 4     Sig: TAKE 1 TABLET BY MOUTH EVERY DAY       Thyroid Protocol Failed - 11/12/2024 12:09 PM        Failed - Recent (12 mo) or future (90 days) visit within the authorizing provider's specialty     The patient must have completed an in-person or virtual visit within the past 12 months or has a future visit scheduled within the next 90 days with the authorizing provider s specialty.  Urgent care and e-visits do not quality as an office visit for this protocol.          Failed - Normal TSH on file in past 12 months     Recent Labs   Lab Test 01/19/23  0802   TSH 0.07*              Passed - Patient is 12 years or older        Passed - Medication is active on med list        Passed - Medication indicated for associated diagnosis     Medication is associated with one or more of the following diagnoses:  Hypothyroidism  Thyroid stimulating hormone suppression therapy  Thyroid cancer  Acquired atrophy of thyroid             Courtesy refill sent with note to pharm that appt needed for add'l refills.  Also sent mychart msg. Clarita Salazar RN

## 2024-12-05 ASSESSMENT — HEADACHE IMPACT TEST (HIT 6)
HOW OFTEN DID HEADACHS LIMIT CONCENTRATION ON WORK OR DAILY ACTIVITY: VERY OFTEN
HOW OFTEN DO HEADACHES LIMIT YOUR DAILY ACTIVITIES: VERY OFTEN
WHEN YOU HAVE HEADACHES HOW OFTEN IS THE PAIN SEVERE: VERY OFTEN
HIT6 TOTAL SCORE: 68
HOW OFTEN HAVE YOU FELT TOO TIRED TO WORK BECAUSE OF YOUR HEADACHES: VERY OFTEN
WHEN YOU HAVE HEADACHES HOW OFTEN IS THE PAIN SEVERE: VERY OFTEN
WHEN YOU HAVE A HEADACHE HOW OFTEN DO YOU WISH YOU COULD LIE DOWN: VERY OFTEN
HOW OFTEN DO HEADACHES LIMIT YOUR DAILY ACTIVITIES: VERY OFTEN
HOW OFTEN HAVE YOU FELT TOO TIRED TO WORK BECAUSE OF YOUR HEADACHES: VERY OFTEN
HOW OFTEN HAVE YOU FELT FED UP OR IRRITATED BECAUSE OF YOUR HEADACHES: ALWAYS
HOW OFTEN DID HEADACHS LIMIT CONCENTRATION ON WORK OR DAILY ACTIVITY: VERY OFTEN
HIT6 TOTAL SCORE: 68
WHEN YOU HAVE A HEADACHE HOW OFTEN DO YOU WISH YOU COULD LIE DOWN: VERY OFTEN
HOW OFTEN HAVE YOU FELT FED UP OR IRRITATED BECAUSE OF YOUR HEADACHES: ALWAYS

## 2024-12-05 ASSESSMENT — MIGRAINE DISABILITY ASSESSMENT (MIDAS)
HOW MANY DAYS WAS YOUR PRODUCTIVITY CUT IN HALF BECAUSE OF HEADACHES: 5
HOW OFTEN WERE SOCIAL ACTIVITIES MISSED DUE TO HEADACHES: 6
TOTAL SCORE: 51
HOW MANY DAYS DID YOU NOT DO HOUSEWORK BECAUSE OF HEADACHES: 12
ON A SCALE FROM 0-10 ON AVERAGE HOW PAINFUL WERE HEADACHES: 8
HOW MANY DAYS IN THE PAST 3 MONTHS HAVE YOU HAD A HEADACHE: 36
HOW MANY DAYS WAS HOUSEWORK PRODUCTIVITY CUT IN HALF DUE TO HEADACHES: 24
HOW MANY DAYS DID YOU MISS WORK OR SCHOOL BECAUSE OF HEADACHES: 4

## 2024-12-11 ENCOUNTER — OFFICE VISIT (OUTPATIENT)
Dept: NEUROLOGY | Facility: CLINIC | Age: 44
End: 2024-12-11
Attending: PEDIATRICS
Payer: COMMERCIAL

## 2024-12-11 VITALS — SYSTOLIC BLOOD PRESSURE: 144 MMHG | OXYGEN SATURATION: 95 % | DIASTOLIC BLOOD PRESSURE: 90 MMHG | HEART RATE: 95 BPM

## 2024-12-11 DIAGNOSIS — Z91.89 AT RISK FOR BONE DENSITY LOSS: ICD-10-CM

## 2024-12-11 DIAGNOSIS — Z92.21 STATUS POST CHEMOTHERAPY: ICD-10-CM

## 2024-12-11 DIAGNOSIS — Z91.89 AT RISK FOR METABOLIC IMBALANCE: ICD-10-CM

## 2024-12-11 DIAGNOSIS — Z91.89 AT RISK FOR SECONDARY CANCER: ICD-10-CM

## 2024-12-11 DIAGNOSIS — Z91.89 AT HIGH RISK FOR NEUROLOGICAL DEFICIT: ICD-10-CM

## 2024-12-11 DIAGNOSIS — Z91.89 AT RISK FOR HEART FAILURE: ICD-10-CM

## 2024-12-11 DIAGNOSIS — C92.01 AML (ACUTE MYELOID LEUKEMIA) IN REMISSION (H): ICD-10-CM

## 2024-12-11 DIAGNOSIS — Z91.89 AT RISK FOR ALTERATION IN ENDOCRINE FUNCTION: ICD-10-CM

## 2024-12-11 DIAGNOSIS — G43.809 VESTIBULAR MIGRAINE: Primary | ICD-10-CM

## 2024-12-11 DIAGNOSIS — Z92.3 HISTORY OF RADIATION EXPOSURE: ICD-10-CM

## 2024-12-11 PROCEDURE — G2211 COMPLEX E/M VISIT ADD ON: HCPCS

## 2024-12-11 PROCEDURE — 99215 OFFICE O/P EST HI 40 MIN: CPT

## 2024-12-11 RX ORDER — RIZATRIPTAN BENZOATE 10 MG/1
TABLET ORAL
COMMUNITY

## 2024-12-11 RX ORDER — SUMATRIPTAN SUCCINATE 100 MG/1
100 TABLET ORAL
Qty: 18 TABLET | Refills: 3 | Status: SHIPPED | OUTPATIENT
Start: 2024-12-11

## 2024-12-11 RX ORDER — TOPIRAMATE 25 MG/1
TABLET, FILM COATED ORAL
Qty: 120 TABLET | Refills: 1 | Status: SHIPPED | OUTPATIENT
Start: 2024-12-11

## 2024-12-11 NOTE — LETTER
December 11, 2024      Alferdo Brewer  [id number] [address] [id number]        To Whom It May Concern:    Alfredo Brewer  was seen on 12/11/2024 and is under my care.  As you are aware, Mr. Brewer has been struggling with chronic migraines and vertigo. With these chronic conditions, Mr. Brewer experiences intermittent symptom flare ups. Symptoms may result in unpredictable and unplanned absences from work as symptoms can impair his daily functioning. The length and duration of flare ups is variable and may last hours or days depending upon the severity. I am recommending that Mr. Brewer begin some new therapies in attempts to better manage his chronic symptoms, though upon starting these therapies, symptoms may temporarily worsen before improving, and therefore may result in more unplanned absences from work. I will continue to work with Mr. Brewer to better manage his symptoms and improve his workability and quality of life. I plan to follow up with him in 2 months time to monitor his progress.         Sincerely,      Veronica Motta PA-C  Headache Neurology  [facility]

## 2024-12-11 NOTE — PATIENT INSTRUCTIONS
For symptom prevention:  - Continue with same amitriptyline dose  - Start topiramate     Topiramate:  Week 1: Take 25 mg (1 tablet) nightly   Week 2: Take 50 mg (2 tablets) nightly  Week 3: Take 75 mg (3 tablets) nightly  Week 4: Take 100 mg (4 tablets) nightly then stay at this dose.  Stay at the best tolerated dose.   Side effects: numbness or tingling at fingers, toes, face; fatigue; decreased appetite or weight loss; difficulty with word-finding; increased risk of kidney stones    When you have a worse headache day:  - Sumatriptan 100 mg at onset of migraine, may repeat dose after 2 hours if needed, can use up to 9 days per month (try this instead of rizatriptan)    Will start vestibular therapy through [facility] but if locations don't work well for you, let me know and I can send referral to [facility]

## 2024-12-11 NOTE — NURSING NOTE
"Alfredo Brewer is a 44 year old male who presents for:  Chief Complaint   Patient presents with    Headache     New Headache Headache/Migraine   Arti Pimentel MD in  [facility], referred by   Patient scheduled   Records in [facility]           Initial Vitals:  BP (!) 144/90   Pulse 95   SpO2 95%  Estimated body mass index is 27.76 kg/m  as calculated from the following:    Height as of 2/15/24: 1.715 m (5' 7.5\").    Weight as of 9/6/24: 81.6 kg (179 lb 14.4 oz).. There is no height or weight on file to calculate BSA. BP completed using cuff size: regular  Data Unavailable    Nursing Comments:     Keshia Gonzalez MA   "

## 2024-12-11 NOTE — LETTER
12/11/2024      Alfredo Brewer  [address] [facility] [address]      Dear Colleague,    Thank you for referring your patient, Alfredo Brewer, to the [facility]. Please see a copy of my visit note below.    [facility]   Headache Neurology Consult    December 11, 2024     Alfredo Brewer MRN# [id number]   YOB: 1980 Age: 44 year old     Referring provider: Arti Pimentel          Assessment and Recommendations:     Alfredo Brewer is a 44 year old male who presents for further evaluation of headaches and vertigo.    Discussed that his symptoms could represent vestibular migraine.  He has had vestibular testing and evaluation in the past and vestibular therapy has been somewhat helpful previously, though symptoms have worsened over the past year.    His neurologic examination is overall intact today.  He recently had an updated MRI brain with and without contrast which was unremarkable and reassuring.  I did recommend he restart vestibular therapy as it has been several years since he last completed this.     We discussed the following symptomatic treatment strategy:  - For acute treatment of migraine, he can try sumatriptan 100 mg taken at onset of migraine, with a repeat dose taken after 2 hours if needed.  Use should not exceed more than 9 days/month.  Will see if this is more effective than rizatriptan.  Side effects reviewed.    - Alternatively, consider Nurtec or Ubrelvy.  - For acute treatment of vertigo, he may continue to use meclizine as needed.    - For migraine prophylaxis, I recommend a trial of topiramate.  He will start with 25 mg nightly, and may increase the dose by 25 mg each week, as needed and as tolerated, up to 100 mg nightly.  Side effects reviewed.  Recommended treatment trial of 6 to 8 weeks prior to determining effectiveness.  - He will continue on the same amitriptyline dose (40 mg nightly).  Eventually could  consider tapering off of this as he does not complain of neuropathy anymore and this does not seem to be effective for migraine prophylaxis.  - He currently takes carvedilol so we will avoid additional antihypertensive agents.  - If topiramate is not effective or not tolerated, could consider CGRP inhibitors or botulinum toxin injections following a chronic migraine protocol.    The longitudinal plan of care for the diagnosis(es)/condition(s) as documented were addressed during this visit. Due to the added complexity in care, I will continue to support Alfredo in the subsequent management and with ongoing continuity of care.     Follow-up in 2 months.     I spent 65 minutes today on patient care, chart review, and documentation.     Veronica Motta PA-C  Headache Neurology  [facility]            Chief Complaint:     Chief Complaint   Patient presents with     Headache     New Headache Headache/Migraine   Arti Pimentel MD in  [facility], referred by   Patient scheduled   Records in Epic              History is obtained from the patient and medical record.      Alfredo Brewer is a 44 year old male who presents for further evaluation of migraines and vertigo.    He reports he has been experiencing vertigo since 2011, and migraines since 2007.    Regarding his vertigo, he describes a sensation where the room seems like its being jostled up and down, as if he were a bobblehead though his head is staying still. This occurs 3-4 times per week lasting about 6-24 hours with waxing and waning severity.  He can get more intense bouts of vertigo where everything is spinning and these bouts can last 3-4 days in duration.     He gets nausea, but not too frequently. This fall, he had a severe bout of vertigo with nausea leading to vomiting.     He also has a history of migraine headaches. Migraines will start in the frontal and bitemporal regions, though will focus behind his eye. They are a sharp pain  lasting up to 2 days. He rarely has nausea with migraine. He thinks he gets a migraine about 30-50% of the time associated with his bobblehead-effect vertigo, though he can occasionally get migraines without dizziness.     He can have photophobia, phonophobia with bouts of vertigo, as well as with his migraines.    Denies typical aura or other visual disturbances besides visual vestibular symptoms.   Denies focal paresthesias or weakness. No pulsatile or ringing tinnitus.  Symptoms are not worsened in a grocery store or with scrolling on screens, though going up stairs or looking up several flights of stairs can be bothersome.    He can often tell if it is going to be a good day or bad day upon waking regarding his vertigo. He can start to feel dizzy upon getting out of bed. Vertigo can be triggered by certain movements, like bending over, reaching overhead.     He has followed with [facility] for vestibular [facility]. He has just been doing home exercises recently, has not been back to [facility] for several years.     He will treat with meclizine and this is somewhat helpful for dulling severity of symptoms, but does not shorted duration of symptoms.     He has rizatriptan for acute treatment as needed. It is only somewhat helpful.    He was recommended to try topiramate at one point but did not start due to concern for possible side effects.  Was prescribed Nurtec or Ubrelvy but was unable to get covered by insurance. Does not recall trying other triptans.    He takes amitriptyline 40 mg nightly for neuropathy. Previously did not tolerate nortriptyline as this was more constipating.     He has a history of AML in remission. He follows with Dr. Pimentel of oncology.   He recalls higher doses of levothyroxine worsened vertigo. He has been maintained on levothyroxine 75 mcg but feels as though this is suboptimal thyroid therapy.   He takes carvedilol for hypertension, rosuvastatin for hyperlipidemia.      Sleep could be better, but is not terrible. He is a light sleeper. Mild snoring.     Drinks about 16 oz of coffee or less daily.    No prior history of significant head injuries, but did have radiation therapy as a young child.     No known family history of headaches.     His last eye exam was about 2-3 years ago.        12/5/2024    11:08 AM   HIT-6   When you have headaches, how often is the pain severe 11    How often do headaches limit your ability to do usual daily activities including household work, work, school, or social activities? 11    When you have a headache, how often do you wish you could lie down? 11    In the past 4 weeks, how often have you felt too tired to do work or daily activities because of your headaches 11    In the past 4 weeks, how often have you felt fed up or irritated because of your headaches 13    In the past 4 weeks, how often did headaches limit your ability to concentrate on work or daily activities 11    HIT-6 Total Score 68        Patient-reported           12/5/2024    11:18 AM   MIDAS - in the past three months:   On how many days did you miss work or school because of your headaches? 4   How many days was your productivity at work or school reduced by half or more because of your headaches? 5   On how many days did you not do household work because of your headaches? 12   How many days was your productivity in household work reduced by half or more because of your headaches? 24   On how many days did you miss family, social, or leisure activities because of your headaches? 6   On how many days did you have a headache? 36   On a scale of 0-10, on average how painful were these headaches? 8   MIDAS Score 51 (IV - Severe Disability)                Past Medical History:   No past medical history on file.           Past Surgical History:   No past surgical history on file.          Social History:     Works full time for a [employer] company.     Social History      Socioeconomic History     Marital status:      Spouse name: Not on file     Number of children: Not on file     Years of education: Not on file     Highest education level: Not on file   Occupational History     Not on file   Tobacco Use     Smoking status: Never     Smokeless tobacco: Never   Substance and Sexual Activity     Alcohol use: Yes     Comment: a few beers a week     Drug use: Not on file     Sexual activity: Not on file   Other Topics Concern     Not on file   Social History Narrative     Not on file     Social Drivers of Health     Financial Resource Strain: Not At Risk (11/16/2023)    [facility]    Financial Resource Strain      Is it hard for you to pay for the very basics like food, housing, medical care or heating?: No   Food Insecurity: No Food Insecurity (11/15/2024)    Received from [facility]    Hunger Vital Sign      Worried About Running Out of Food in the Last Year: Never true      Ran Out of Food in the Last Year: Never true   Transportation Needs: No Transportation Needs (11/15/2024)    Received from [facility]    PRAPARE - Transportation      Lack of Transportation (Medical): No      Lack of Transportation (Non-Medical): No   Physical Activity: Not on file   Stress: Not on file   Social Connections: Not on file   Interpersonal Safety: Not on file   Housing Stability: Low Risk  (11/15/2024)    Received from [facility]    Housing Stability Vital Sign      Unable to Pay for Housing in the Last Year: No      Number of Times Moved in the Last Year: 0      Homeless in the Last Year: No             Family History:   No family history on file.          Allergies:      Allergies   Allergen Reactions     Codeine      Nausea and vomiting     Erythromycin      Stomach pain     Prednisone      Stomach pain     Vicodin [Acetaminophen]      Stomach pain     Wheat GI Disturbance             Medications:     Current Outpatient Medications:       albuterol (PROAIR HFA/PROVENTIL HFA/VENTOLIN HFA) 108 (90 Base) MCG/ACT inhaler, Inhale 1-2 puffs into the lungs as needed, Disp: , Rfl:      amitriptyline (ELAVIL) 25 MG tablet, Take 40 mg by mouth at bedtime. 4 10mg at night, Disp: , Rfl:      carvedilol (COREG) 6.25 MG tablet, Take 1.5 tablets (9.375 mg) by mouth 2 times daily (with meals) Appointment needed for further refills, Disp: 270 tablet, Rfl: 3     FISH OIL-VITAMIN D PO, Take by mouth daily, Disp: , Rfl:      lactobacillus rhamnosus, GG, (CULTURELL) capsule, Take 1 capsule by mouth daily, Disp: , Rfl:      levothyroxine (SYNTHROID/LEVOTHROID) 75 MCG tablet, TAKE 1 TABLET BY MOUTH EVERY DAY, Disp: 90 tablet, Rfl: 0     meclizine (ANTIVERT) 25 MG tablet, Take 1 tablet (25 mg) by mouth every 6 hours as needed for dizziness, Disp: 30 tablet, Rfl: 1     omeprazole (PRILOSEC) 20 MG DR capsule, 20 mg daily, Disp: , Rfl:      rizatriptan (MAXALT) 10 MG tablet, PLEASE SEE ATTACHED FOR DETAILED DIRECTIONS, Disp: , Rfl:      rosuvastatin (CRESTOR) 40 MG tablet, Take 1 tablet (40 mg) by mouth daily, Disp: 90 tablet, Rfl: 3          Physical Exam:   BP (!) 144/90   Pulse 95   SpO2 95%      General: In no acute distress.  Head: Normocephalic, atraumatic. No radiating pain with palpation over the supraorbital notches, occipital nerves. Temporal pulses intact.   Neck: Normal range of motion with lateral head movements and neck flexion.  Eyes: No conjunctival injection, no scleral icterus.     Neurologic Exam:  Mental Status Exam: Alert, awake and oriented to situation. No dysarthria. Speech of normal fluency.  Cranial Nerves: Fundoscopic exam with clear disc margins bilaterally. PERRLA, EOMs intact, no nystagmus, facial movements symmetric, facial sensation intact to light touch, hearing intact to conversation, trapezius and SCMs 5/5 bilaterally, tongue midline and fully mobile. No tongue atrophy or fasciculations.   Motor: Normal tone in all four extremities, no  atrophy or fasciculations. 5/5 strength bilaterally in shoulder abduction, elbow flexion and extension, wrist flexion and extension, hip flexion, knee flexion and extension, dorsiflexion and plantarflexion. No tremors or abnormal movements noted.  Sensory: Sensation intact to light touch on arms and legs bilaterally.   Coordination: Finger-nose-finger intact bilaterally. Rapidly alternating movements intact bilaterally in the upper extremities. Normal finger tapping bilaterally. Normal Romberg.  Reflexes: 2+ and symmetric in triceps, biceps, brachioradialis, patellar, and Achilles. Plantar reflexes are downgoing bilaterally.  Gait: Normal gait. Able to toe and heel walk. Normal tandem gait.            Data:     MR Brain W/WO IV Cont  INDICATION: headaches, vertigo, eval for brainstem lesion      TECHNIQUE:  MRI of the head with and without contrast using IAC protocol, 9 mL GADOBUTROL 1 MMOL/ML IV SOLN.    COMPARISON: 06/21/2019.    FINDINGS:  Normal diffusion. Small focus of nonspecific T2/FLAIR signal hyperintensity in the left frontal subcortical white matter, within normal limits for the patient's age and unchanged compared to prior. Normal pre-pontine cistern. Normal vaughn. Normal internal auditory canals. Partially empty sella variant. Normal appearance of the intracanalicular seventh and eighth cranial nerves. The ventricular system, sulci, and cisterns are normal caliber and configuration. Normal flow voids within the major intracranial vessels. Normal enhancement. The visualized calvarium, paranasal sinuses, skull base, and upper cervical spine are unremarkable.    IMPRESSION:      1. Partially empty sella variant.  2. Otherwise unremarkable MRI of the head with and without IV contrast.        Again, thank you for allowing me to participate in the care of your patient.        Sincerely,        VEE HOPKINS PA-C

## 2024-12-11 NOTE — PROGRESS NOTES
[facility]   Headache Neurology Consult    December 11, 2024     Alfredo Brewer MRN# [id number]   YOB: 1980 Age: 44 year old     Referring provider: Arti Pimentel          Assessment and Recommendations:     Alfredo Brewer is a 44 year old male who presents for further evaluation of headaches and vertigo.    Discussed that his symptoms could represent vestibular migraine.  He has had vestibular testing and evaluation in the past and vestibular therapy has been somewhat helpful previously, though symptoms have worsened over the past year.    His neurologic examination is overall intact today.  He recently had an updated MRI brain with and without contrast which was unremarkable and reassuring.  I did recommend he restart vestibular therapy as it has been several years since he last completed this.     We discussed the following symptomatic treatment strategy:  - For acute treatment of migraine, he can try sumatriptan 100 mg taken at onset of migraine, with a repeat dose taken after 2 hours if needed.  Use should not exceed more than 9 days/month.  Will see if this is more effective than rizatriptan.  Side effects reviewed.    - Alternatively, consider Nurtec or Ubrelvy.  - For acute treatment of vertigo, he may continue to use meclizine as needed.    - For migraine prophylaxis, I recommend a trial of topiramate.  He will start with 25 mg nightly, and may increase the dose by 25 mg each week, as needed and as tolerated, up to 100 mg nightly.  Side effects reviewed.  Recommended treatment trial of 6 to 8 weeks prior to determining effectiveness.  - He will continue on the same amitriptyline dose (40 mg nightly).  Eventually could consider tapering off of this as he does not complain of neuropathy anymore and this does not seem to be effective for migraine prophylaxis.  - He currently takes carvedilol so we will avoid additional antihypertensive agents.  - If topiramate is not  effective or not tolerated, could consider CGRP inhibitors or botulinum toxin injections following a chronic migraine protocol.    The longitudinal plan of care for the diagnosis(es)/condition(s) as documented were addressed during this visit. Due to the added complexity in care, I will continue to support Alfredo in the subsequent management and with ongoing continuity of care.     Follow-up in 2 months.     I spent 65 minutes today on patient care, chart review, and documentation.     Veronica Motta PA-C  Headache Neurology  [facility]            Chief Complaint:     Chief Complaint   Patient presents with    Headache     New Headache Headache/Migraine   Arti Pimentel MD in  [facility] LONG TERM, referred by   Patient scheduled   Records in Epic              History is obtained from the patient and medical record.      Alfredo Brewer is a 44 year old male who presents for further evaluation of migraines and vertigo.    He reports he has been experiencing vertigo since 2011, and migraines since 2007.    Regarding his vertigo, he describes a sensation where the room seems like its being jostled up and down, as if he were a bobblehead though his head is staying still. This occurs 3-4 times per week lasting about 6-24 hours with waxing and waning severity.  He can get more intense bouts of vertigo where everything is spinning and these bouts can last 3-4 days in duration.     He gets nausea, but not too frequently. This fall, he had a severe bout of vertigo with nausea leading to vomiting.     He also has a history of migraine headaches. Migraines will start in the frontal and bitemporal regions, though will focus behind his eye. They are a sharp pain lasting up to 2 days. He rarely has nausea with migraine. He thinks he gets a migraine about 30-50% of the time associated with his bobblehead-effect vertigo, though he can occasionally get migraines without dizziness.     He can have photophobia,  phonophobia with bouts of vertigo, as well as with his migraines.    Denies typical aura or other visual disturbances besides visual vestibular symptoms.   Denies focal paresthesias or weakness. No pulsatile or ringing tinnitus.  Symptoms are not worsened in a grocery store or with scrolling on screens, though going up stairs or looking up several flights of stairs can be bothersome.    He can often tell if it is going to be a good day or bad day upon waking regarding his vertigo. He can start to feel dizzy upon getting out of bed. Vertigo can be triggered by certain movements, like bending over, reaching overhead.     He has followed with [facility] for vestibular [facility]. He has just been doing home exercises recently, has not been back to [facility] for several years.     He will treat with meclizine and this is somewhat helpful for dulling severity of symptoms, but does not shorted duration of symptoms.     He has rizatriptan for acute treatment as needed. It is only somewhat helpful.    He was recommended to try topiramate at one point but did not start due to concern for possible side effects.  Was prescribed Nurtec or Ubrelvy but was unable to get covered by insurance. Does not recall trying other triptans.    He takes amitriptyline 40 mg nightly for neuropathy. Previously did not tolerate nortriptyline as this was more constipating.     He has a history of AML in remission. He follows with Dr. Pimentel of oncology.   He recalls higher doses of levothyroxine worsened vertigo. He has been maintained on levothyroxine 75 mcg but feels as though this is suboptimal thyroid therapy.   He takes carvedilol for hypertension, rosuvastatin for hyperlipidemia.     Sleep could be better, but is not terrible. He is a light sleeper. Mild snoring.     Drinks about 16 oz of coffee or less daily.    No prior history of significant head injuries, but did have radiation therapy as a young child.     No known  family history of headaches.     His last eye exam was about 2-3 years ago.        12/5/2024    11:08 AM   HIT-6   When you have headaches, how often is the pain severe 11    How often do headaches limit your ability to do usual daily activities including household work, work, school, or social activities? 11    When you have a headache, how often do you wish you could lie down? 11    In the past 4 weeks, how often have you felt too tired to do work or daily activities because of your headaches 11    In the past 4 weeks, how often have you felt fed up or irritated because of your headaches 13    In the past 4 weeks, how often did headaches limit your ability to concentrate on work or daily activities 11    HIT-6 Total Score 68        Patient-reported           12/5/2024    11:18 AM   MIDAS - in the past three months:   On how many days did you miss work or school because of your headaches? 4   How many days was your productivity at work or school reduced by half or more because of your headaches? 5   On how many days did you not do household work because of your headaches? 12   How many days was your productivity in household work reduced by half or more because of your headaches? 24   On how many days did you miss family, social, or leisure activities because of your headaches? 6   On how many days did you have a headache? 36   On a scale of 0-10, on average how painful were these headaches? 8   MIDAS Score 51 (IV - Severe Disability)                Past Medical History:   No past medical history on file.           Past Surgical History:   No past surgical history on file.          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     Social History     Socioeconomic History    Marital status:      Spouse name: Not on file    Number of children: Not on file    Years of education: Not on file    Highest education level: Not on file   Occupational History    Not on file   Tobacco Use     Smoking status: Never    Smokeless tobacco: Never   Substance and Sexual Activity    Alcohol use: Yes     Comment: a few beers a week    Drug use: Not on file    Sexual activity: Not on file   Other Topics Concern    Not on file   Social History Narrative    Not on file     Social Drivers of Health     Financial Resource Strain: Not At Risk (11/16/2023)    Received from [email or web address]    Financial Resource Strain     Is it hard for you to pay for the very basics like food, housing, medical care or heating?: No   Food Insecurity: No Food Insecurity (11/15/2024)    Received from [facility]    Hunger Vital Sign     Worried About Running Out of Food in the Last Year: Never true     Ran Out of Food in the Last Year: Never true   Transportation Needs: No Transportation Needs (11/15/2024)    Received from [facility]    PRAPARE - Transportation     Lack of Transportation (Medical): No     Lack of Transportation (Non-Medical): No   Physical Activity: Not on file   Stress: Not on file   Social Connections: Not on file   Interpersonal Safety: Not on file   Housing Stability: Low Risk  (11/15/2024)    Received from [facility]    Housing Stability Vital Sign     Unable to Pay for Housing in the Last Year: No     Number of Times Moved in the Last Year: 0     Homeless in the Last Year: No             Family History:   No family history on file.          Allergies:      Allergies   Allergen Reactions    Codeine      Nausea and vomiting    Erythromycin      Stomach pain    Prednisone      Stomach pain    Vicodin [Acetaminophen]      Stomach pain    Wheat GI Disturbance             Medications:     Current Outpatient Medications:     albuterol (PROAIR HFA/PROVENTIL HFA/VENTOLIN HFA) 108 (90 Base) MCG/ACT inhaler, Inhale 1-2 puffs into the lungs as needed, Disp: , Rfl:     amitriptyline (ELAVIL) 25 MG tablet, Take 40 mg by mouth at bedtime. 4 10mg at night, Disp: , Rfl:     carvedilol (COREG) 6.25 MG  tablet, Take 1.5 tablets (9.375 mg) by mouth 2 times daily (with meals) Appointment needed for further refills, Disp: 270 tablet, Rfl: 3    FISH OIL-VITAMIN D PO, Take by mouth daily, Disp: , Rfl:     lactobacillus rhamnosus, GG, (CULTURELL) capsule, Take 1 capsule by mouth daily, Disp: , Rfl:     levothyroxine (SYNTHROID/LEVOTHROID) 75 MCG tablet, TAKE 1 TABLET BY MOUTH EVERY DAY, Disp: 90 tablet, Rfl: 0    meclizine (ANTIVERT) 25 MG tablet, Take 1 tablet (25 mg) by mouth every 6 hours as needed for dizziness, Disp: 30 tablet, Rfl: 1    omeprazole (PRILOSEC) 20 MG DR capsule, 20 mg daily, Disp: , Rfl:     rizatriptan (MAXALT) 10 MG tablet, PLEASE SEE ATTACHED FOR DETAILED DIRECTIONS, Disp: , Rfl:     rosuvastatin (CRESTOR) 40 MG tablet, Take 1 tablet (40 mg) by mouth daily, Disp: 90 tablet, Rfl: 3          Physical Exam:   BP (!) 144/90   Pulse 95   SpO2 95%      General: In no acute distress.  Head: Normocephalic, atraumatic. No radiating pain with palpation over the supraorbital notches, occipital nerves. Temporal pulses intact.   Neck: Normal range of motion with lateral head movements and neck flexion.  Eyes: No conjunctival injection, no scleral icterus.     Neurologic Exam:  Mental Status Exam: Alert, awake and oriented to situation. No dysarthria. Speech of normal fluency.  Cranial Nerves: Fundoscopic exam with clear disc margins bilaterally. PERRLA, EOMs intact, no nystagmus, facial movements symmetric, facial sensation intact to light touch, hearing intact to conversation, trapezius and SCMs 5/5 bilaterally, tongue midline and fully mobile. No tongue atrophy or fasciculations.   Motor: Normal tone in all four extremities, no atrophy or fasciculations. 5/5 strength bilaterally in shoulder abduction, elbow flexion and extension, wrist flexion and extension, hip flexion, knee flexion and extension, dorsiflexion and plantarflexion. No tremors or abnormal movements noted.  Sensory: Sensation intact to light  touch on arms and legs bilaterally.   Coordination: Finger-nose-finger intact bilaterally. Rapidly alternating movements intact bilaterally in the upper extremities. Normal finger tapping bilaterally. Normal Romberg.  Reflexes: 2+ and symmetric in triceps, biceps, brachioradialis, patellar, and Achilles. Plantar reflexes are downgoing bilaterally.  Gait: Normal gait. Able to toe and heel walk. Normal tandem gait.            Data:     MR Brain W/WO IV Cont  INDICATION: headaches, vertigo, eval for brainstem lesion      TECHNIQUE:  MRI of the head with and without contrast using IAC protocol, 9 mL GADOBUTROL 1 MMOL/ML IV SOLN.    COMPARISON: 06/21/2019.    FINDINGS:  Normal diffusion. Small focus of nonspecific T2/FLAIR signal hyperintensity in the left frontal subcortical white matter, within normal limits for the patient's age and unchanged compared to prior. Normal pre-pontine cistern. Normal vaughn. Normal internal auditory canals. Partially empty sella variant. Normal appearance of the intracanalicular seventh and eighth cranial nerves. The ventricular system, sulci, and cisterns are normal caliber and configuration. Normal flow voids within the major intracranial vessels. Normal enhancement. The visualized calvarium, paranasal sinuses, skull base, and upper cervical spine are unremarkable.    IMPRESSION:      1. Partially empty sella variant.  2. Otherwise unremarkable MRI of the head with and without IV contrast.

## 2024-12-31 ENCOUNTER — THERAPY VISIT (OUTPATIENT)
Dept: PHYSICAL THERAPY | Facility: CLINIC | Age: 44
End: 2024-12-31
Payer: COMMERCIAL

## 2024-12-31 DIAGNOSIS — G43.109 MIGRAINE WITH TYPICAL AURA: Primary | ICD-10-CM

## 2024-12-31 PROCEDURE — 97112 NEUROMUSCULAR REEDUCATION: CPT | Mod: GP | Performed by: PHYSICAL THERAPIST

## 2024-12-31 PROCEDURE — 97161 PT EVAL LOW COMPLEX 20 MIN: CPT | Mod: GP | Performed by: PHYSICAL THERAPIST

## 2024-12-31 NOTE — PROGRESS NOTES
PHYSICAL THERAPY EVALUATION  Type of Visit: Evaluation              Subjective         Presenting condition or subjective complaint: (Patient-Rptd) vertigo symptoms  Date of onset:      Relevant medical history: (Patient-Rptd) Cancer; Dizziness; Heart problems; High blood pressure; Migraines or headaches; Neck injury; Radiation treatment; Severe dizziness; Severe headaches; Thyroid problems   Dates & types of surgery: (Patient-Rptd) 2006- removal of appendix,  [id number] chemo ports, [id number] skin cancer    Prior diagnostic imaging/testing results: (Patient-Rptd) MRI     Prior therapy history for the same diagnosis, illness or injury: (Patient-Rptd) Yes (Patient-Rptd) physical therapy    Prior Level of Function  Transfers:   Ambulation:   ADL:   IADL:     Living Environment  Social support: (Patient-Rptd) With a significant other or spouse   Type of home: (Patient-Rptd) House; 2-story   Stairs to enter the home: (Patient-Rptd) No       Ramp: (Patient-Rptd) No   Stairs inside the home: (Patient-Rptd) Yes (Patient-Rptd) 14 Is there a railing: (Patient-Rptd) Yes     Help at home: (Patient-Rptd) None  Equipment owned:       Employment: (Patient-Rptd) Yes (Patient-Rptd) insurance   Hobbies/Interests: (Patient-Rptd) spending time with wife and dog.  mowing the lawn and going for drives    Patient goals for therapy: (Patient-Rptd) better quality of life    Pain assessment:      Objective      Cognitive Status Examination  Orientation:    Level of Consciousness:   Follows Commands and Answers Questions:   Personal Safety and Judgement:   Memory:       BALANCE:         SENSATION:     REFLEXES:   MUSCLE TONE:        VESTIBULAR EVALUATION  ADDITIONAL HISTORY:  Description of symptoms: (Patient-Rptd) Off balance; Nausea or vomiting; Attacks of dizziness; Blurry or jumping vision; I feel like the room is spinning; Light-headedness  Dizzy attacks:   Start: (Patient-Rptd) 2011   Last attack: (Patient-Rptd) 12/26 -  today   Frequency of occurrences: (Patient-Rptd) few times a week   Length of attack: (Patient-Rptd) from 1 day to 7 days  Difficulty hearing:    Noise in ears? (Patient-Rptd) No    Alleviates symptoms: (Patient-Rptd) laying down on my left side.   use of meclizine  Worsens symptoms: (Patient-Rptd) looking down and looking up  Activities that bring on symptoms: (Patient-Rptd) Riding in an elevator; Bending over; Reaching up; Walking up or down stairs; Unstable surfaces; Other  (Patient-Rptd) sudden movement/body getting jolted      Sxs worse in 2024 his QOL much workse aroudn the house and work. Walks without AD.   Every day wondiner he can drive to work seeing straight and talking to boss about working remote which he techniqically not a part of his job descrooitnoeind  But does work remot 2 days ad week nadn 3 days a week supposed to drive into weeking going tin person 2x/week vs 3x/week because of his sxs.  Reports stability and feeling like room is spinning or jostled feeling and unable to do his job working from home unable to work remomrtle.     Seen in 2021 by this therapist for same sys but getting worse now.  Dx with lukimia 2021 followed for cancer since 2024 and this apt 3 yrs ago things were fairly normal and had a lot of follow ups with new lukimia with radiation and chemo  Neurologist dx possible migrainescontributing vertigo. He is tracking sxs migirnained around dizziness. Reports 70% of time migraine occurs not a dull HA with imbalance.  Dec 11th starting topomerate misty up changed from another migraine meciation. Topamerate preventaitive is new, has a new abortive migrame medication supatriptan. Amatryptolin for 10-13 years.  No routine with exerSponduuse but walks the dog daily.  Doesn't keep a migraine diary but tracks his sxs wnad knows red wine is a trigger, and various medications ne has been on over the past couple of years medication triggers hich chelestrol and thryoid medication,  doesn't  deat chocolate, likes cheese bu has moderation, stress can be a trigger  Reports with vertigo spinnign nausea as wel  Can feel movements when he isn't movemnt like a bobblehead.  Takes meclzinies for vertigo for 12 years takes almost daily doesn't feel comforatble driving today.   12/26/24 work up with episode of vertigo that hsan't resovled yet.   Taking it more days that not over the past month. Worse in the morniings.   Didknt know vminilzie can contribut to dizziness overall traken chroncially.     Headache: 2/10 baseline  Dizziness: floaty feeling 5-6/10 doesn't want to drive today.  Meclzines in systme   Reports laying on R side feeling breif spinning      Pertinent history of current vestibular problem:   DHI: Total Score: (Patient-Rptd) 66    Cervicogenic Screen    Neck ROM    Vertebral Artery Test    Alar Ligament Test    Transverse Ligament Test    Distraction    Neck Torsion Test (head still, body rotating)    Neck Torsion Test (head and body rotating)         Oculomotor Screen    Ocular ROM    Smooth Pursuit    Saccades    VOR    VOR Cancellation    Head Impulse Test    Convergence Testing         Infrared Goggle Exam Vestibular Suppressant in Last 24 Hours? Yes  Exam Completed With: Infrared goggles   Spontaneous Nystagmus Negative   Gaze Evoked Nystagmus Horizontal R mild   Head Shake Horizontal Nystagmus Horizontal R minimal dizziness   Positional Testing    Supine Head-Hanging Test     Left Right   Afton-Hallpike Horizontal L mild persistineind   Retunr to sitt R nyagmugs and veritgo (revoeral  Upbeating R torsional persisntinied nystmagus but fliingn nyamgus  mild reverual upon istting   Sidelying Test     [facility] Supine Roll Test     [facility] Forward Roll Test     Stratton and Lean Test -  Sitting Erect    Stratton and Lean Test - Seated, Head Bent 60 Degrees Forward    Stratton and Lean Test - Seated, Head Bent Backwards       BPPV Canal(s): R Posterior  BPPV Type: Cupulolithasis    Dynamic Visual Acuity (DVA)    Static  Acuity (LogMar)    Horizontal Head Movement at 1 Hz (LogMar)    Horizontal Head Movement at 2 Hz (LogMar)           Assessment & Plan   CLINICAL IMPRESSIONS  Medical Diagnosis:      Treatment Diagnosis:     Impression/Assessment: Patient is a 44 year old male with dizziness and balance limitations.  The following significant findings have been identified: Impaired balance, Impaired gait, and Dizziness. These impairments interfere with their ability to perform work tasks, recreational activities, household chores, driving , household mobility, and community mobility as compared to previous level of function.     Clinical Decision Making (Complexity):  Clinical Presentation: Evolving/Changing  Clinical Presentation Rationale: based on medical and personal factors listed in PT evaluation  Clinical Decision Making (Complexity): Low complexity    PLAN OF CARE  Treatment Interventions:  Interventions: Gait Training, Neuromuscular Re-education, Therapeutic Activity, Therapeutic Exercise, Self-Care/Home Management, Canalith Repositioning    Long Term Goals            Frequency of Treatment:    Duration of Treatment:      Recommended Referrals to Other Professionals:   Education Assessment:        Risks and benefits of evaluation/treatment have been explained.   Patient/Family/caregiver agrees with Plan of Care.     Evaluation Time:             Signing Clinician: Terrie Jiménez, PT

## 2025-01-09 ENCOUNTER — TELEPHONE (OUTPATIENT)
Dept: NEUROLOGY | Facility: CLINIC | Age: 45
End: 2025-01-09
Payer: COMMERCIAL

## 2025-01-09 ASSESSMENT — HEADACHE IMPACT TEST (HIT 6)
HOW OFTEN HAVE YOU FELT TOO TIRED TO WORK BECAUSE OF YOUR HEADACHES: VERY OFTEN
HOW OFTEN DO HEADACHES LIMIT YOUR DAILY ACTIVITIES: VERY OFTEN
HOW OFTEN HAVE YOU FELT FED UP OR IRRITATED BECAUSE OF YOUR HEADACHES: ALWAYS
WHEN YOU HAVE A HEADACHE HOW OFTEN DO YOU WISH YOU COULD LIE DOWN: VERY OFTEN
WHEN YOU HAVE HEADACHES HOW OFTEN IS THE PAIN SEVERE: VERY OFTEN
HOW OFTEN DID HEADACHS LIMIT CONCENTRATION ON WORK OR DAILY ACTIVITY: VERY OFTEN
HIT6 TOTAL SCORE: 68

## 2025-01-13 ASSESSMENT — MIGRAINE DISABILITY ASSESSMENT (MIDAS)
HOW MANY DAYS DID YOU NOT DO HOUSEWORK BECAUSE OF HEADACHES: 21
ON A SCALE FROM 0-10 ON AVERAGE HOW PAINFUL WERE HEADACHES: 9
HOW MANY DAYS DID YOU MISS WORK OR SCHOOL BECAUSE OF HEADACHES: 6
TOTAL SCORE: 65
HOW MANY DAYS WAS HOUSEWORK PRODUCTIVITY CUT IN HALF DUE TO HEADACHES: 24
HOW OFTEN WERE SOCIAL ACTIVITIES MISSED DUE TO HEADACHES: 7
HOW MANY DAYS WAS YOUR PRODUCTIVITY CUT IN HALF BECAUSE OF HEADACHES: 7
HOW MANY DAYS IN THE PAST 3 MONTHS HAVE YOU HAD A HEADACHE: 48

## 2025-01-13 ASSESSMENT — PATIENT HEALTH QUESTIONNAIRE - PHQ9
SUM OF ALL RESPONSES TO PHQ QUESTIONS 1-9: 9
SUM OF ALL RESPONSES TO PHQ QUESTIONS 1-9: 9
10. IF YOU CHECKED OFF ANY PROBLEMS, HOW DIFFICULT HAVE THESE PROBLEMS MADE IT FOR YOU TO DO YOUR WORK, TAKE CARE OF THINGS AT HOME, OR GET ALONG WITH OTHER PEOPLE: VERY DIFFICULT

## 2025-01-14 ENCOUNTER — VIRTUAL VISIT (OUTPATIENT)
Dept: NEUROLOGY | Facility: CLINIC | Age: 45
End: 2025-01-14
Payer: COMMERCIAL

## 2025-01-14 VITALS — WEIGHT: 179 LBS | HEIGHT: 68 IN | BODY MASS INDEX: 27.13 KG/M2

## 2025-01-14 DIAGNOSIS — G43.009 MIGRAINE WITHOUT AURA AND WITHOUT STATUS MIGRAINOSUS, NOT INTRACTABLE: ICD-10-CM

## 2025-01-14 DIAGNOSIS — G43.809 VESTIBULAR MIGRAINE: Primary | ICD-10-CM

## 2025-01-14 DIAGNOSIS — R42 VERTIGO: ICD-10-CM

## 2025-01-14 DIAGNOSIS — H81.10 BPPV (BENIGN PAROXYSMAL POSITIONAL VERTIGO), UNSPECIFIED LATERALITY: ICD-10-CM

## 2025-01-14 RX ORDER — RIZATRIPTAN BENZOATE 10 MG/1
10 TABLET ORAL
Qty: 18 TABLET | Refills: 5 | Status: SHIPPED | OUTPATIENT
Start: 2025-01-14

## 2025-01-14 RX ORDER — AMITRIPTYLINE HYDROCHLORIDE 10 MG/1
40 TABLET ORAL AT BEDTIME
COMMUNITY
Start: 2023-09-12

## 2025-01-14 RX ORDER — MECLIZINE HYDROCHLORIDE 25 MG/1
25 TABLET ORAL EVERY 6 HOURS PRN
Qty: 90 TABLET | Refills: 5 | Status: SHIPPED | OUTPATIENT
Start: 2025-01-14

## 2025-01-14 ASSESSMENT — PAIN SCALES - GENERAL: PAINLEVEL_OUTOF10: MILD PAIN (3)

## 2025-01-14 NOTE — NURSING NOTE
Current patient location: [address] [place] [id number]    Is the patient currently in the state of [place]? YES    Visit mode: VIDEO    If the visit is dropped, the patient can be reconnected by:VIDEO VISIT: Text to cell phone:   Telephone Information:   Mobile [phone]       Will anyone else be joining the visit? NO  (If patient encounters technical issues they should call [phone]    Are changes needed to the allergy or medication list? No    Are refills needed on medications prescribed by this physician? Discuss with provider    Rooming Documentation:  Questionnaire(s) completed    Reason for visit: Follow Up    Joanne SHORT

## 2025-01-14 NOTE — PROGRESS NOTES
Virtual Visit Details    Type of service:  Video Visit     Originating Location (pt. Location): Home    Distant Location (provider location):  On-site  Platform used for Video Visit: [facility]    Headache Neurology Progress Note    January 14, 2025    Assessment and Plan:   Alfredo is a 44 year old male who presents for follow up of possible migraine and vertigo, possible vestibular migraine.     We discussed the following treatment strategy:  - For acute treatment of migraine, he may use rizatriptan 10 mg taken at onset of migraine, with a repeat dose taken after 2 hours if needed.  You should not exceed more than 9 days/month.  He prefers this over sumatriptan hand.  -Alternatively, will trial Nurtec 75 mg ODT taken as needed for migraine.  Side effects reviewed.  Will work to obtain prior authorization for this medication.  - For acute treatment of vertigo, he may continue to use meclizine as needed.     His current symptom frequency and severity warrant prevention.  -He tried topiramate 25 mg nightly for 1 to 2 weeks but was unable to tolerate a longer trial as this was worsening daytime dizziness and unsteadiness to the point where he was unable to drive and was needing to miss work very regularly.  He has since discontinued topiramate.  -He continues with amitriptyline 40 mg nightly.  -I recommend a trial of Emgality 240 mg administered subcutaneously as an initial loading dose, followed by 120 mg subcutaneous injection administered every 28 days thereafter.  We will work to obtain prior authorization for this medication.  Side effects reviewed.  Recommended treatment trial of 3 to 6 months prior to determining effectiveness.  - He currently takes carvedilol so we will avoid additional antihypertensive agents.  - I recommend he continue with vestibular therapy.  -Could consider botulinum toxin injections, switching amitriptyline to venlafaxine.     The longitudinal plan  of care for the diagnosis(es)/condition(s) as documented were addressed during this visit. Due to the added complexity in care, I will continue to support Alfredo in the subsequent management and with ongoing continuity of care.     Follow up in 2-3 months.     Veronica Motta PA-C  Headache Neurology  [facility]      Subjective:    Alfredo presents for follow up of possible vestibular migraine.     See initial encounter 12/11/2024 for detailed description of symptoms.     Tried topiramate 25 mg for 1-2 weeks but noted increased dizziness on this. Was waking up feeling more unsteady or wobbly once he started topiramate. He was needing to miss work as he was unable to drive a vehicle. He has since discontinued topiramate.     Sumatriptan 100 mg knocked him out when he tried this. Similar relief to rizatriptan but he tolerated rizatriptan better.     He has restarted vestibular PT. Did initial evaluation and one therapy session.     Meclizine is helpful as needed.  Still taking amitriptyline 40 mg nightly.    Recalls a period of 7/10 migraine days and 6/10 dizzy days within the past month.          12/5/2024    11:08 AM 1/9/2025     9:21 PM   HIT-6   When you have headaches, how often is the pain severe 11 11   How often do headaches limit your ability to do usual daily activities including household work, work, school, or social activities? 11 11   When you have a headache, how often do you wish you could lie down? 11 11   In the past 4 weeks, how often have you felt too tired to do work or daily activities because of your headaches 11 11   In the past 4 weeks, how often have you felt fed up or irritated because of your headaches 13 13   In the past 4 weeks, how often did headaches limit your ability to concentrate on work or daily activities 11 11   HIT-6 Total Score 68  68        Patient-reported           12/5/2024    11:18 AM 1/13/2025     6:20 PM   MIDAS - in the past three months:   On how many  "days did you miss work or school because of your headaches? 4 6   How many days was your productivity at work or school reduced by half or more because of your headaches? 5 7   On how many days did you not do household work because of your headaches? 12 21   How many days was your productivity in household work reduced by half or more because of your headaches? 24 24   On how many days did you miss family, social, or leisure activities because of your headaches? 6 7   On how many days did you have a headache? 36 48   On a scale of 0-10, on average how painful were these headaches? 8 9   MIDAS Score 51 (IV - Severe Disability) 65 (IV - Severe Disability)        Objective:    Vitals: Ht 1.715 m (5' 7.52\")   Wt 81.2 kg (179 lb)   BMI 27.61 kg/m    General: Cooperative, NAD  Neurologic Exam:  Mental Status: Fully alert, attentive and oriented. Speech is clear and fluent.   Cranial Nerves: Facial movements symmetric.   Motor: No abnormal movements.          "

## 2025-01-14 NOTE — LETTER
1/14/2025      Alfredo Brewer  [address] [address] [facility] [address]      Dear Colleague,    Thank you for referring your patient, Alfredo Brewer, to the [facility] NEUROLOGY CLINICS [facility]. Please see a copy of my visit note below.    Virtual Visit Details    Type of service:  Video Visit     Originating Location (pt. Location): Home    Distant Location (provider location):  On-site  Platform used for Video Visit: [facility]    Headache Neurology Progress Note    January 14, 2025    Assessment and Plan:   Alfredo is a 44 year old male who presents for follow up of possible migraine and vertigo, possible vestibular migraine.     We discussed the following treatment strategy:  - For acute treatment of migraine, he may use rizatriptan 10 mg taken at onset of migraine, with a repeat dose taken after 2 hours if needed.  You should not exceed more than 9 days/month.  He prefers this over sumatriptan hand.  -Alternatively, will trial Nurtec 75 mg ODT taken as needed for migraine.  Side effects reviewed.  Will work to obtain prior authorization for this medication.  - For acute treatment of vertigo, he may continue to use meclizine as needed.     His current symptom frequency and severity warrant prevention.  -He tried topiramate 25 mg nightly for 1 to 2 weeks but was unable to tolerate a longer trial as this was worsening daytime dizziness and unsteadiness to the point where he was unable to drive and was needing to miss work very regularly.  He has since discontinued topiramate.  -He continues with amitriptyline 40 mg nightly.  -I recommend a trial of Emgality 240 mg administered subcutaneously as an initial loading dose, followed by 120 mg subcutaneous injection administered every 28 days thereafter.  We will work to obtain prior authorization for this medication.  Side effects reviewed.  Recommended treatment trial of 3 to 6 months prior to determining effectiveness.  -  He currently takes carvedilol so we will avoid additional antihypertensive agents.  - I recommend he continue with vestibular therapy.  -Could consider botulinum toxin injections, switching amitriptyline to venlafaxine.     The longitudinal plan of care for the diagnosis(es)/condition(s) as documented were addressed during this visit. Due to the added complexity in care, I will continue to support Alfredo in the subsequent management and with ongoing continuity of care.     Follow up in 2-3 months.     Veronica Motta PA-C  Headache Neurology  [facility]      Subjective:    Alfredo presents for follow up of possible vestibular migraine.     See initial encounter 12/11/2024 for detailed description of symptoms.     Tried topiramate 25 mg for 1-2 weeks but noted increased dizziness on this. Was waking up feeling more unsteady or wobbly once he started topiramate. He was needing to miss work as he was unable to drive a vehicle. He has since discontinued topiramate.     Sumatriptan 100 mg knocked him out when he tried this. Similar relief to rizatriptan but he tolerated rizatriptan better.     He has restarted vestibular PT. Did initial evaluation and one therapy session.     Meclizine is helpful as needed.  Still taking amitriptyline 40 mg nightly.    Recalls a period of 7/10 migraine days and 6/10 dizzy days within the past month.          12/5/2024    11:08 AM 1/9/2025     9:21 PM   HIT-6   When you have headaches, how often is the pain severe 11 11   How often do headaches limit your ability to do usual daily activities including household work, work, school, or social activities? 11 11   When you have a headache, how often do you wish you could lie down? 11 11   In the past 4 weeks, how often have you felt too tired to do work or daily activities because of your headaches 11 11   In the past 4 weeks, how often have you felt fed up or irritated because of your headaches 13 13   In the past 4 weeks,  "how often did headaches limit your ability to concentrate on work or daily activities 11 11   HIT-6 Total Score 68  68        Patient-reported           12/5/2024    11:18 AM 1/13/2025     6:20 PM   MIDAS - in the past three months:   On how many days did you miss work or school because of your headaches? 4 6   How many days was your productivity at work or school reduced by half or more because of your headaches? 5 7   On how many days did you not do household work because of your headaches? 12 21   How many days was your productivity in household work reduced by half or more because of your headaches? 24 24   On how many days did you miss family, social, or leisure activities because of your headaches? 6 7   On how many days did you have a headache? 36 48   On a scale of 0-10, on average how painful were these headaches? 8 9   MIDAS Score 51 (IV - Severe Disability) 65 (IV - Severe Disability)        Objective:    Vitals: Ht 1.715 m (5' 7.52\")   Wt 81.2 kg (179 lb)   BMI 27.61 kg/m    General: Cooperative, NAD  Neurologic Exam:  Mental Status: Fully alert, attentive and oriented. Speech is clear and fluent.   Cranial Nerves: Facial movements symmetric.   Motor: No abnormal movements.            Again, thank you for allowing me to participate in the care of your patient.        Sincerely,        VEE HOPKINS PA-C    Electronically signed"

## 2025-02-10 ASSESSMENT — HEADACHE IMPACT TEST (HIT 6)
HOW OFTEN HAVE YOU FELT FED UP OR IRRITATED BECAUSE OF YOUR HEADACHES: SOMETIMES
WHEN YOU HAVE A HEADACHE HOW OFTEN DO YOU WISH YOU COULD LIE DOWN: VERY OFTEN
HOW OFTEN DID HEADACHS LIMIT CONCENTRATION ON WORK OR DAILY ACTIVITY: SOMETIMES
HOW OFTEN DO HEADACHES LIMIT YOUR DAILY ACTIVITIES: VERY OFTEN
HIT6 TOTAL SCORE: 63
WHEN YOU HAVE HEADACHES HOW OFTEN IS THE PAIN SEVERE: VERY OFTEN
HOW OFTEN HAVE YOU FELT TOO TIRED TO WORK BECAUSE OF YOUR HEADACHES: SOMETIMES

## 2025-02-10 ASSESSMENT — MIGRAINE DISABILITY ASSESSMENT (MIDAS)
HOW MANY DAYS DID YOU MISS WORK OR SCHOOL BECAUSE OF HEADACHES: 6
TOTAL SCORE: 65
HOW MANY DAYS DID YOU NOT DO HOUSEWORK BECAUSE OF HEADACHES: 21
HOW MANY DAYS IN THE PAST 3 MONTHS HAVE YOU HAD A HEADACHE: 48
HOW MANY DAYS WAS YOUR PRODUCTIVITY CUT IN HALF BECAUSE OF HEADACHES: 7
HOW OFTEN WERE SOCIAL ACTIVITIES MISSED DUE TO HEADACHES: 7
HOW MANY DAYS WAS HOUSEWORK PRODUCTIVITY CUT IN HALF DUE TO HEADACHES: 24
ON A SCALE FROM 0-10 ON AVERAGE HOW PAINFUL WERE HEADACHES: 9

## 2025-02-11 ENCOUNTER — VIRTUAL VISIT (OUTPATIENT)
Dept: NEUROLOGY | Facility: CLINIC | Age: 45
End: 2025-02-11
Payer: COMMERCIAL

## 2025-02-11 DIAGNOSIS — E03.8 CENTRAL HYPOTHYROIDISM: ICD-10-CM

## 2025-02-11 DIAGNOSIS — G43.009 MIGRAINE WITHOUT AURA AND WITHOUT STATUS MIGRAINOSUS, NOT INTRACTABLE: ICD-10-CM

## 2025-02-11 DIAGNOSIS — G43.809 VESTIBULAR MIGRAINE: Primary | ICD-10-CM

## 2025-02-11 DIAGNOSIS — R42 VERTIGO: ICD-10-CM

## 2025-02-11 DIAGNOSIS — H81.10 BPPV (BENIGN PAROXYSMAL POSITIONAL VERTIGO), UNSPECIFIED LATERALITY: ICD-10-CM

## 2025-02-11 RX ORDER — LEVOTHYROXINE SODIUM 75 UG/1
75 TABLET ORAL DAILY
Qty: 90 TABLET | Refills: 0 | Status: SHIPPED | OUTPATIENT
Start: 2025-02-11

## 2025-02-11 NOTE — LETTER
2/11/2025      Alfredo Brewer  [address] [address] [address]      Dear Colleague,    Thank you for referring your patient, Alfredo Brewer, to the [facility] NEUROLOGY CLINICS [facility]. Please see a copy of my visit note below.    [facility]    Headache Neurology Progress Note    February 11, 2025    Assessment and Plan:   Alfredo is a 45 year old male who presents for follow up of migraine and vertigo, possible vestibular migraine.     We discussed the following treatment strategy:  - For acute treatment of migraine, he may use rizatriptan 10 mg taken at onset of migraine, with a repeat dose taken after 2 hours if needed.  Use should not exceed more than 9 days/month.  He prefers this over sumatriptan.  -Alternatively, can trial Nurtec 75 mg ODT taken as needed for migraine.  Side effects reviewed.   - For acute treatment of vertigo, he may continue to use meclizine as needed.     His current symptom frequency and severity warrant prevention.  -He continues with amitriptyline 40 mg nightly.  -Will hold off on starting Emgality due to cost and improvement in symptoms.   - He currently takes carvedilol so we will avoid additional antihypertensive agents. Previously tried and failed topiramate.  - I recommend he continue with vestibular therapy.  -Could consider botulinum toxin injections, switching amitriptyline to venlafaxine.     The longitudinal plan of care for the diagnosis(es)/condition(s) as documented were addressed during this visit. Due to the added complexity in care, I will continue to support Alfredo in the subsequent management and with ongoing continuity of care.     Follow up in 1-2 months.    I spent 20 minutes on the date of encounter of which 12 minutes were spent in medical discussion on the telephone with patient.    Veronica Motta PA-C  Headache Neurology  [facility] Neurology [facility]      Subjective:    Alfredo presents for follow up of possible  "vestibular migraine.     Headache description from initial consult:   \"He reports he has been experiencing vertigo since 2011, and migraines since 2007.  Regarding his vertigo, he describes a sensation where the room seems like its being jostled up and down, as if he were a bobblehead though his head is staying still. This occurs 3-4 times per week lasting about 6-24 hours with waxing and waning severity.  He can get more intense bouts of vertigo where everything is spinning and these bouts can last 3-4 days in duration.   He gets nausea, but not too frequently. This fall, he had a severe bout of vertigo with nausea leading to vomiting.   He also has a history of migraine headaches. Migraines will start in the frontal and bitemporal regions, though will focus behind his eye. They are a sharp pain lasting up to 2 days. He rarely has nausea with migraine. He thinks he gets a migraine about 30-50% of the time associated with his bobblehead-effect vertigo, though he can occasionally get migraines without dizziness.   He can have photophobia, phonophobia with bouts of vertigo, as well as with his migraines.\"    At last visit, sumatriptan was not tolerated so this was switched back to rizatriptan, and Emgality was recommended.    He reports things have been pretty good over the past month.   He has had 2 mornings of \"head bobble\" feeling lasting no more than 2 hours and was very mild. He did not need to take meclizine. He has not had any migraines in the last month.    He went to  Emg[facility] but out of pocket cost was too high so he did not start this.  Picked up Nurtec but has not needed to use this.    Unfortunately had COVID almost right after our last visit. Recovered well. No exacerbation of symptoms from COVID.     Has not had vestibular PT in a while due to illness/scheduling conflicts. Initial eval had some features of BPPV. Had Epley done at last session which exacerbated symptoms initially but now he has " had good control of symptoms.     Current headache treatments:  Acute therapies:  - Rizatriptan 10 mg  - Nurtec 75 mg ODT (has not yet tried)  - Meclizine - helpful for vertigo    Preventative therapies:  - Amitriptyline 40 mg  - Carvedilol    Supportive therapies:  - Vestibular PT     Previous treatments tried:  Acute therapies:  - Sumatriptan 100 mg - too sedating     Preventative therapies:  - Topiramate 25 mg - Tried for 1-2 weeks but had significant cognitive effects, unsteady, unable to drive   - Emgality 120 mg - too costly, did not start    Supportive therapies:        12/5/2024    11:08 AM 1/9/2025     9:21 PM 2/10/2025    10:03 PM   HIT-6   When you have headaches, how often is the pain severe 11 11 11   How often do headaches limit your ability to do usual daily activities including household work, work, school, or social activities? 11 11 11   When you have a headache, how often do you wish you could lie down? 11 11 11   In the past 4 weeks, how often have you felt too tired to do work or daily activities because of your headaches 11 11 10   In the past 4 weeks, how often have you felt fed up or irritated because of your headaches 13 13 10   In the past 4 weeks, how often did headaches limit your ability to concentrate on work or daily activities 11 11 10   HIT-6 Total Score 68  68  63        Patient-reported           12/5/2024    11:18 AM 1/13/2025     6:20 PM 2/10/2025    10:04 PM   MIDAS - in the past three months:   On how many days did you miss work or school because of your headaches? 4 6 6   How many days was your productivity at work or school reduced by half or more because of your headaches? 5 7 7   On how many days did you not do household work because of your headaches? 12 21 21   How many days was your productivity in household work reduced by half or more because of your headaches? 24 24 24   On how many days did you miss family, social, or leisure activities because of your headaches? 6 7  7   On how many days did you have a headache? 36 48 48   On a scale of 0-10, on average how painful were these headaches? 8 9 9   MIDAS Score 51 (IV - Severe Disability) 65 (IV - Severe Disability) 65 (IV - Severe Disability)        Objective:    Vitals: There were no vitals taken for this visit.  General: Cooperative, NAD  Neurologic Exam:  Mental Status: Fully alert, attentive and oriented. Speech is clear and fluent.   Cranial Nerves: Facial movements symmetric.   Motor: No abnormal movements.            Virtual Visit Details    Type of service:  Video Visit    Originating Location (pt. Location): Home    Distant Location (provider location):  Off-site  Platform used for Video Visit: [employer]    Provider unable to see patient on [employer] and patient was unable to hear provider on [employer] so visit was completed as telephone visit today.      Again, thank you for allowing me to participate in the care of your patient.        Sincerely,        VEE HOPKINS PA-C    Electronically signed

## 2025-02-11 NOTE — PROGRESS NOTES
"[facility]    Headache Neurology Progress Note    February 11, 2025    Assessment and Plan:   Alfredo is a 45 year old male who presents for follow up of migraine and vertigo, possible vestibular migraine.     We discussed the following treatment strategy:  - For acute treatment of migraine, he may use rizatriptan 10 mg taken at onset of migraine, with a repeat dose taken after 2 hours if needed.  Use should not exceed more than 9 days/month.  He prefers this over sumatriptan.  -Alternatively, can trial Nurtec 75 mg ODT taken as needed for migraine.  Side effects reviewed.   - For acute treatment of vertigo, he may continue to use meclizine as needed.     His current symptom frequency and severity warrant prevention.  -He continues with amitriptyline 40 mg nightly.  -Will hold off on starting Emgality due to cost and improvement in symptoms.   - He currently takes carvedilol so we will avoid additional antihypertensive agents. Previously tried and failed topiramate.  - I recommend he continue with vestibular therapy.  -Could consider botulinum toxin injections, switching amitriptyline to venlafaxine.     The longitudinal plan of care for the diagnosis(es)/condition(s) as documented were addressed during this visit. Due to the added complexity in care, I will continue to support Alfredo in the subsequent management and with ongoing continuity of care.     Follow up in 1-2 months.    I spent 20 minutes on the date of encounter of which 12 minutes were spent in medical discussion on the telephone with patient.    Veronica Motta PA-C  Headache Neurology  [facility] Neurology [facility]      Subjective:    Alfredo presents for follow up of possible vestibular migraine.     Headache description from initial consult:   \"He reports he has been experiencing vertigo since 2011, and migraines since 2007.  Regarding his vertigo, he describes a sensation where the room seems like its being jostled up and " "down, as if he were a bobblehead though his head is staying still. This occurs 3-4 times per week lasting about 6-24 hours with waxing and waning severity.  He can get more intense bouts of vertigo where everything is spinning and these bouts can last 3-4 days in duration.   He gets nausea, but not too frequently. This fall, he had a severe bout of vertigo with nausea leading to vomiting.   He also has a history of migraine headaches. Migraines will start in the frontal and bitemporal regions, though will focus behind his eye. They are a sharp pain lasting up to 2 days. He rarely has nausea with migraine. He thinks he gets a migraine about 30-50% of the time associated with his bobblehead-effect vertigo, though he can occasionally get migraines without dizziness.   He can have photophobia, phonophobia with bouts of vertigo, as well as with his migraines.\"    At last visit, sumatriptan was not tolerated so this was switched back to rizatriptan, and Emgality was recommended.    He reports things have been pretty good over the past month.   He has had 2 mornings of \"head bobble\" feeling lasting no more than 2 hours and was very mild. He did not need to take meclizine. He has not had any migraines in the last month.    He went to  [facility] but out of pocket cost was too high so he did not start this.  Picked up Nurtec but has not needed to use this.    Unfortunately had COVID almost right after our last visit. Recovered well. No exacerbation of symptoms from COVID.     Has not had vestibular PT in a while due to illness/scheduling conflicts. Initial eval had some features of BPPV. Had Epley done at last session which exacerbated symptoms initially but now he has had good control of symptoms.     Current headache treatments:  Acute therapies:  - Rizatriptan 10 mg  - Nurtec 75 mg ODT (has not yet tried)  - Meclizine - helpful for vertigo    Preventative therapies:  - Amitriptyline 40 mg  - Carvedilol    Supportive " therapies:  - Vestibular PT     Previous treatments tried:  Acute therapies:  - Sumatriptan 100 mg - too sedating     Preventative therapies:  - Topiramate 25 mg - Tried for 1-2 weeks but had significant cognitive effects, unsteady, unable to drive   - Emgality 120 mg - too costly, did not start    Supportive therapies:        12/5/2024    11:08 AM 1/9/2025     9:21 PM 2/10/2025    10:03 PM   HIT-6   When you have headaches, how often is the pain severe 11 11 11   How often do headaches limit your ability to do usual daily activities including household work, work, school, or social activities? 11 11 11   When you have a headache, how often do you wish you could lie down? 11 11 11   In the past 4 weeks, how often have you felt too tired to do work or daily activities because of your headaches 11 11 10   In the past 4 weeks, how often have you felt fed up or irritated because of your headaches 13 13 10   In the past 4 weeks, how often did headaches limit your ability to concentrate on work or daily activities 11 11 10   HIT-6 Total Score 68  68  63        Patient-reported           12/5/2024    11:18 AM 1/13/2025     6:20 PM 2/10/2025    10:04 PM   MIDAS - in the past three months:   On how many days did you miss work or school because of your headaches? 4 6 6   How many days was your productivity at work or school reduced by half or more because of your headaches? 5 7 7   On how many days did you not do household work because of your headaches? 12 21 21   How many days was your productivity in household work reduced by half or more because of your headaches? 24 24 24   On how many days did you miss family, social, or leisure activities because of your headaches? 6 7 7   On how many days did you have a headache? 36 48 48   On a scale of 0-10, on average how painful were these headaches? 8 9 9   MIDAS Score 51 (IV - Severe Disability) 65 (IV - Severe Disability) 65 (IV - Severe Disability)        Objective:    Vitals:  There were no vitals taken for this visit.  General: Cooperative, NAD  Neurologic Exam:  Mental Status: Fully alert, attentive and oriented. Speech is clear and fluent.   Cranial Nerves: Facial movements symmetric.   Motor: No abnormal movements.

## 2025-02-11 NOTE — PROGRESS NOTES
Virtual Visit Details    Type of service:  Video Visit    Originating Location (pt. Location): Home    Distant Location (provider location):  Off-site  Platform used for Video Visit: [employer]    Provider unable to see patient on [employer] and patient was unable to hear provider on [employer] so visit was completed as telephone visit today.

## 2025-02-11 NOTE — TELEPHONE ENCOUNTER
Last Written Prescription Date:  11/12/24  Last Fill Quantity: 90,  # refills: 0   Last office visit: Visit date not found ; last virtual visit: 10/10/2023 with prescribing provider:  Dr. Vega   Future Office Visit: 4/1/25     Requested Prescriptions   Pending Prescriptions Disp Refills    levothyroxine (SYNTHROID/LEVOTHROID) 75 MCG tablet [Pharmacy Med Name: LEVOTHYROXINE 75 MCG TABLET] 90 tablet 0     Sig: TAKE 1 TABLET BY MOUTH EVERY DAY       Thyroid Protocol Failed - 2/11/2025 12:27 PM        Failed - Recent (12 mo) or future (90 days) visit within the authorizing provider's specialty     The patient must have completed an in-person or virtual visit within the past 12 months or has a future visit scheduled within the next 90 days with the authorizing provider s specialty.  Urgent care and e-visits do not qualify as an office visit for this protocol.          Failed - Normal TSH on file in past 12 months     Recent Labs   Lab Test 01/19/23  0802   TSH 0.07*              Passed - Patient is 12 years or older        Passed - Medication is active on med list        Passed - Medication indicated for associated diagnosis     Medication is associated with one or more of the following diagnoses:  Hypothyroidism  Thyroid stimulating hormone suppression therapy  Thyroid cancer  Acquired atrophy of thyroid             Refill sent- pt has appt scheduled for 4/1/25  Clarita Salazar RN

## 2025-02-11 NOTE — NURSING NOTE
Current patient location: [address] [place] [id number]    Is the patient currently in the state of [place]? YES    Visit mode: VIDEO    If the visit is dropped, the patient can be reconnected by:TELEPHONE VISIT: Phone number:   Telephone Information:   Mobile [phone]       Will anyone else be joining the visit? NO  (If patient encounters technical issues they should call [phone])    Are changes needed to the allergy or medication list? Pt stated no med changes    Are refills needed on medications prescribed by this physician? NO    Rooming Documentation:  Questionnaire(s) completed    Reason for visit: Headache    Divine ARMANDOF

## 2025-02-14 ENCOUNTER — THERAPY VISIT (OUTPATIENT)
Dept: PHYSICAL THERAPY | Facility: CLINIC | Age: 45
End: 2025-02-14
Payer: COMMERCIAL

## 2025-02-14 DIAGNOSIS — G43.109 MIGRAINE WITH TYPICAL AURA: Primary | ICD-10-CM

## 2025-02-14 DIAGNOSIS — G43.809 VESTIBULAR MIGRAINE: ICD-10-CM

## 2025-02-14 PROCEDURE — 95992 CANALITH REPOSITIONING PROC: CPT | Mod: GP

## 2025-02-14 PROCEDURE — 97112 NEUROMUSCULAR REEDUCATION: CPT | Mod: GP,59

## 2025-02-19 DIAGNOSIS — Z85.6 HISTORY OF MYELOID LEUKEMIA: ICD-10-CM

## 2025-02-19 DIAGNOSIS — C92.01 AML (ACUTE MYELOID LEUKEMIA) IN REMISSION (H): ICD-10-CM

## 2025-02-19 DIAGNOSIS — Z92.21 STATUS POST CHEMOTHERAPY: ICD-10-CM

## 2025-02-19 DIAGNOSIS — R00.0 SINUS TACHYCARDIA: ICD-10-CM

## 2025-02-19 DIAGNOSIS — E78.00 HYPERCHOLESTEREMIA: ICD-10-CM

## 2025-02-19 RX ORDER — CARVEDILOL 6.25 MG/1
9.38 TABLET ORAL 2 TIMES DAILY WITH MEALS
Qty: 270 TABLET | Refills: 3 | Status: SHIPPED | OUTPATIENT
Start: 2025-02-19

## 2025-02-19 RX ORDER — ROSUVASTATIN CALCIUM 40 MG/1
40 TABLET, COATED ORAL DAILY
Qty: 90 TABLET | Refills: 3 | Status: SHIPPED | OUTPATIENT
Start: 2025-02-19

## 2025-02-27 ENCOUNTER — TELEPHONE (OUTPATIENT)
Dept: NEUROLOGY | Facility: CLINIC | Age: 45
End: 2025-02-27
Payer: COMMERCIAL

## 2025-02-27 NOTE — TELEPHONE ENCOUNTER
Attempted to call pt. LDVMCB    Let him know that typically, pt's need to talk with their human resources to get an intermittent leave of absence form that provider can complete.      Advised to call back if he would like to discuss further.     SIL Alcantara, BSN  [facility]

## 2025-02-27 NOTE — TELEPHONE ENCOUNTER
Spoke with patient.     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     They sent him an email today with concerns about continued missed work.     Pt has had migraine and vertigo since Monday so has been unable to drive or work.      He plans on getting on remotely today at 1 PM.      Advised pt that we can write work excuse letter for Monday through Thursday morning.  But also advised he talk with human resources to see if they have more formal forms/process for intermittent medical leave.      He verbalized understanding and will talk to them tomorrow or Monday.      Letter from provider can be sent via [employer].     SIL Alcantara, BSN  [facility]

## 2025-02-27 NOTE — TELEPHONE ENCOUNTER
Provider sent pt letter through [employer].     Maricruz RN, BSN  [facility]

## 2025-02-27 NOTE — TELEPHONE ENCOUNTER
Called patient to follow up on questions.   Patient states that he gave letter to employer. Patient states that now he has missed 3 to 4 days of work due to migraines.     Patient states that his job is giving him issues with letter, and is wondering if he can complete something additional along with the letter to protect his employment. His employer is giving patient friction with letter and patient wants to know if provider has suggestions or if there is anything else he can do additional to the letter that would assist him in securing his job while dealing with migraines.     Advised patient that I will send to staff and someone from clinic will follow up with patient.     Patient understands, and thanks staff for all the assistance and help he has received.      Keshia Gonzalez MA

## 2025-02-27 NOTE — LETTER
February 27, 2025      Alfredo Brewer  [address] [address] [facility] [address]        To Whom It May Concern:    Alfredo Brewer is under my care and I most recently saw him for evaluation on 2/11/2025. While his condition has improved since I initially met with him, he continues to experience episodic and unpredictable flare ups in symptoms which affect his ability to carry out routine daily activities. Please excuse him from work from Monday 2/24/25 through the morning of Thursday 2/27/2025 due to illness.       Sincerely,        Veronica Motta PA-C    Electronically signed

## 2025-02-27 NOTE — TELEPHONE ENCOUNTER
"[facility]    Phone Message    May a detailed message be left on voicemail: yes     Reason for Call: Other: Patient calling in. States that in December they were given a letter from  Veronica Motta PA-C to give to their employer in regards to their migraines. States that since then, they are running into some \"work related issues\" and would like a call back to discuss options and next steps. Patient disclosed what exact issues they were running into. Please give patient a call to [employer] at [phone].     Action Taken: Message routed to:  Other:  Neurology                                                                    "

## 2025-04-01 ENCOUNTER — VIRTUAL VISIT (OUTPATIENT)
Dept: ENDOCRINOLOGY | Facility: CLINIC | Age: 45
End: 2025-04-01
Payer: COMMERCIAL

## 2025-04-01 DIAGNOSIS — E23.7 PITUITARY ABNORMALITY: ICD-10-CM

## 2025-04-01 DIAGNOSIS — E03.8 CENTRAL HYPOTHYROIDISM: Primary | ICD-10-CM

## 2025-04-01 NOTE — LETTER
4/1/2025      Alfredo Brewer  [address] [address] [facility] [id number]      Dear Colleague,    Thank you for referring your patient, Alfredo Brewer, to the [facility] [place]. Please see a copy of my visit note below.      Video-Visit Details    Type of service:  Video Visit  Video Start Time: 1:02  Video End Time: 1:19  Originating Location (pt. Location): [place], [place]  Distant Location (provider location):  Home  Platform used for Video Visit: Delilah Vega MD      Alfredo Brewer is a 45 year old yo male who presents today via billable video visit for follow-up of hypopituitarism OF note, he has past medical history of childhood adult leukemia s/p chemo/XRT in remission since age 3, also vertigo, chronic headaches, HLD. Last seen by me 10/2023.  Chief Complaint   Patient presents with     Endocrine Problem     INTERVAL HISTORY:  - Overall functioning ok  - Migraines/vertigo worsening throughout 2024, but now doing physical therapy which is helping somewhat  - Eply maneuver in January  - Improvement in cold intolerance, improvement in constipation  - Energy ok, there is room for improvement  - Weight stable  - Does notice decreased motivation, stamina    Subjective:    1) Hypopituitarism  HPI in brief: At 6 mo, developed acute leukemia, s/p chemotherapy, brain radiation, and in remission since age 3. Now following with Dr Pimentel oncology for monitoring/maintenance.  Noted on MRI brain to have small range pituitary gland, came for evaluation. Of note, had normal puberty/growth.   Testing revealed low T4 (confirmed by T4 free equilibrium) and low testosterone. Other axes intact. See note from 5/2022 for full ROS/HPI      2) Central Hypothyroidism  Current regimen- Levothyroxine 75mcg   Previously Increased doses of levothyroxine up to 88mcg, but noted increase in vertigo/migraines on this dose, so reduced back to 75mcg. 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 Completed testing in 2019 (as below) with male fertility specialist at [facility] with reportedly normal sperm at that time with very similar testosterone levels to now      August 23, 2019 ([facility] testing, [facility])  Prolactin 5.2  Testosterone Total 304 (200-745)  Free Testosterone 9.1 (3.1-12.8)  Bioavailable Testo 214.0 (71.7-300.0)  SHBG 13 (16-70)  LH- 3  Estradiol- 13  FSH- 7.7    5/7/2019  Testosterone 172    6/23/2022  Testosterone 208  Free testosterone 6.42 (nl range)      Active diagnoses this visit:     Central hypothyroidism  Pituitary abnormality     ROS: 10 point ROS neg other than the symptoms noted above in the HPI.      Medical, surgical, social, and family histories, medications and allergies reviewed and updated.    Objective:  Physical Exam (visual exam)  VS:  no vital signs taken for video visit  CONSTITUTIONAL: healthy, alert and NAD, responding appropriately  ENT: normocephalic, no visual evidence of trauma, normal nose and oral mucosa  EYES: conjunctivae and sclerae normal, no exophthalmos or proptosis  THYROID:  no visualized nodules or goiter  LUNGS: no audible wheeze, cough or visible cyanosis, no visible retractions or increased work of breathing  EXTREMITIES: no hand tremors  NEUROLOGY: cranial nerves grossly intact with no obvious deficit.  SKIN:  no visualized skin lesions or rash, no edema visualized  PSYCH: mentation appears normal, normal judgement            Lab Results   Component Value Date/Time    TSH 0.07 (L) 01/19/2023 08:02 AM    TSH 0.12 (L) 10/04/2022 07:50 AM    TSH 1.93 06/03/2021 07:40 AM    T4 1.23 01/19/2023 08:02 AM    LH 3.2 05/24/2022 07:50 AM    FSH  7.7 05/24/2022 07:50 AM       Last Comprehensive Metabolic Panel:  Sodium   Date Value Ref Range Status   09/06/2024 137 135 - 145 mmol/L Final   06/03/2021 135 133 - 144 mmol/L Final     Potassium   Date Value Ref Range Status   09/06/2024 4.6 3.4 - 5.3 mmol/L Final   06/03/2021 4.1 3.4 - 5.3 mmol/L Final     Chloride   Date Value Ref Range Status   09/06/2024 101 98 - 107 mmol/L Final   06/03/2021 104 94 - 109 mmol/L Final     Carbon Dioxide   Date Value Ref Range Status   06/03/2021 28 20 - 32 mmol/L Final     Carbon Dioxide (CO2)   Date Value Ref Range Status   09/06/2024 26 22 - 29 mmol/L Final     Anion Gap   Date Value Ref Range Status   09/06/2024 10 7 - 15 mmol/L Final   06/03/2021 3 3 - 14 mmol/L Final     Glucose   Date Value Ref Range Status   09/06/2024 106 (H) 70 - 99 mg/dL Final   06/03/2021 91 70 - 99 mg/dL Final     Urea Nitrogen   Date Value Ref Range Status   09/06/2024 12.8 6.0 - 20.0 mg/dL Final   06/03/2021 21 7 - 30 mg/dL Final     Creatinine   Date Value Ref Range Status   09/06/2024 1.06 0.67 - 1.17 mg/dL Final   06/03/2021 1.12 0.66 - 1.25 mg/dL Final     GFR Estimate   Date Value Ref Range Status   09/06/2024 89 >60 mL/min/1.73m2 Final     Comment:     eGFR calculated using 2021 CKD-EPI equation.   06/03/2021 81 >60 mL/min/[1.73_m2] Final     Comment:     Non  GFR Calc  Starting 12/18/2018, serum creatinine based estimated GFR (eGFR) will be   calculated using the Chronic Kidney Disease Epidemiology Collaboration   (CKD-EPI) equation.       Calcium   Date Value Ref Range Status   09/06/2024 9.8 8.8 - 10.4 mg/dL Final     Comment:     Reference intervals for this test were updated on 7/16/2024 to reflect our healthy population more accurately. There may be differences in the flagging of prior results with similar values performed with this method. Those prior results can be interpreted in the context of the updated reference intervals.   06/03/2021 9.1 8.5 - 10.1 mg/dL Final      Bilirubin Total   Date Value Ref Range Status   09/06/2024 0.4 <=1.2 mg/dL Final   06/03/2021 0.4 0.2 - 1.3 mg/dL Final     Alkaline Phosphatase   Date Value Ref Range Status   09/06/2024 67 40 - 150 U/L Final   06/03/2021 71 40 - 150 U/L Final     ALT   Date Value Ref Range Status   09/06/2024 28 0 - 70 U/L Final   06/03/2021 34 0 - 70 U/L Final     AST   Date Value Ref Range Status   09/06/2024 27 0 - 45 U/L Final   06/03/2021 23 0 - 45 U/L Final     MRI Brain w/w/o contrast 2/9/2022:  FINDINGS: No abnormal intracranial restricted diffusion identified to  suggest acute infarct. The ventricles are normal in size and  configuration. A few scattered punctate nonspecific foci of T2 FLAIR  hyperintense signal are seen in the cerebral white matter (for  example, see series 4, images 17 and 21), not appearing significantly  changed. Although nonspecific, these can represent early sequela of  chronic small vessel ischemic change, and this extent would be  considered within normal limits for a patient of this age. Unchanged  borderline small size of the pituitary gland for age with pituitary  height of approximately 3.5 mm (series 7 image 13). Otherwise, the  morphology, volume, and signal intensity of the brain parenchyma are  within normal limits for age. The major arterial flow voids of the  skull base are grossly maintained. No evidence for intracranial  hemorrhage, extra-axial fluid collection, mass lesion, or abnormal  intracranial postcontrast enhancement.     The orbits appear within normal limits, accounting for limitations of  technique. The calvarium, skull base and mid face otherwise appear  grossly unremarkable.                                                                      IMPRESSION:  1. No acute intracranial process. No significant change compared to  brain MRI of 6/21/2019. Specifically, no intracranial hemorrhage, mass  lesion, infarct, or abnormal enhancement identified.  2. A few punctate  nonspecific foci of T2 FLAIR hyperintense signal  noted in the cerebral white matter.  3. Borderline small size of the pituitary gland for the patient's age.  This can be a normal variant, but also can be related to pituitary  atrophy or hypoplasia. Given the patient's history of whole brain  radiation, this could represent pituitary atrophy. Recommend  clinical/laboratory correlation for any evidence to suggest  hypothalamic/pituitary endocrine dysfunction.  4. Otherwise unremarkable brain MRI.        ASSESSMENT / PLAN:  (E23.7) Pituitary abnormality (H)      1) Pituitary Gland possible atrophy post brain XRT  2) Central Hypothyroidism  - Continue levothyroxine 75mcg overall improving, but recheck TFTs now and annually    3) Central hypogonadism  - Defer testosterone replacement given evaluation of other health concerns now, may consider in the upcoming months depending on where testosterone level is.  - If interested in fertility treatment, could consider HCG vs clomid, however would refer to [facility] for this (urology)        Orders Placed This Encounter   Procedures     T4 free     TSH     Testosterone total     Vitamin D Deficiency     Cortisol        Return to clinic 12 months    A total of 20 minutes were spent today 04/01/25 on this visit including chart review, history and counseling, documentation and other activities as detailed above.         Again, thank you for allowing me to participate in the care of your patient.        Sincerely,        Bettie Vega MD    Electronically signed

## 2025-04-01 NOTE — PROGRESS NOTES
Video-Visit Details    Type of service:  Video Visit  Video Start Time: 1:02  Video End Time: 1:19  Originating Location (pt. Location): [place], [place]  Distant Location (provider location):  Home  Platform used for Video Visit: Delilah Vega MD      Alfredo Brewer is a 45 year old yo male who presents today via billable video visit for follow-up of hypopituitarism OF note, he has past medical history of childhood adult leukemia s/p chemo/XRT in remission since age 3, also vertigo, chronic headaches, HLD. Last seen by me 10/2023.  Chief Complaint   Patient presents with    Endocrine Problem     INTERVAL HISTORY:  - Overall functioning ok  - Migraines/vertigo worsening throughout 2024, but now doing physical therapy which is helping somewhat  - Eply maneuver in January  - Improvement in cold intolerance, improvement in constipation  - Energy ok, there is room for improvement  - Weight stable  - Does notice decreased motivation, stamina    Subjective:    1) Hypopituitarism  HPI in brief: At 6 mo, developed acute leukemia, s/p chemotherapy, brain radiation, and in remission since age 3. Now following with Dr Pimentel oncology for monitoring/maintenance.  Noted on MRI brain to have small range pituitary gland, came for evaluation. Of note, had normal puberty/growth.   Testing revealed low T4 (confirmed by T4 free equilibrium) and low testosterone. Other axes intact. See note from 5/2022 for full ROS/HPI      2) Central Hypothyroidism  Current regimen- Levothyroxine 75mcg   Previously Increased doses of levothyroxine up to 88mcg, but noted increase in vertigo/migraines on this dose, so reduced back to 75mcg. 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 Completed testing in 2019 (as below) with male fertility specialist at [facility] with reportedly normal sperm at that time with very similar testosterone levels to now      August 23, 2019 ([facility] testing, [facility])  Prolactin 5.2  Testosterone Total 304 (200-745)  Free Testosterone 9.1 (3.1-12.8)  Bioavailable Testo 214.0 (71.7-300.0)  SHBG 13 (16-70)  LH- 3  Estradiol- 13  FSH- 7.7    5/7/2019  Testosterone 172    6/23/2022  Testosterone 208  Free testosterone 6.42 (nl range)      Active diagnoses this visit:     Central hypothyroidism  Pituitary abnormality     ROS: 10 point ROS neg other than the symptoms noted above in the HPI.      Medical, surgical, social, and family histories, medications and allergies reviewed and updated.    Objective:  Physical Exam (visual exam)  VS:  no vital signs taken for video visit  CONSTITUTIONAL: healthy, alert and NAD, responding appropriately  ENT: normocephalic, no visual evidence of trauma, normal nose and oral mucosa  EYES: conjunctivae and sclerae normal, no exophthalmos or proptosis  THYROID:  no visualized nodules or goiter  LUNGS: no audible wheeze, cough or visible cyanosis, no visible retractions or increased work of breathing  EXTREMITIES: no hand tremors  NEUROLOGY: cranial nerves grossly intact with no obvious deficit.  SKIN:  no visualized skin lesions or rash, no edema visualized  PSYCH: mentation appears normal, normal judgement            Lab Results   Component Value Date/Time    TSH 0.07 (L) 01/19/2023 08:02 AM    TSH 0.12 (L) 10/04/2022 07:50 AM    TSH 1.93 06/03/2021 07:40 AM    T4 1.23 01/19/2023 08:02 AM    LH 3.2 05/24/2022 07:50 AM    FSH 7.7 05/24/2022 07:50 AM       Last Comprehensive Metabolic Panel:  Sodium   Date Value Ref Range Status   09/06/2024 137 135 - 145 mmol/L Final   06/03/2021 135 133 - 144 mmol/L Final     Potassium   Date Value Ref Range Status   09/06/2024  4.6 3.4 - 5.3 mmol/L Final   06/03/2021 4.1 3.4 - 5.3 mmol/L Final     Chloride   Date Value Ref Range Status   09/06/2024 101 98 - 107 mmol/L Final   06/03/2021 104 94 - 109 mmol/L Final     Carbon Dioxide   Date Value Ref Range Status   06/03/2021 28 20 - 32 mmol/L Final     Carbon Dioxide (CO2)   Date Value Ref Range Status   09/06/2024 26 22 - 29 mmol/L Final     Anion Gap   Date Value Ref Range Status   09/06/2024 10 7 - 15 mmol/L Final   06/03/2021 3 3 - 14 mmol/L Final     Glucose   Date Value Ref Range Status   09/06/2024 106 (H) 70 - 99 mg/dL Final   06/03/2021 91 70 - 99 mg/dL Final     Urea Nitrogen   Date Value Ref Range Status   09/06/2024 12.8 6.0 - 20.0 mg/dL Final   06/03/2021 21 7 - 30 mg/dL Final     Creatinine   Date Value Ref Range Status   09/06/2024 1.06 0.67 - 1.17 mg/dL Final   06/03/2021 1.12 0.66 - 1.25 mg/dL Final     GFR Estimate   Date Value Ref Range Status   09/06/2024 89 >60 mL/min/1.73m2 Final     Comment:     eGFR calculated using 2021 CKD-EPI equation.   06/03/2021 81 >60 mL/min/[1.73_m2] Final     Comment:     Non  GFR Calc  Starting 12/18/2018, serum creatinine based estimated GFR (eGFR) will be   calculated using the Chronic Kidney Disease Epidemiology Collaboration   (CKD-EPI) equation.       Calcium   Date Value Ref Range Status   09/06/2024 9.8 8.8 - 10.4 mg/dL Final     Comment:     Reference intervals for this test were updated on 7/16/2024 to reflect our healthy population more accurately. There may be differences in the flagging of prior results with similar values performed with this method. Those prior results can be interpreted in the context of the updated reference intervals.   06/03/2021 9.1 8.5 - 10.1 mg/dL Final     Bilirubin Total   Date Value Ref Range Status   09/06/2024 0.4 <=1.2 mg/dL Final   06/03/2021 0.4 0.2 - 1.3 mg/dL Final     Alkaline Phosphatase   Date Value Ref Range Status   09/06/2024 67 40 - 150 U/L Final   06/03/2021 71 40 - 150  U/L Final     ALT   Date Value Ref Range Status   09/06/2024 28 0 - 70 U/L Final   06/03/2021 34 0 - 70 U/L Final     AST   Date Value Ref Range Status   09/06/2024 27 0 - 45 U/L Final   06/03/2021 23 0 - 45 U/L Final     MRI Brain w/w/o contrast 2/9/2022:  FINDINGS: No abnormal intracranial restricted diffusion identified to  suggest acute infarct. The ventricles are normal in size and  configuration. A few scattered punctate nonspecific foci of T2 FLAIR  hyperintense signal are seen in the cerebral white matter (for  example, see series 4, images 17 and 21), not appearing significantly  changed. Although nonspecific, these can represent early sequela of  chronic small vessel ischemic change, and this extent would be  considered within normal limits for a patient of this age. Unchanged  borderline small size of the pituitary gland for age with pituitary  height of approximately 3.5 mm (series 7 image 13). Otherwise, the  morphology, volume, and signal intensity of the brain parenchyma are  within normal limits for age. The major arterial flow voids of the  skull base are grossly maintained. No evidence for intracranial  hemorrhage, extra-axial fluid collection, mass lesion, or abnormal  intracranial postcontrast enhancement.     The orbits appear within normal limits, accounting for limitations of  technique. The calvarium, skull base and mid face otherwise appear  grossly unremarkable.                                                                      IMPRESSION:  1. No acute intracranial process. No significant change compared to  brain MRI of 6/21/2019. Specifically, no intracranial hemorrhage, mass  lesion, infarct, or abnormal enhancement identified.  2. A few punctate nonspecific foci of T2 FLAIR hyperintense signal  noted in the cerebral white matter.  3. Borderline small size of the pituitary gland for the patient's age.  This can be a normal variant, but also can be related to pituitary  atrophy or  hypoplasia. Given the patient's history of whole brain  radiation, this could represent pituitary atrophy. Recommend  clinical/laboratory correlation for any evidence to suggest  hypothalamic/pituitary endocrine dysfunction.  4. Otherwise unremarkable brain MRI.        ASSESSMENT / PLAN:  (E23.7) Pituitary abnormality (H)      1) Pituitary Gland possible atrophy post brain XRT  2) Central Hypothyroidism  - Continue levothyroxine 75mcg overall improving, but recheck TFTs now and annually    3) Central hypogonadism  - Defer testosterone replacement given evaluation of other health concerns now, may consider in the upcoming months depending on where testosterone level is.  - If interested in fertility treatment, could consider HCG vs clomid, however would refer to [facility] for this (urology)        Orders Placed This Encounter   Procedures    T4 free    TSH    Testosterone total    Vitamin D Deficiency    Cortisol        Return to clinic 12 months    A total of 20 minutes were spent today 04/01/25 on this visit including chart review, history and counseling, documentation and other activities as detailed above.

## 2025-04-18 ENCOUNTER — THERAPY VISIT (OUTPATIENT)
Dept: PHYSICAL THERAPY | Facility: CLINIC | Age: 45
End: 2025-04-18
Payer: COMMERCIAL

## 2025-04-18 DIAGNOSIS — G43.809 VESTIBULAR MIGRAINE: Primary | ICD-10-CM

## 2025-04-18 PROCEDURE — 97112 NEUROMUSCULAR REEDUCATION: CPT | Mod: GP | Performed by: PHYSICAL THERAPIST

## 2025-05-09 ENCOUNTER — THERAPY VISIT (OUTPATIENT)
Dept: PHYSICAL THERAPY | Facility: CLINIC | Age: 45
End: 2025-05-09
Payer: COMMERCIAL

## 2025-05-09 DIAGNOSIS — G43.809 VESTIBULAR MIGRAINE: Primary | ICD-10-CM

## 2025-05-09 PROCEDURE — 97112 NEUROMUSCULAR REEDUCATION: CPT | Mod: GP | Performed by: PHYSICAL THERAPIST

## 2025-05-13 DIAGNOSIS — E03.8 CENTRAL HYPOTHYROIDISM: ICD-10-CM

## 2025-05-13 RX ORDER — LEVOTHYROXINE SODIUM 75 UG/1
75 TABLET ORAL DAILY
Qty: 90 TABLET | Refills: 0 | Status: SHIPPED | OUTPATIENT
Start: 2025-05-13

## 2025-05-13 NOTE — TELEPHONE ENCOUNTER
Requested Prescriptions   Pending Prescriptions Disp Refills    levothyroxine (SYNTHROID/LEVOTHROID) 75 MCG tablet [Pharmacy Med Name: LEVOTHYROXINE 75 MCG TABLET] 90 tablet 0     Sig: TAKE 1 TABLET BY MOUTH EVERY DAY       Thyroid Protocol Failed - 5/13/2025  9:08 AM        Failed - Normal TSH on file in past 12 months     Recent Labs   Lab Test 01/19/23  0802   TSH 0.07*              Passed - Patient is 12 years or older        Passed - Medication is active on med list and the sig matches. RN to manually verify dose and sig if red X/fail.     If the protocol passes (green check), you do not need to verify med dose and sig.    A prescription matches if they are the same clinical intention.    For Example: once daily and every morning are the same.    The protocol can not identify upper and lower case letters as matching and will fail.     For Example: Take 1 tablet (50 mg) by mouth daily     TAKE 1 TABLET (50 MG) BY MOUTH DAILY    For all fails (red x), verify dose and sig.    If the refill does match what is on file, the RN can still proceed to approve the refill request.       If they do not match, route to the appropriate provider.             Passed - Recent (12 mo) or future (90 days) visit within the authorizing provider's specialty     The patient must have completed an in-person or virtual visit within the past 12 months or has a future visit scheduled within the next 90 days with the authorizing provider s specialty.  Urgent care and e-visits do not qualify as an office visit for this protocol.          Passed - Medication indicated for associated diagnosis     Medication is associated with one or more of the following diagnoses:  Hypothyroidism  Thyroid stimulating hormone suppression therapy  Thyroid cancer  Acquired atrophy of thyroid             Last Written Prescription Date:  2/11/25  Last Fill Quantity: 90 tab,  # refills: 0   Last office visit: Visit date not found ; last virtual visit: 4/1/2025  with prescribing provider:   Dr Vega   Future Office Visit:  RTC 4/2026    Patient due for labs. Refill sent and MCM sent to have patient schedule lab appt.    Beny Mazariegos RN

## 2025-05-20 ENCOUNTER — THERAPY VISIT (OUTPATIENT)
Dept: PHYSICAL THERAPY | Facility: CLINIC | Age: 45
End: 2025-05-20
Payer: COMMERCIAL

## 2025-05-20 DIAGNOSIS — G43.809 VESTIBULAR MIGRAINE: Primary | ICD-10-CM

## 2025-05-20 PROCEDURE — 97112 NEUROMUSCULAR REEDUCATION: CPT | Mod: GP | Performed by: PHYSICAL THERAPIST

## 2025-05-20 PROCEDURE — 97535 SELF CARE MNGMENT TRAINING: CPT | Mod: GP | Performed by: PHYSICAL THERAPIST

## 2025-05-20 NOTE — PATIENT INSTRUCTIONS
Physical therapy 5/20/25:  The goal this week is to work in more  preventative  rest breaks. Take a break for 5 minutes every hour to sit and breathe, walk a lap, go to the bathroom, take a break from any kind of aggravating activity. You should take still take a break even if you're feeling good.     Triggers:   Stress - especially surrounding neck disc herniation  Overexertion - goal of taking more preventative rest breaks  Red wine and caffeine      If in the midst of a migraine episode or vertigo episode, don't do an Epley maneuver or your vestibular exercises. Your brain is cranky and we need to calm it down - instead focus on calming your system down with breathing exercises, guided meditation (pull up a video on youtube or [employer] timer is a great porfirio), dim the lights, reduce your irritating environment factors. When feeling better you can go back to doing the exercises.     Exercise:   1. Standing with eyes closed on a folded pillow/blanket or towel for 1 minute. Stop if you feel an increase of 2/10 points in dizziness or more.  2. Walk looking diagonally up and left for 3 steps, then forward for 3 steps, then down and right for 3 steps. Repeat 3-4 passes then take a standing breathing break to help symptoms return to baseline.  Repeat on the opposite diagonal.   3. Walk around objects placed about 10 feet apart in a figure 8 pattern, repeat 5 laps. Standing and take a breathing break to help symptoms return to baseline.  Try to increase the number of reps up to a total of 10 reps.   4. Sitting and facing a plain/blank background. Hold your thumb up in front of you  and move your head and hand together while keeping your eyes on your thumb. Repeat 5 times side to side with slow movement.     Take a seated breathing break for 1-2 minutes after each exercise to let symptoms return to baseline.

## 2025-06-05 ENCOUNTER — TELEPHONE (OUTPATIENT)
Dept: NEUROLOGY | Facility: CLINIC | Age: 45
End: 2025-06-05
Payer: COMMERCIAL

## 2025-06-05 ENCOUNTER — TELEPHONE (OUTPATIENT)
Dept: CARDIOLOGY | Facility: CLINIC | Age: 45
End: 2025-06-05
Payer: COMMERCIAL

## 2025-06-05 DIAGNOSIS — E78.5 HYPERLIPIDEMIA: Primary | ICD-10-CM

## 2025-06-05 DIAGNOSIS — G43.009 MIGRAINE WITHOUT AURA AND WITHOUT STATUS MIGRAINOSUS, NOT INTRACTABLE: ICD-10-CM

## 2025-06-05 DIAGNOSIS — G43.809 VESTIBULAR MIGRAINE: Primary | ICD-10-CM

## 2025-06-05 RX ORDER — NORTRIPTYLINE HYDROCHLORIDE 10 MG/1
40 CAPSULE ORAL AT BEDTIME
Qty: 120 CAPSULE | Refills: 3 | Status: SHIPPED | OUTPATIENT
Start: 2025-06-05

## 2025-06-05 RX ORDER — ALMOTRIPTAN 12.5 MG/1
12.5 TABLET, FILM COATED ORAL DAILY PRN
Qty: 12 TABLET | Refills: 3 | Status: SHIPPED | OUTPATIENT
Start: 2025-06-05

## 2025-06-05 NOTE — TELEPHONE ENCOUNTER
Orders for FLP/ALT are entered in Epic. For David Trevino RN, [phone]               [facility]    Phone Message    May a detailed message be left on voicemail: yes     Reason for Call: Order(s): Other: Fasting lipid & ALT  Reason for requested:  He had to cancel his lab appt of 5/20/25 due to vertigo & that seems to have cancelled the lab orders.  He is requesting new lab orders for fasting lipid & ALT - he is scheduled for labs on 6/19 in [place].   Date needed: 6/18/2025  Provider name: David Morejon     Action Taken: Other: Cardiology     Travel Screening: Not Applicable     Thank you!  [facility]

## 2025-06-05 NOTE — TELEPHONE ENCOUNTER
"Spoke with patient.     He is still taking amitriptyline 40 mg nightly.  Does feel like headaches are better with it, however he is feeling \"very groggy\" and is wondering if this can be reduced or switched.     He is still getting frequent migraines.  Last couple weeks his migraine will last a few days.     Uses Nurtec PRN, and it works about half the time.      Using rizatriptan, but whole tablet makes him \"foggy\", so he has been using half tablet, if he is needing to drive or work.  He does feel this is more beneficial than the Nurtec, as long as he takes it within 15 minutes.     He does get relief same day from PRN meds, but headache will return within a couple days.     He has been missing more work, and employer's are getting frustrated    Still in Vestibular PT.      Tried sumatriptan in past, it did help but it \"clouded\" him up more than the rizatriptan.     Recently started medication for hypothyroidism, but still having fatigue.     Please advise if alternative recommended to amitriptyline and rizatriptan due to side effects.     Maricruz RN, BSN  [facility]      "

## 2025-06-05 NOTE — TELEPHONE ENCOUNTER
6/5/25 11:59 AM  For an alternative to amitriptyline, I recommend he try nortriptyline at the same dose. Similar to amitriptyline but often better tolerated, so hopefully will cause less grogginess.      For an alternative to rizatriptan, I recommend a trial of almotriptan. Let me know if this still causes drowsiness.      Sent both prescriptions to [facility].      GEORGE ZavalaM for pt letting him know that new medications were sent in.  Mychart sent with provider message.     Maricruz RN, BSN  [facility]

## 2025-06-05 NOTE — TELEPHONE ENCOUNTER
[facility] Call Center    Phone Message    May a detailed message be left on voicemail: yes     Reason for Call: Patient has questions concerning medications and would like to review with care team.  Patient is experiencing continued vertigo and migraines  Intermittent missing work      Action Taken: [facility] Neurology    Travel Screening: Not Applicable     Date of Service:

## 2025-06-19 ENCOUNTER — LAB (OUTPATIENT)
Dept: LAB | Facility: CLINIC | Age: 45
End: 2025-06-19
Payer: COMMERCIAL

## 2025-06-19 DIAGNOSIS — E23.7 PITUITARY ABNORMALITY: ICD-10-CM

## 2025-06-19 DIAGNOSIS — E03.8 CENTRAL HYPOTHYROIDISM: ICD-10-CM

## 2025-06-19 LAB
ALBUMIN SERPL BCG-MCNC: 4.9 G/DL (ref 3.5–5.2)
ALP SERPL-CCNC: 68 U/L (ref 40–150)
ALT SERPL W P-5'-P-CCNC: 29 U/L (ref 0–70)
ANION GAP SERPL CALCULATED.3IONS-SCNC: 10 MMOL/L (ref 7–15)
AST SERPL W P-5'-P-CCNC: 28 U/L (ref 0–45)
BILIRUB SERPL-MCNC: 0.3 MG/DL
BUN SERPL-MCNC: 12.6 MG/DL (ref 6–20)
CALCIUM SERPL-MCNC: 9.7 MG/DL (ref 8.8–10.4)
CHLORIDE SERPL-SCNC: 99 MMOL/L (ref 98–107)
CORTIS SERPL-MCNC: 15.9 UG/DL
CREAT SERPL-MCNC: 1.06 MG/DL (ref 0.67–1.17)
EGFRCR SERPLBLD CKD-EPI 2021: 88 ML/MIN/1.73M2
EST. AVERAGE GLUCOSE BLD GHB EST-MCNC: 108 MG/DL
GLUCOSE SERPL-MCNC: 95 MG/DL (ref 70–99)
HBA1C MFR BLD: 5.4 % (ref 0–5.6)
HCO3 SERPL-SCNC: 28 MMOL/L (ref 22–29)
POTASSIUM SERPL-SCNC: 4.8 MMOL/L (ref 3.4–5.3)
PROT SERPL-MCNC: 7.8 G/DL (ref 6.4–8.3)
SODIUM SERPL-SCNC: 137 MMOL/L (ref 135–145)
T4 FREE SERPL-MCNC: 1.02 NG/DL (ref 0.9–1.7)
TSH SERPL DL<=0.005 MIU/L-ACNC: 0.65 UIU/ML (ref 0.3–4.2)
VIT D+METAB SERPL-MCNC: 30 NG/ML (ref 20–50)

## 2025-06-21 LAB — TESTOST SERPL-MCNC: 270 NG/DL (ref 240–950)

## 2025-06-26 ENCOUNTER — LAB (OUTPATIENT)
Dept: LAB | Facility: CLINIC | Age: 45
End: 2025-06-26
Payer: COMMERCIAL

## 2025-06-26 ENCOUNTER — RESULTS FOLLOW-UP (OUTPATIENT)
Dept: ENDOCRINOLOGY | Facility: CLINIC | Age: 45
End: 2025-06-26

## 2025-06-26 DIAGNOSIS — E78.5 HYPERLIPIDEMIA: ICD-10-CM

## 2025-06-26 LAB
ALT SERPL W P-5'-P-CCNC: 25 U/L (ref 0–70)
CHOLEST SERPL-MCNC: 152 MG/DL
FASTING STATUS PATIENT QL REPORTED: ABNORMAL
HDLC SERPL-MCNC: 42 MG/DL
LDLC SERPL CALC-MCNC: 74 MG/DL
NONHDLC SERPL-MCNC: 110 MG/DL
TRIGL SERPL-MCNC: 179 MG/DL

## 2025-07-14 ASSESSMENT — MIGRAINE DISABILITY ASSESSMENT (MIDAS)
HOW MANY DAYS IN THE PAST 3 MONTHS HAVE YOU HAD A HEADACHE: 39
ON A SCALE FROM 0-10 ON AVERAGE HOW PAINFUL WERE HEADACHES: 7
HOW MANY DAYS WAS HOUSEWORK PRODUCTIVITY CUT IN HALF DUE TO HEADACHES: 24
TOTAL SCORE: 52
HOW MANY DAYS DID YOU MISS WORK OR SCHOOL BECAUSE OF HEADACHES: 6
HOW MANY DAYS WAS YOUR PRODUCTIVITY CUT IN HALF BECAUSE OF HEADACHES: 4
HOW MANY DAYS DID YOU NOT DO HOUSEWORK BECAUSE OF HEADACHES: 15
HOW OFTEN WERE SOCIAL ACTIVITIES MISSED DUE TO HEADACHES: 3

## 2025-07-14 ASSESSMENT — HEADACHE IMPACT TEST (HIT 6)
HOW OFTEN DO HEADACHES LIMIT YOUR DAILY ACTIVITIES: SOMETIMES
WHEN YOU HAVE A HEADACHE HOW OFTEN DO YOU WISH YOU COULD LIE DOWN: VERY OFTEN
HOW OFTEN HAVE YOU FELT FED UP OR IRRITATED BECAUSE OF YOUR HEADACHES: ALWAYS
WHEN YOU HAVE HEADACHES HOW OFTEN IS THE PAIN SEVERE: SOMETIMES
HIT6 TOTAL SCORE: 68
HOW OFTEN DID HEADACHS LIMIT CONCENTRATION ON WORK OR DAILY ACTIVITY: VERY OFTEN
HOW OFTEN HAVE YOU FELT TOO TIRED TO WORK BECAUSE OF YOUR HEADACHES: ALWAYS

## 2025-07-15 ENCOUNTER — VIRTUAL VISIT (OUTPATIENT)
Dept: NEUROLOGY | Facility: CLINIC | Age: 45
End: 2025-07-15
Payer: COMMERCIAL

## 2025-07-15 DIAGNOSIS — G43.809 VESTIBULAR MIGRAINE: ICD-10-CM

## 2025-07-15 DIAGNOSIS — G43.009 MIGRAINE WITHOUT AURA AND WITHOUT STATUS MIGRAINOSUS, NOT INTRACTABLE: ICD-10-CM

## 2025-07-15 DIAGNOSIS — H81.10 BPPV (BENIGN PAROXYSMAL POSITIONAL VERTIGO), UNSPECIFIED LATERALITY: ICD-10-CM

## 2025-07-15 PROCEDURE — 98007 SYNCH AUDIO-VIDEO EST HI 40: CPT

## 2025-07-15 PROCEDURE — G2211 COMPLEX E/M VISIT ADD ON: HCPCS | Mod: 95

## 2025-07-15 PROCEDURE — 1125F AMNT PAIN NOTED PAIN PRSNT: CPT | Mod: 95

## 2025-07-15 RX ORDER — AMITRIPTYLINE HYDROCHLORIDE 10 MG/1
40 TABLET ORAL AT BEDTIME
Qty: 360 TABLET | Refills: 3 | Status: SHIPPED | OUTPATIENT
Start: 2025-07-15

## 2025-07-15 RX ORDER — MECLIZINE HYDROCHLORIDE 25 MG/1
25 TABLET ORAL EVERY 6 HOURS PRN
Qty: 90 TABLET | Refills: 5 | Status: SHIPPED | OUTPATIENT
Start: 2025-07-15

## 2025-07-15 RX ORDER — PROMETHAZINE HYDROCHLORIDE 25 MG/1
25 TABLET ORAL EVERY 6 HOURS PRN
Qty: 20 TABLET | Refills: 3 | Status: SHIPPED | OUTPATIENT
Start: 2025-07-15

## 2025-07-15 NOTE — PATIENT INSTRUCTIONS
[email or web address]   [email or web address]    Emgality would be another alternative     MyChart if you want to start one of these options.

## 2025-07-15 NOTE — NURSING NOTE
Current patient location: [address] [place] [id number]    Is the patient currently in the state of [place]? YES    Visit mode: VIDEO    If the visit is dropped, the patient can be reconnected by:TELEPHONE VISIT: Phone number:   Telephone Information:   Mobile [phone]       Will anyone else be joining the visit? NO  (If patient encounters technical issues they should call [phone])    Are changes needed to the allergy or medication list? Pt stated no med changes    Are refills needed on medications prescribed by this physician? NO    Rooming Documentation:  Questionnaire(s) completed    Reason for visit: Headache    Divine ARMANDOF

## 2025-07-15 NOTE — PROGRESS NOTES
Virtual Visit Details    Type of service:  Video Visit     Originating Location (pt. Location): Home    Distant Location (provider location):  On-site  Platform used for Video Visit: [facility]    Headache Neurology Progress Note    July 15, 2025    Assessment and Plan:   Alfredo is a 45 year old male who presents for follow-up of vestibular migraine.    We discussed the following treatment strategy:  - For acute treatment of migraine, he may use almotriptan 12.5 mg at onset of migraine with a repeat dose after 2 hours if needed. Use should not exceed 9 days per month.  - Alternatively, can trial Ubrelvy  mg taken at onset of migraine with a repeat dose after 2 hours if needed. Max daily dose 200 mg/24 hours. Side effects reviewed.   - For acute treatment of vertigo, he may continue to use meclizine 25 mg as needed.  - Alternatively, can trial promethazine 25 mg as needed. Advised not to use this with meclizine.      His current symptom frequency and severity warrant prevention.  - Continue with amitriptyline 40 mg nightly.  - He currently takes carvedilol so we will avoid additional antihypertensive agents. Previously tried and failed topiramate.  - I recommend he continue with vestibular therapy.  - Could consider botulinum toxin injections or CGRP antagonists. Emgality previously was costly despite insurance approval. He will consider these options and can MyChart message if he wants to start one.     The longitudinal plan of care for the diagnosis(es)/condition(s) as documented were addressed during this visit. Due to the added complexity in care, I will continue to support Alfredo in the subsequent management and with ongoing continuity of care.     Follow up in 4 months.    I spent 42 minutes today on patient care, chart review, medical discussion and counseling, and documentation.    Veronica Motta PA-C  Headache Neurology  [facility] -  "[place]      Subjective:    Alfredo presents for follow up of vestibular migraine.     Headache description from initial consult:   \"He reports he has been experiencing vertigo since 2011, and migraines since 2007.  Regarding his vertigo, he describes a sensation where the room seems like its being jostled up and down, as if he were a bobblehead though his head is staying still. This occurs 3-4 times per week lasting about 6-24 hours with waxing and waning severity.  He can get more intense bouts of vertigo where everything is spinning and these bouts can last 3-4 days in duration.   He gets nausea, but not too frequently. This fall, he had a severe bout of vertigo with nausea leading to vomiting.   He also has a history of migraine headaches. Migraines will start in the frontal and bitemporal regions, though will focus behind his eye. They are a sharp pain lasting up to 2 days. He rarely has nausea with migraine. He thinks he gets a migraine about 30-50% of the time associated with his bobblehead-effect vertigo, though he can occasionally get migraines without dizziness.   He can have photophobia, phonophobia with bouts of vertigo, as well as with his migraines.\"    Alfredo currently reports 12-16/30 headache days per month, with 4-8/30 severe headache days per month.     Has been finding good relief with almotriptan. Finds this effective while causing minimal side effects.   Has not switched to nortriptyline, recalls side effects from this in the past. Wants to stick with amitriptyline 40 mg for now.  Needs to take meclizine for dizziness/imbalance 3 times per week. Did not find 37.5 mg more effective than 25 mg.   Has not needed to miss work or work remote unscheduled in the past 2+ weeks. This is a positive for him.     Continues with vestibular PT.     Cut out caffeine as this is a trigger for symptoms.     Trying lipo flavanoid balance support.     Current headache treatments:  Acute therapies:  - Almotriptan 12.5 mg " - effective, minimal side effects  - Nurtec 75 mg ODT - effective 50% of the time   - Meclizine - helpful for vertigo     Preventative therapies:  - Amitriptyline 40 mg  - Carvedilol    Supportive therapies:  - Vestibular PT      Previous treatments tried:  Acute therapies:  - Sumatriptan 100 mg - too sedating   - Rizatriptan 10 mg - sedating     Preventative therapies:  - Topiramate 25 mg - Tried for 1-2 weeks but had significant cognitive effects, unsteady, unable to drive   - Emgality 120 mg - too costly, did not start  - Nortriptyline - tried in 2008, recalls morning grogginess, constipated          1/9/2025     9:21 PM 2/10/2025    10:03 PM 7/14/2025    11:05 AM   HIT-6   When you have headaches, how often is the pain severe 11 11 10   How often do headaches limit your ability to do usual daily activities including household work, work, school, or social activities? 11 11 10   When you have a headache, how often do you wish you could lie down? 11 11 11   In the past 4 weeks, how often have you felt too tired to do work or daily activities because of your headaches 11 10 13   In the past 4 weeks, how often have you felt fed up or irritated because of your headaches 13 10 13   In the past 4 weeks, how often did headaches limit your ability to concentrate on work or daily activities 11 10 11   HIT-6 Total Score 68  63  68        Patient-reported           1/13/2025     6:20 PM 2/10/2025    10:04 PM 7/14/2025    11:36 PM   MIDAS - in the past three months:   On how many days did you miss work or school because of your headaches? 6 6 6   How many days was your productivity at work or school reduced by half or more because of your headaches? 7 7 4   On how many days did you not do household work because of your headaches? 21 21 15   How many days was your productivity in household work reduced by half or more because of your headaches? 24 24 24   On how many days did you miss family, social, or leisure activities  because of your headaches? 7 7 3   On how many days did you have a headache? 48 48 39   On a scale of 0-10, on average how painful were these headaches? 9 9 7   MIDAS Score 65 (IV - Severe Disability) 65 (IV - Severe Disability) 52 (IV - Severe Disability)     Objective:    Vitals: There were no vitals taken for this visit.  General: Cooperative, NAD  Neurologic Exam:  Mental Status: Fully alert, attentive and oriented. Speech is clear and fluent.   Cranial Nerves: Facial movements symmetric.   Motor: No abnormal movements.

## 2025-07-15 NOTE — LETTER
7/15/2025      Alfredo Brewer  [address] [facility] [address]      Dear Colleague,    Thank you for referring your patient, Alfredo Brewer, to the [facility]. Please see a copy of my visit note below.    Virtual Visit Details    Type of service:  Video Visit     Originating Location (pt. Location): Home    Distant Location (provider location):  On-site  Platform used for Video Visit: [facility]    Headache Neurology Progress Note    July 15, 2025    Assessment and Plan:   Alfredo is a 45 year old male who presents for follow-up of vestibular migraine.    We discussed the following treatment strategy:  - For acute treatment of migraine, he may use almotriptan 12.5 mg at onset of migraine with a repeat dose after 2 hours if needed. Use should not exceed 9 days per month.  - Alternatively, can trial Ubrelvy  mg taken at onset of migraine with a repeat dose after 2 hours if needed. Max daily dose 200 mg/24 hours. Side effects reviewed.   - For acute treatment of vertigo, he may continue to use meclizine 25 mg as needed.  - Alternatively, can trial promethazine 25 mg as needed. Advised not to use this with meclizine.      His current symptom frequency and severity warrant prevention.  - Continue with amitriptyline 40 mg nightly.  - He currently takes carvedilol so we will avoid additional antihypertensive agents. Previously tried and failed topiramate.  - I recommend he continue with vestibular therapy.  - Could consider botulinum toxin injections or CGRP antagonists. Emgality previously was costly despite insurance approval. He will consider these options and can MyChart message if he wants to start one.     The longitudinal plan of care for the diagnosis(es)/condition(s) as documented were addressed during this visit. Due to the added complexity in care, I will continue to support Alfredo in the subsequent management and with ongoing  "continuity of care.     Follow up in 4 months.    I spent 42 minutes today on patient care, chart review, medical discussion and counseling, and documentation.    Veronica Motta PA-C  Headache Neurology  [facility]      Subjective:    Alfredo presents for follow up of vestibular migraine.     Headache description from initial consult:   \"He reports he has been experiencing vertigo since 2011, and migraines since 2007.  Regarding his vertigo, he describes a sensation where the room seems like its being jostled up and down, as if he were a bobblehead though his head is staying still. This occurs 3-4 times per week lasting about 6-24 hours with waxing and waning severity.  He can get more intense bouts of vertigo where everything is spinning and these bouts can last 3-4 days in duration.   He gets nausea, but not too frequently. This fall, he had a severe bout of vertigo with nausea leading to vomiting.   He also has a history of migraine headaches. Migraines will start in the frontal and bitemporal regions, though will focus behind his eye. They are a sharp pain lasting up to 2 days. He rarely has nausea with migraine. He thinks he gets a migraine about 30-50% of the time associated with his bobblehead-effect vertigo, though he can occasionally get migraines without dizziness.   He can have photophobia, phonophobia with bouts of vertigo, as well as with his migraines.\"    Alfredo currently reports 12-16/30 headache days per month, with 4-8/30 severe headache days per month.     Has been finding good relief with almotriptan. Finds this effective while causing minimal side effects.   Has not switched to nortriptyline, recalls side effects from this in the past. Wants to stick with amitriptyline 40 mg for now.  Needs to take meclizine for dizziness/imbalance 3 times per week. Did not find 37.5 mg more effective than 25 mg.   Has not needed to miss work or work remote unscheduled in the past 2+ weeks. " This is a positive for him.     Continues with vestibular PT.     Cut out caffeine as this is a trigger for symptoms.     Trying lipo flavanoid balance support.     Current headache treatments:  Acute therapies:  - Almotriptan 12.5 mg - effective, minimal side effects  - Nurtec 75 mg ODT - effective 50% of the time   - Meclizine - helpful for vertigo     Preventative therapies:  - Amitriptyline 40 mg  - Carvedilol    Supportive therapies:  - Vestibular PT      Previous treatments tried:  Acute therapies:  - Sumatriptan 100 mg - too sedating   - Rizatriptan 10 mg - sedating     Preventative therapies:  - Topiramate 25 mg - Tried for 1-2 weeks but had significant cognitive effects, unsteady, unable to drive   - Emgality 120 mg - too costly, did not start  - Nortriptyline - tried in 2008, recalls morning grogginess, constipated          1/9/2025     9:21 PM 2/10/2025    10:03 PM 7/14/2025    11:05 AM   HIT-6   When you have headaches, how often is the pain severe 11 11 10   How often do headaches limit your ability to do usual daily activities including household work, work, school, or social activities? 11 11 10   When you have a headache, how often do you wish you could lie down? 11 11 11   In the past 4 weeks, how often have you felt too tired to do work or daily activities because of your headaches 11 10 13   In the past 4 weeks, how often have you felt fed up or irritated because of your headaches 13 10 13   In the past 4 weeks, how often did headaches limit your ability to concentrate on work or daily activities 11 10 11   HIT-6 Total Score 68  63  68        Patient-reported           1/13/2025     6:20 PM 2/10/2025    10:04 PM 7/14/2025    11:36 PM   MIDAS - in the past three months:   On how many days did you miss work or school because of your headaches? 6 6 6   How many days was your productivity at work or school reduced by half or more because of your headaches? 7 7 4   On how many days did you not do  household work because of your headaches? 21 21 15   How many days was your productivity in household work reduced by half or more because of your headaches? 24 24 24   On how many days did you miss family, social, or leisure activities because of your headaches? 7 7 3   On how many days did you have a headache? 48 48 39   On a scale of 0-10, on average how painful were these headaches? 9 9 7   MIDAS Score 65 (IV - Severe Disability) 65 (IV - Severe Disability) 52 (IV - Severe Disability)     Objective:    Vitals: There were no vitals taken for this visit.  General: Cooperative, NAD  Neurologic Exam:  Mental Status: Fully alert, attentive and oriented. Speech is clear and fluent.   Cranial Nerves: Facial movements symmetric.   Motor: No abnormal movements.          Again, thank you for allowing me to participate in the care of your patient.        Sincerely,        Veronica Motta PA-C    Electronically signed

## 2025-08-11 DIAGNOSIS — E03.8 CENTRAL HYPOTHYROIDISM: ICD-10-CM

## 2025-08-11 RX ORDER — LEVOTHYROXINE SODIUM 75 UG/1
75 TABLET ORAL DAILY
Qty: 90 TABLET | Refills: 2 | Status: SHIPPED | OUTPATIENT
Start: 2025-08-11